# Patient Record
Sex: MALE | Employment: OTHER | URBAN - METROPOLITAN AREA
[De-identification: names, ages, dates, MRNs, and addresses within clinical notes are randomized per-mention and may not be internally consistent; named-entity substitution may affect disease eponyms.]

---

## 2019-01-02 PROBLEM — R06.09 DYSPNEA ON EXERTION: Status: ACTIVE | Noted: 2019-01-02

## 2019-01-02 PROBLEM — R55 SYNCOPE: Status: ACTIVE | Noted: 2019-01-02

## 2019-01-02 PROBLEM — I25.10 CORONARY ARTERY DISEASE: Status: ACTIVE | Noted: 2019-01-02

## 2020-05-29 ENCOUNTER — VIRTUAL VISIT (OUTPATIENT)
Dept: FAMILY MEDICINE CLINIC | Age: 70
End: 2020-05-29

## 2020-05-29 VITALS — HEIGHT: 72 IN | WEIGHT: 260 LBS | BODY MASS INDEX: 35.21 KG/M2

## 2020-05-29 DIAGNOSIS — R55 SYNCOPE, UNSPECIFIED SYNCOPE TYPE: Primary | ICD-10-CM

## 2020-05-29 DIAGNOSIS — E55.9 VITAMIN D DEFICIENCY: ICD-10-CM

## 2020-05-29 DIAGNOSIS — Z79.4 TYPE 2 DIABETES MELLITUS TREATED WITH INSULIN (HCC): ICD-10-CM

## 2020-05-29 DIAGNOSIS — I25.10 CORONARY ARTERY DISEASE WITHOUT ANGINA PECTORIS, UNSPECIFIED VESSEL OR LESION TYPE, UNSPECIFIED WHETHER NATIVE OR TRANSPLANTED HEART: ICD-10-CM

## 2020-05-29 DIAGNOSIS — K21.9 GASTROESOPHAGEAL REFLUX DISEASE WITHOUT ESOPHAGITIS: ICD-10-CM

## 2020-05-29 DIAGNOSIS — E11.9 TYPE 2 DIABETES MELLITUS TREATED WITH INSULIN (HCC): ICD-10-CM

## 2020-05-29 DIAGNOSIS — I10 ESSENTIAL HYPERTENSION: ICD-10-CM

## 2020-05-29 DIAGNOSIS — E10.65 HYPERGLYCEMIA DUE TO TYPE 1 DIABETES MELLITUS (HCC): ICD-10-CM

## 2020-05-29 RX ORDER — FUROSEMIDE 20 MG/1
20 TABLET ORAL
Qty: 90 TAB | Refills: 1 | Status: CANCELLED | OUTPATIENT
Start: 2020-05-29

## 2020-05-29 RX ORDER — ERGOCALCIFEROL 1.25 MG/1
CAPSULE ORAL
COMMUNITY
Start: 2020-05-13 | End: 2022-02-15

## 2020-05-30 RX ORDER — ALBUTEROL SULFATE 90 UG/1
2 AEROSOL, METERED RESPIRATORY (INHALATION)
Qty: 1 INHALER | Refills: 5 | Status: SHIPPED | OUTPATIENT
Start: 2020-05-30 | End: 2020-11-16 | Stop reason: SDUPTHER

## 2020-05-30 RX ORDER — FUROSEMIDE 40 MG/1
40 TABLET ORAL DAILY
Qty: 90 TAB | Refills: 1 | Status: SHIPPED | OUTPATIENT
Start: 2020-05-30 | End: 2020-11-13

## 2020-05-30 RX ORDER — OMEPRAZOLE 40 MG/1
40 CAPSULE, DELAYED RELEASE ORAL DAILY
Qty: 90 CAP | Refills: 1 | Status: SHIPPED | OUTPATIENT
Start: 2020-05-30 | End: 2020-11-13

## 2020-05-30 RX ORDER — CARVEDILOL 25 MG/1
25 TABLET ORAL 2 TIMES DAILY WITH MEALS
Qty: 180 TAB | Refills: 1 | Status: SHIPPED | OUTPATIENT
Start: 2020-05-30 | End: 2020-11-13

## 2020-05-30 RX ORDER — HYDROCHLOROTHIAZIDE 25 MG/1
25 TABLET ORAL DAILY
Qty: 90 TAB | Refills: 1 | Status: SHIPPED | OUTPATIENT
Start: 2020-05-30 | End: 2020-11-13

## 2020-05-30 RX ORDER — HYDRALAZINE HYDROCHLORIDE AND ISOSORBIDE DINITRATE 37.5; 2 MG/1; MG/1
1 TABLET, FILM COATED ORAL 3 TIMES DAILY
Qty: 270 TAB | Refills: 1 | Status: SHIPPED | OUTPATIENT
Start: 2020-05-30 | End: 2020-08-25 | Stop reason: SDUPTHER

## 2020-05-30 RX ORDER — GUAIFENESIN 100 MG/5ML
81 LIQUID (ML) ORAL DAILY
Qty: 90 TAB | Refills: 1 | Status: SHIPPED | OUTPATIENT
Start: 2020-05-30 | End: 2020-11-13

## 2020-05-30 RX ORDER — INSULIN GLARGINE 100 [IU]/ML
26 INJECTION, SOLUTION SUBCUTANEOUS
Qty: 3 PEN | Refills: 1 | Status: SHIPPED | OUTPATIENT
Start: 2020-05-30 | End: 2020-06-02 | Stop reason: SDUPTHER

## 2020-05-30 RX ORDER — ATORVASTATIN CALCIUM 40 MG/1
40 TABLET, FILM COATED ORAL DAILY
Qty: 90 TAB | Refills: 1 | Status: SHIPPED | OUTPATIENT
Start: 2020-05-30 | End: 2020-11-13

## 2020-05-30 RX ORDER — INSULIN GLARGINE 100 [IU]/ML
30 INJECTION, SOLUTION SUBCUTANEOUS 2 TIMES DAILY
Qty: 6 PEN | Refills: 1 | Status: SHIPPED | OUTPATIENT
Start: 2020-05-30 | End: 2020-06-02 | Stop reason: SDUPTHER

## 2020-05-30 RX ORDER — AMITRIPTYLINE HYDROCHLORIDE 25 MG/1
25 TABLET, FILM COATED ORAL
Qty: 90 TAB | Refills: 1 | Status: SHIPPED | OUTPATIENT
Start: 2020-05-30 | End: 2020-11-13

## 2020-05-30 RX ORDER — TAMSULOSIN HYDROCHLORIDE 0.4 MG/1
0.4 CAPSULE ORAL DAILY
Qty: 90 CAP | Refills: 1 | Status: SHIPPED | OUTPATIENT
Start: 2020-05-30 | End: 2020-11-13

## 2020-05-30 RX ORDER — METFORMIN HYDROCHLORIDE 1000 MG/1
1000 TABLET ORAL 2 TIMES DAILY WITH MEALS
Qty: 180 TAB | Refills: 1 | Status: SHIPPED | OUTPATIENT
Start: 2020-05-30 | End: 2020-11-13

## 2020-05-31 NOTE — PROGRESS NOTES
Clark Rondon is a 77 y.o. male who was seen by synchronous (real-time) audio-video technology on 5/29/2020. Consent: Clark Rondon, who was seen by synchronous (real-time) audio-video technology, and/or his healthcare decision maker, is aware that this patient-initiated, Telehealth encounter on 5/29/2020 is a billable service, with coverage as determined by his insurance carrier. He is aware that he may receive a bill and has provided verbal consent to proceed: Yes. [facility]   he was released march   he had a syncopal episode last week   he was taken to [facility] and they said his sugar was normal   he was told they did not find anything  He denies any chest pain or chest  He describes an \"uncomfortable feeling in my head\" right before he passes out   this has happened many other times even as a child    He is also had CAD and post CABG X4  He has no cardiologist at this time    DM   taking lantus insulin twice a day   he seems to understand that when his blood sugar is low a few times he needs to lower the insulin. CHF  He has h/o chf. Has no doc to follow up with   No chest pain and no sob    Asthma  No recent wheezing  But  needs albuterol for emergency use  GERD   needs refill on meds                                  Assessment & Plan:   Diagnoses and all orders for this visit:    1. Syncope, unspecified syncope type  -     REFERRAL TO NEUROLOGY    2. Coronary artery disease without angina pectoris, unspecified vessel or lesion type, unspecified whether native or transplanted heart  -     atorvastatin (LIPITOR) 40 mg tablet; Take 1 Tab by mouth daily. -     aspirin 81 mg chewable tablet; Take 1 Tab by mouth daily. -     carvediloL (COREG) 25 mg tablet; Take 1 Tab by mouth two (2) times daily (with meals). 3. Hyperglycemia due to type 1 diabetes mellitus ([address]    4.  Type 2 diabetes mellitus treated with insulin (HCC)  -     insulin glargine (Lantus Solostar U-100 Insulin) 100 unit/mL (3 mL) inpn; 30 Units by SubCUTAneous route two (2) times a day. -     insulin glargine (Lantus Solostar U-100 Insulin) 100 unit/mL (3 mL) inpn; 26 Units by SubCUTAneous route every morning. 5. Vitamin D deficiency  -     VITAMIN D, 25 HYDROXY; Future    Other orders  -     amitriptyline (ELAVIL) 25 mg tablet; Take 1 Tab by mouth nightly. -     isosorbide-hydrALAZINE (BiDiL) 20-37.5 mg per tablet; Take 1 Tab by mouth three (3) times daily. -     furosemide (LASIX) 40 mg tablet; Take 1 Tab by mouth daily. -     hydroCHLOROthiazide (HYDRODIURIL) 25 mg tablet; Take 1 Tab by mouth daily. -     tamsulosin (FLOMAX) 0.4 mg capsule; Take 1 Cap by mouth daily. -     furosemide (LASIX) 20 mg tablet; Take 1 Tab by mouth nightly. -     metFORMIN (GLUCOPHAGE) 1,000 mg tablet; Take 1 Tab by mouth two (2) times daily (with meals). -     omeprazole (PRILOSEC) 40 mg capsule; Take 1 Cap by mouth daily. -     albuterol (PROVENTIL HFA, VENTOLIN HFA, PROAIR HFA) 90 mcg/actuation inhaler; Take 2 Puffs by inhalation every four (4) hours as needed for Wheezing for up to 360 days.  -     METABOLIC PANEL, COMPREHENSIVE; Future  -     LIPID PANEL; Future  -     REFERRAL TO CARDIOLOGY        . Subjective:   Horacio Santos is a 77 y.o. male who was seen for New Patient (Patient states last week seen at [facility] ER for passing out. ); Establish Care (Patient states incarcerated 25 years. ); and Joint Pain      Prior to Admission medications    Medication Sig Start Date End Date Taking? Authorizing Provider   ergocalciferol (ERGOCALCIFEROL) 1,250 mcg (50,000 unit) capsule TK 1 C PO 1 TIME A WK 5/13/20  Yes Provider, Historical   amitriptyline (ELAVIL) 25 mg tablet Take 25 mg by mouth nightly. Yes Other, MD Abdoulaye   aspirin 81 mg chewable tablet Take 81 mg by mouth daily. Yes Other, MD Abdoulaye   atorvastatin (LIPITOR) 40 mg tablet Take 40 mg by mouth daily.    Yes Other, MD Abdoulaye   isosorbide-hydrALAZINE (BIDIL) 20-37.5 mg per tablet Take 1 Tab by mouth three (3) times daily. Yes Abdoulaye Baldwin MD   carvedilol (COREG) 25 mg tablet Take 25 mg by mouth two (2) times daily (with meals). Yes Abdoulaye Baldwin MD   furosemide (LASIX) 40 mg tablet Take 40 mg by mouth daily. Yes Abdoulaye Baldwin MD   hydroCHLOROthiazide (HYDRODIURIL) 25 mg tablet Take 25 mg by mouth daily. Yes Abdoulaye Baldwin MD   insulin NPH/insulin regular (HUMULIN 70/30 U-100 INSULIN) 100 unit/mL (70-30) injection 4 Units by SubCUTAneous route Before breakfast, lunch, and dinner. Yes Abdoulaye Baldwin MD   insulin glargine (LANTUS SOLOSTAR U-100 INSULIN) 100 unit/mL (3 mL) inpn 20 Units by SubCUTAneous route nightly. Yes Abdoulaye Baldwin MD   insulin glargine (LANTUS SOLOSTAR U-100 INSULIN) 100 unit/mL (3 mL) inpn 26 Units by SubCUTAneous route every morning. Yes Abdoulyae Baldwin MD   tamsulosin (FLOMAX) 0.4 mg capsule Take 0.4 mg by mouth daily. Yes Abdoulaye Baldwin MD   levalbuterol tartrate (XOPENEX HFA) 45 mcg/actuation inhaler Take 2 Puffs by inhalation every six (6) hours as needed for Wheezing. Yes Abdoulaye Baldwin MD   furosemide (LASIX) 20 mg tablet Take 20 mg by mouth nightly. Abdoulaye Baldwin MD   metFORMIN (GLUCOPHAGE) 1,000 mg tablet Take 1,000 mg by mouth two (2) times daily (with meals). Abdoulaye Baldwin MD   omeprazole (PRILOSEC) 40 mg capsule Take 40 mg by mouth daily. Abdoulaye Baldwin MD     No Known Allergies    Patient Active Problem List    Diagnosis Date Noted    Coronary artery disease 01/02/2019    Syncope 01/02/2019    Dyspnea on exertion 01/02/2019     Current Outpatient Medications   Medication Sig Dispense Refill    ergocalciferol (ERGOCALCIFEROL) 1,250 mcg (50,000 unit) capsule TK 1 C PO 1 TIME A WK      amitriptyline (ELAVIL) 25 mg tablet Take 25 mg by mouth nightly.  aspirin 81 mg chewable tablet Take 81 mg by mouth daily.  atorvastatin (LIPITOR) 40 mg tablet Take 40 mg by mouth daily.       isosorbide-hydrALAZINE (BIDIL) 20-37.5 mg per tablet Take 1 Tab by mouth three (3) times daily.  carvedilol (COREG) 25 mg tablet Take 25 mg by mouth two (2) times daily (with meals).  furosemide (LASIX) 40 mg tablet Take 40 mg by mouth daily.  hydroCHLOROthiazide (HYDRODIURIL) 25 mg tablet Take 25 mg by mouth daily.  insulin NPH/insulin regular (HUMULIN 70/30 U-100 INSULIN) 100 unit/mL (70-30) injection 4 Units by SubCUTAneous route Before breakfast, lunch, and dinner.  insulin glargine (LANTUS SOLOSTAR U-100 INSULIN) 100 unit/mL (3 mL) inpn 20 Units by SubCUTAneous route nightly.  insulin glargine (LANTUS SOLOSTAR U-100 INSULIN) 100 unit/mL (3 mL) inpn 26 Units by SubCUTAneous route every morning.  tamsulosin (FLOMAX) 0.4 mg capsule Take 0.4 mg by mouth daily.  levalbuterol tartrate (XOPENEX HFA) 45 mcg/actuation inhaler Take 2 Puffs by inhalation every six (6) hours as needed for Wheezing.  furosemide (LASIX) 20 mg tablet Take 20 mg by mouth nightly.  metFORMIN (GLUCOPHAGE) 1,000 mg tablet Take 1,000 mg by mouth two (2) times daily (with meals).  omeprazole (PRILOSEC) 40 mg capsule Take 40 mg by mouth daily.          ROS    Objective:   Vital Signs: (As obtained by patient/caregiver at home)  Visit Vitals  Ht 6' (1.829 m)   Wt 260 lb (117.9 kg)   BMI 35.26 kg/m²        [INSTRUCTIONS:  \"[x]\" Indicates a positive item  \"[]\" Indicates a negative item  -- DELETE ALL ITEMS NOT EXAMINED]    Constitutional: [x] Appears well-developed and well-nourished [x] No apparent distress      [] Abnormal -     Mental status: [x] Alert and awake  [x] Oriented to person/place/time [x] Able to follow commands    [] Abnormal -     Eyes:   EOM    [x]  Normal    [] Abnormal -   Sclera  [x]  Normal    [] Abnormal -          Discharge [x]  None visible   [] Abnormal -     HENT: [x] Normocephalic, atraumatic  [] Abnormal -   [x] Mouth/Throat: Mucous membranes are moist    External Ears [x] Normal  [] Abnormal -    Neck: [x] No visualized mass [] Abnormal -     Pulmonary/Chest: [x] Respiratory effort normal   [x] No visualized signs of difficulty breathing or respiratory distress        [] Abnormal -      Musculoskeletal:   [x] Normal gait with no signs of ataxia         [x] Normal range of motion of neck        [] Abnormal -     Neurological:        [x] No Facial Asymmetry (Cranial nerve 7 motor function) (limited exam due to video visit)          [x] No gaze palsy        [] Abnormal -          Skin:        [x] No significant exanthematous lesions or discoloration noted on facial skin         [] Abnormal -            Psychiatric:       [x] Normal Affect [] Abnormal -        [x] No Hallucinations    Other pertinent observable physical exam findings:-        We discussed the expected course, resolution and complications of the diagnosis(es) in detail. Medication risks, benefits, costs, interactions, and alternatives were discussed as indicated. I advised him to contact the office if his condition worsens, changes or fails to improve as anticipated. He expressed understanding with the diagnosis(es) and plan. Lilliana Lund is a 77 y.o. male who was evaluated by a video visit encounter for concerns as above. Patient identification was verified prior to start of the visit. A caregiver was present when appropriate. Due to this being a TeleHealth encounter (During CCOJP-00 public health emergency), evaluation of the following organ systems was limited: Vitals/Constitutional/EENT/Resp/CV/GI//MS/Neuro/Skin/Heme-Lymph-Imm. Pursuant to the emergency declaration under the [facility] waiver authority and the [employer] Act, this Virtual  Visit was conducted, with patient's (and/or legal guardian's) consent, to reduce the patient's risk of exposure to COVID-19 and provide necessary medical care.      Services were provided through a video synchronous discussion virtually to substitute for in-person clinic visit. Patient and provider were located at their individual homes.       Wan Ac MD

## 2020-06-02 DIAGNOSIS — E11.9 TYPE 2 DIABETES MELLITUS TREATED WITH INSULIN (HCC): ICD-10-CM

## 2020-06-02 DIAGNOSIS — Z79.4 TYPE 2 DIABETES MELLITUS TREATED WITH INSULIN (HCC): ICD-10-CM

## 2020-06-02 RX ORDER — INSULIN GLARGINE 100 [IU]/ML
30 INJECTION, SOLUTION SUBCUTANEOUS 2 TIMES DAILY
Qty: 6 PEN | Refills: 1 | Status: SHIPPED | OUTPATIENT
Start: 2020-06-02 | End: 2020-11-13

## 2020-06-03 ENCOUNTER — DOCUMENTATION ONLY (OUTPATIENT)
Dept: FAMILY MEDICINE CLINIC | Age: 70
End: 2020-06-03

## 2020-06-08 ENCOUNTER — TELEPHONE (OUTPATIENT)
Dept: FAMILY MEDICINE CLINIC | Age: 70
End: 2020-06-08

## 2020-09-04 ENCOUNTER — HOSPITAL ENCOUNTER (EMERGENCY)
Age: 70
Discharge: HOME | End: 2020-09-04
Attending: EMERGENCY MEDICINE | Admitting: EMERGENCY MEDICINE
Payer: COMMERCIAL

## 2020-09-04 VITALS
TEMPERATURE: 98.4 F | BODY MASS INDEX: 34.92 KG/M2 | HEIGHT: 73 IN | HEART RATE: 77 BPM | WEIGHT: 263.5 LBS | SYSTOLIC BLOOD PRESSURE: 161 MMHG | RESPIRATION RATE: 20 BRPM | DIASTOLIC BLOOD PRESSURE: 94 MMHG | OXYGEN SATURATION: 95 %

## 2020-09-04 DIAGNOSIS — F43.10 PTSD (POST-TRAUMATIC STRESS DISORDER): ICD-10-CM

## 2020-09-04 DIAGNOSIS — F51.05 INSOMNIA DUE TO ANXIETY AND FEAR: Primary | ICD-10-CM

## 2020-09-04 DIAGNOSIS — F22 PARANOIA (HCC): ICD-10-CM

## 2020-09-04 DIAGNOSIS — F40.9 INSOMNIA DUE TO ANXIETY AND FEAR: Primary | ICD-10-CM

## 2020-09-04 LAB
AMPHET UR QL SCN: NEGATIVE
BARBITURATES UR QL SCN: NEGATIVE
BENZODIAZ UR QL: NEGATIVE
CANNABINOIDS UR QL SCN: NEGATIVE
COCAINE UR QL SCN: NEGATIVE
DRUG SCRN COMMENT,DRGCM: NORMAL
GLUCOSE BLD STRIP.AUTO-MCNC: 221 MG/DL (ref 65–100)
METHADONE UR QL: NEGATIVE
OPIATES UR QL: NEGATIVE
PCP UR QL: NEGATIVE
SERVICE CMNT-IMP: ABNORMAL

## 2020-09-04 PROCEDURE — 80307 DRUG TEST PRSMV CHEM ANLYZR: CPT

## 2020-09-04 PROCEDURE — 82962 GLUCOSE BLOOD TEST: CPT

## 2020-09-04 PROCEDURE — 99283 EMERGENCY DEPT VISIT LOW MDM: CPT
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
## 2020-09-04 NOTE — DISCHARGE INSTRUCTIONS
Follow up at [facility] for further evaluation and treatment of PTSD, paranoia, and insomnia.
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
## 2020-09-04 NOTE — ED NOTES
Discharge instructions were given to the patient by Ángel Painter RN. The patient left the Emergency Department ambulatory, alert and oriented and in no acute distress with no prescriptions. The patient was encouraged to call or return to the ED for worsening issues or problems and was encouraged to schedule a follow up appointment for continuing care. The patient verbalized understanding of discharge instructions and prescriptions, all questions were answered. The patient has no further concerns at this time.

## 2020-09-04 NOTE — ED NOTES
Pt reports having these thoughts for years but never did anything about it because he didn't trust the people in long-term.

## 2020-09-14 ENCOUNTER — DOCUMENTATION ONLY (OUTPATIENT)
Dept: FAMILY MEDICINE CLINIC | Age: 70
End: 2020-09-14

## 2020-09-15 ENCOUNTER — DOCUMENTATION ONLY (OUTPATIENT)
Dept: FAMILY MEDICINE CLINIC | Age: 70
End: 2020-09-15

## 2020-09-15 NOTE — PROGRESS NOTES
Stephanie Duke Key: [id number] - PA Case ID: [id number]  Awaiting  Bidil 20-37.5 mg Tablet  Qty 270

## 2020-09-16 ENCOUNTER — HOSPITAL ENCOUNTER (EMERGENCY)
Age: 70
Discharge: HOME | End: 2020-09-16
Attending: STUDENT IN AN ORGANIZED HEALTH CARE EDUCATION/TRAINING PROGRAM | Admitting: STUDENT IN AN ORGANIZED HEALTH CARE EDUCATION/TRAINING PROGRAM
Payer: COMMERCIAL

## 2020-09-16 ENCOUNTER — APPOINTMENT (OUTPATIENT)
Dept: GENERAL RADIOLOGY | Age: 70
End: 2020-09-16
Attending: STUDENT IN AN ORGANIZED HEALTH CARE EDUCATION/TRAINING PROGRAM
Payer: COMMERCIAL

## 2020-09-16 VITALS
HEART RATE: 80 BPM | RESPIRATION RATE: 16 BRPM | SYSTOLIC BLOOD PRESSURE: 156 MMHG | OXYGEN SATURATION: 97 % | DIASTOLIC BLOOD PRESSURE: 88 MMHG | TEMPERATURE: 98.3 F

## 2020-09-16 DIAGNOSIS — S93.402A SPRAIN OF LEFT ANKLE, UNSPECIFIED LIGAMENT, INITIAL ENCOUNTER: Primary | ICD-10-CM

## 2020-09-16 PROCEDURE — 73610 X-RAY EXAM OF ANKLE: CPT

## 2020-09-16 PROCEDURE — 74011250637 HC RX REV CODE- 250/637: Performed by: NURSE PRACTITIONER

## 2020-09-16 PROCEDURE — 99284 EMERGENCY DEPT VISIT MOD MDM: CPT

## 2020-09-16 RX ORDER — HYDROCODONE BITARTRATE AND ACETAMINOPHEN 5; 325 MG/1; MG/1
1 TABLET ORAL
Qty: 8 TAB | Refills: 0 | Status: SHIPPED | OUTPATIENT
Start: 2020-09-16 | End: 2020-09-19

## 2020-09-16 RX ORDER — OXYCODONE AND ACETAMINOPHEN 5; 325 MG/1; MG/1
1 TABLET ORAL
Status: COMPLETED | OUTPATIENT
Start: 2020-09-16 | End: 2020-09-16

## 2020-09-16 RX ORDER — OXYCODONE AND ACETAMINOPHEN 5; 325 MG/1; MG/1
TABLET ORAL
Status: DISCONTINUED
Start: 2020-09-16 | End: 2020-09-16 | Stop reason: HOSPADM

## 2020-09-16 RX ADMIN — OXYCODONE HYDROCHLORIDE AND ACETAMINOPHEN 1 TABLET: 5; 325 TABLET ORAL at 03:17

## 2020-09-16 NOTE — ED PROVIDER NOTES
76 y/o male with PMHx CAD, DM, HTN, and CHF presents via EMS from home with c/o pain to the Left ankle. Patient states that he was walking down the stairs and missed the first step and stumbled down approximately 5 steps. Patient with no other complaints, states that he tucked and rolled, denies LOC, denies visual change,denies syncope, CP, SOB, feeling dizzy or light headed. Denies neck pain, numbness or tingling. Patient state that he was able to get himself up and put his feet into hot water, without relief. Patient endorses HX neuropathy to BLE. Past Medical History:   Diagnosis Date    CAD (coronary artery disease)     Diabetes ([facility]     GERD (gastroesophageal reflux disease)     Heart failure (HCC)     Hypertension        Past Surgical History:   Procedure Laterality Date    HX CORONARY ARTERY BYPASS GRAFT      4 way         History reviewed. No pertinent family history.     Social History     Socioeconomic History    Marital status:      Spouse name: Not on file    Number of children: Not on file    Years of education: Not on file    Highest education level: Not on file   Occupational History    Not on file   Social Needs    Financial resource strain: Not on file    Food insecurity     Worry: Not on file     Inability: Not on file    Transportation needs     Medical: Not on file     Non-medical: Not on file   Tobacco Use    Smoking status: Current Every Day Smoker     Packs/day: 0.50     Types: Cigarettes    Smokeless tobacco: Never Used   Substance and Sexual Activity    Alcohol use: No     Frequency: Never    Drug use: Not Currently    Sexual activity: Not Currently   Lifestyle    Physical activity     Days per week: Not on file     Minutes per session: Not on file    Stress: Not on file   Relationships    Social connections     Talks on phone: Not on file     Gets together: Not on file     Attends Congregational service: Not on file     Active member of club or organization: Not on file     Attends meetings of clubs or organizations: Not on file     Relationship status: Not on file    Intimate partner violence     Fear of current or ex partner: Not on file     Emotionally abused: Not on file     Physically abused: Not on file     Forced sexual activity: Not on file   Other Topics Concern    Not on file   Social History Narrative    Not on file         ALLERGIES: Patient has no known allergies. Review of Systems   Eyes: Negative for visual disturbance. Respiratory: Negative for shortness of breath. Cardiovascular: Negative for chest pain. Musculoskeletal: Positive for arthralgias, gait problem, joint swelling and myalgias. Negative for neck pain. Pain to the left ankle. Skin: Negative for color change and wound. Neurological: Negative for dizziness, syncope, light-headedness and headaches. Vitals:    20 0104   BP: (!) 153/103   Pulse: 74   Resp: 16   Temp: 98.8 °F (37.1 °C)   SpO2: 98%            Physical Exam  Vitals signs and nursing note reviewed. Constitutional:       General: He is not in acute distress. Appearance: Normal appearance. He is obese. He is not ill-appearing. HENT:      Head: Normocephalic. Nose: Nose normal.   Neck:      Musculoskeletal: Normal range of motion. Cardiovascular:      Rate and Rhythm: Normal rate. Pulmonary:      Effort: Pulmonary effort is normal. No respiratory distress. Abdominal:      General: There is no distension. Tenderness: There is no abdominal tenderness. Musculoskeletal:         General: Tenderness and signs of injury present. No swelling or deformity. Left ankle: He exhibits decreased range of motion and swelling. He exhibits no ecchymosis, no deformity, no laceration and normal pulse. Tenderness. Lateral malleolus, medial malleolus and AITFL tenderness found. Right lower le+ Edema present. Left lower le+ Edema present.         Feet:    Skin: General: Skin is dry. Neurological:      Mental Status: He is alert and oriented to person, place, and time. Psychiatric:         Mood and Affect: Mood normal.          MDM  Number of Diagnoses or Management Options  Diagnosis management comments: Possible: left ankle effusion vs. Acute dislocation vs. Osteoarthritis vs. Fracture vs septic joint  Plan: xray left ankle, analgesia         Amount and/or Complexity of Data Reviewed  Tests in the radiology section of CPT®: ordered and reviewed  Discuss the patient with other providers: yes      VITAL SIGNS:  Patient Vitals for the past 4 hrs:   Temp Pulse Resp BP SpO2   09/16/20 0104 98.8 °F (37.1 °C) 74 16 (!) 153/103 98 %         LABS:  No results found for this or any previous visit (from the past 6 hour(s)). IMAGING:  XR ANKLE LT MIN 3 V   Final Result   IMPRESSION: Soft tissue swelling. No evidence of fracture. Medications During Visit:  Medications   oxyCODONE-acetaminophen (PERCOCET) 5-325 mg per tablet 1 Tab (has no administration in time range)         DECISION MAKING:  Jen Vásquez is a 77 y.o. male who comes in as above. Known diabetic with neuropathy, presents via EMS after missing a step at home and tumbling down 5 stairs. Patient's only complaint is pain to the left ankle. Specifically denies feeling dizzy or light headed prior to fall, states that he missed the step and stumbled after his ankle gave out on him. Denies syncope or hitting his head. 0215 Re-evaluation: X-ray remarkable for ankle DJD, osteopenia, and soft tissue swelling. Discussed results and plan with patient to discharge home,a one time RX for hydrocodone, walking boot to support left ankle and walker. Patient verbalizes understanding and agrees with plan. Patient hemodynamically stable. IMPRESSION:  1.  Sprain of left ankle, unspecified ligament, initial encounter        DISPOSITION:  Discharged home      Current Discharge Medication List           Follow-up Information     Follow up With Specialties Details [address]   [address] [facility] [address]    [facility] [address] [facility]   [id number]  [phone]            The patient is asked to follow-up with their primary care provider in the next several days. They are to call tomorrow for an appointment. The patient is asked to return promptly for any increased concerns or worsening of symptoms. They can return to this emergency department or any other emergency department. Procedures    Discussed patient care with Jewel Parker MD. Attending was given the opportunity to see and evaluate the patient. Agrees with care plan as discussed.   Destini Fernandes NP  2:33 AM

## 2020-09-16 NOTE — DISCHARGE INSTRUCTIONS
Rest, ice and elevate for 20 min of every hour. You may bear weight on your left ankle as tolerated, follow up with Orthopedics if no improvement after 2 weeks. You may take Ibuprofen 600 mg every  4-5 hours for pain control.

## 2020-09-16 NOTE — ED TRIAGE NOTES
Patient arrives via EMS from home. Pt reports he tripped and fell down about 5 stairs and hit his left ankle. Patient denies hitting head, back and neck. Pt states he was unable to bear weight on the ankle after the fall.

## 2020-09-16 NOTE — ED NOTES
Patient ambulatory with a steady gait independently with walker. Patient called for a ride home. I have reviewed discharge instructions with the patient. The patient verbalized understanding. VSS and in no acute distress.

## 2020-11-06 ENCOUNTER — DOCUMENTATION ONLY (OUTPATIENT)
Dept: FAMILY MEDICINE CLINIC | Age: 70
End: 2020-11-06

## 2020-11-13 ENCOUNTER — VIRTUAL VISIT (OUTPATIENT)
Dept: FAMILY MEDICINE CLINIC | Age: 70
End: 2020-11-13
Payer: COMMERCIAL

## 2020-11-13 DIAGNOSIS — E11.9 TYPE 2 DIABETES MELLITUS TREATED WITH INSULIN (HCC): Primary | ICD-10-CM

## 2020-11-13 DIAGNOSIS — I10 ESSENTIAL HYPERTENSION: ICD-10-CM

## 2020-11-13 DIAGNOSIS — I25.119 CORONARY ARTERY DISEASE WITH ANGINA PECTORIS, UNSPECIFIED VESSEL OR LESION TYPE, UNSPECIFIED WHETHER NATIVE OR TRANSPLANTED HEART (HCC): ICD-10-CM

## 2020-11-13 DIAGNOSIS — Z79.4 TYPE 2 DIABETES MELLITUS TREATED WITH INSULIN (HCC): Primary | ICD-10-CM

## 2020-11-13 DIAGNOSIS — K21.9 GASTROESOPHAGEAL REFLUX DISEASE WITHOUT ESOPHAGITIS: ICD-10-CM

## 2020-11-13 DIAGNOSIS — I25.10 CORONARY ARTERY DISEASE WITHOUT ANGINA PECTORIS, UNSPECIFIED VESSEL OR LESION TYPE, UNSPECIFIED WHETHER NATIVE OR TRANSPLANTED HEART: ICD-10-CM

## 2020-11-13 DIAGNOSIS — Z12.5 SCREENING FOR PROSTATE CANCER: ICD-10-CM

## 2020-11-13 PROCEDURE — 3052F HG A1C>EQUAL 8.0%<EQUAL 9.0%: CPT | Performed by: FAMILY MEDICINE

## 2020-11-13 PROCEDURE — 99213 OFFICE O/P EST LOW 20 MIN: CPT | Performed by: FAMILY MEDICINE

## 2020-11-13 RX ORDER — OMEPRAZOLE 40 MG/1
40 CAPSULE, DELAYED RELEASE ORAL DAILY
Qty: 30 CAP | Refills: 0 | Status: SHIPPED | OUTPATIENT
Start: 2020-11-13 | End: 2021-08-26 | Stop reason: SDUPTHER

## 2020-11-13 RX ORDER — METFORMIN HYDROCHLORIDE 1000 MG/1
1000 TABLET ORAL 2 TIMES DAILY WITH MEALS
Qty: 60 TAB | Refills: 0 | Status: SHIPPED | OUTPATIENT
Start: 2020-11-13

## 2020-11-13 RX ORDER — HYDRALAZINE HYDROCHLORIDE AND ISOSORBIDE DINITRATE 37.5; 2 MG/1; MG/1
1 TABLET, FILM COATED ORAL 3 TIMES DAILY
Qty: 90 TAB | Refills: 0 | Status: SHIPPED | OUTPATIENT
Start: 2020-11-13 | End: 2022-02-15

## 2020-11-13 RX ORDER — SYRINGE-NEEDLE,INSULIN,0.5 ML 30 GX5/16"
SYRINGE, EMPTY DISPOSABLE MISCELLANEOUS
Qty: 200 SYRINGE | Refills: 3 | Status: SHIPPED | OUTPATIENT
Start: 2020-11-13 | End: 2022-02-15

## 2020-11-13 RX ORDER — HYDROCHLOROTHIAZIDE 25 MG/1
25 TABLET ORAL DAILY
Qty: 30 TAB | Refills: 0 | Status: SHIPPED | OUTPATIENT
Start: 2020-11-13 | End: 2022-02-15

## 2020-11-13 RX ORDER — INSULIN GLARGINE 100 [IU]/ML
30 INJECTION, SOLUTION SUBCUTANEOUS 2 TIMES DAILY
Qty: 6 PEN | Refills: 1 | Status: SHIPPED | OUTPATIENT
Start: 2020-11-13 | End: 2021-09-08 | Stop reason: SDUPTHER

## 2020-11-13 RX ORDER — CARVEDILOL 25 MG/1
25 TABLET ORAL 2 TIMES DAILY WITH MEALS
Qty: 60 TAB | Refills: 0 | Status: SHIPPED | OUTPATIENT
Start: 2020-11-13 | End: 2021-11-09

## 2020-11-13 RX ORDER — AMITRIPTYLINE HYDROCHLORIDE 25 MG/1
25 TABLET, FILM COATED ORAL
Qty: 30 TAB | Refills: 0 | Status: SHIPPED | OUTPATIENT
Start: 2020-11-13 | End: 2021-06-28 | Stop reason: ALTCHOICE

## 2020-11-13 RX ORDER — ATORVASTATIN CALCIUM 40 MG/1
40 TABLET, FILM COATED ORAL DAILY
Qty: 30 TAB | Refills: 0 | Status: SHIPPED | OUTPATIENT
Start: 2020-11-13 | End: 2022-02-15

## 2020-11-13 RX ORDER — GUAIFENESIN 100 MG/5ML
81 LIQUID (ML) ORAL DAILY
Qty: 90 TAB | Refills: 1 | Status: ON HOLD | OUTPATIENT
Start: 2020-11-13 | End: 2021-11-03 | Stop reason: SDUPTHER

## 2020-11-13 RX ORDER — TAMSULOSIN HYDROCHLORIDE 0.4 MG/1
0.4 CAPSULE ORAL DAILY
Qty: 30 CAP | Refills: 0 | Status: SHIPPED | OUTPATIENT
Start: 2020-11-13 | End: 2022-02-15

## 2020-11-13 RX ORDER — FUROSEMIDE 40 MG/1
40 TABLET ORAL DAILY
Qty: 30 TAB | Refills: 0 | Status: SHIPPED | OUTPATIENT
Start: 2020-11-13

## 2020-11-13 NOTE — PROGRESS NOTES
Coleman Fox is a 72 y.o. male      Chief Complaint   Patient presents with    Hypertension    Depression         1. Have you been to the ER, urgent care clinic since your last visit? Hospitalized since your last visit? no      2. Have you seen or consulted any other health care providers outside of the [address] [place] [address] since your last visit? Include any pap smears or colon screening.  no

## 2020-11-14 DIAGNOSIS — Z79.4 TYPE 2 DIABETES MELLITUS TREATED WITH INSULIN (HCC): Primary | ICD-10-CM

## 2020-11-14 DIAGNOSIS — E11.9 TYPE 2 DIABETES MELLITUS TREATED WITH INSULIN (HCC): Primary | ICD-10-CM

## 2020-11-14 RX ORDER — PEN NEEDLE, DIABETIC 31 GX3/16"
NEEDLE, DISPOSABLE MISCELLANEOUS
Qty: 100 PEN NEEDLE | Refills: 2 | Status: SHIPPED | OUTPATIENT
Start: 2020-11-14 | End: 2022-02-15

## 2020-11-16 ENCOUNTER — OFFICE VISIT (OUTPATIENT)
Dept: INTERNAL MEDICINE CLINIC | Age: 70
End: 2020-11-16
Payer: COMMERCIAL

## 2020-11-16 VITALS
TEMPERATURE: 98.3 F | BODY MASS INDEX: 33.6 KG/M2 | DIASTOLIC BLOOD PRESSURE: 91 MMHG | HEIGHT: 73 IN | OXYGEN SATURATION: 100 % | RESPIRATION RATE: 16 BRPM | SYSTOLIC BLOOD PRESSURE: 156 MMHG | WEIGHT: 253.5 LBS | HEART RATE: 81 BPM

## 2020-11-16 DIAGNOSIS — E11.9 COMPREHENSIVE DIABETIC FOOT EXAMINATION, TYPE 2 DM, ENCOUNTER FOR (HCC): ICD-10-CM

## 2020-11-16 DIAGNOSIS — Z23 NEEDS FLU SHOT: ICD-10-CM

## 2020-11-16 DIAGNOSIS — Z00.00 HEALTHCARE MAINTENANCE: ICD-10-CM

## 2020-11-16 DIAGNOSIS — I25.119 CORONARY ARTERY DISEASE WITH ANGINA PECTORIS, UNSPECIFIED VESSEL OR LESION TYPE, UNSPECIFIED WHETHER NATIVE OR TRANSPLANTED HEART (HCC): ICD-10-CM

## 2020-11-16 DIAGNOSIS — E11.9 ENCOUNTER FOR DIABETES TYPE 2 EYE EXAM (HCC): ICD-10-CM

## 2020-11-16 DIAGNOSIS — Z11.59 ENCOUNTER FOR HEPATITIS C SCREENING TEST FOR LOW RISK PATIENT: ICD-10-CM

## 2020-11-16 DIAGNOSIS — E11.51 DIABETIC PERIPHERAL VASCULAR DISORDER (HCC): ICD-10-CM

## 2020-11-16 DIAGNOSIS — Z76.89 ESTABLISHING CARE WITH NEW DOCTOR, ENCOUNTER FOR: Primary | ICD-10-CM

## 2020-11-16 DIAGNOSIS — Z01.00 ENCOUNTER FOR DIABETES TYPE 2 EYE EXAM (HCC): ICD-10-CM

## 2020-11-16 DIAGNOSIS — Z12.11 SCREEN FOR COLON CANCER: ICD-10-CM

## 2020-11-16 DIAGNOSIS — E11.8 TYPE II DIABETES MELLITUS WITH COMPLICATION (HCC): ICD-10-CM

## 2020-11-16 DIAGNOSIS — Z87.09 HISTORY OF ASTHMA: ICD-10-CM

## 2020-11-16 DIAGNOSIS — I10 ESSENTIAL HYPERTENSION: ICD-10-CM

## 2020-11-16 LAB
ALBUMIN SERPL-MCNC: 3.4 G/DL (ref 3.5–5)
ALBUMIN/GLOB SERPL: 0.9 {RATIO} (ref 1.1–2.2)
ALP SERPL-CCNC: 115 U/L (ref 45–117)
ALT SERPL-CCNC: 53 U/L (ref 12–78)
ANION GAP SERPL CALC-SCNC: 6 MMOL/L (ref 5–15)
AST SERPL-CCNC: 42 U/L (ref 15–37)
BILIRUB SERPL-MCNC: 0.5 MG/DL (ref 0.2–1)
BILIRUB UR QL STRIP: NEGATIVE
BUN SERPL-MCNC: 13 MG/DL (ref 6–20)
BUN/CREAT SERPL: 13 (ref 12–20)
CALCIUM SERPL-MCNC: 9.6 MG/DL (ref 8.5–10.1)
CHLORIDE SERPL-SCNC: 105 MMOL/L (ref 97–108)
CHOLEST SERPL-MCNC: 183 MG/DL
CO2 SERPL-SCNC: 27 MMOL/L (ref 21–32)
COMMENT, HOLDF: NORMAL
CREAT SERPL-MCNC: 0.97 MG/DL (ref 0.7–1.3)
ERYTHROCYTE [DISTWIDTH] IN BLOOD BY AUTOMATED COUNT: 13.2 % (ref 11.5–14.5)
GLOBULIN SER CALC-MCNC: 4 G/DL (ref 2–4)
GLUCOSE SERPL-MCNC: 295 MG/DL (ref 65–100)
GLUCOSE UR-MCNC: NORMAL MG/DL
HBA1C MFR BLD HPLC: 8.7 %
HCT VFR BLD AUTO: 50.8 % (ref 36.6–50.3)
HCV AB SER IA-ACNC: >11 INDEX
HCV AB SERPL QL IA: REACTIVE
HCV COMMENT,HCGAC: ABNORMAL
HDLC SERPL-MCNC: 68 MG/DL
HDLC SERPL: 2.7 {RATIO} (ref 0–5)
HGB BLD-MCNC: 16.8 G/DL (ref 12.1–17)
KETONES P FAST UR STRIP-MCNC: NORMAL MG/DL
LDLC SERPL CALC-MCNC: 86.2 MG/DL (ref 0–100)
LIPID PROFILE,FLP: NORMAL
MCH RBC QN AUTO: 31.1 PG (ref 26–34)
MCHC RBC AUTO-ENTMCNC: 33.1 G/DL (ref 30–36.5)
MCV RBC AUTO: 94.1 FL (ref 80–99)
NRBC # BLD: 0 K/UL (ref 0–0.01)
NRBC BLD-RTO: 0 PER 100 WBC
PH UR STRIP: 6 [PH] (ref 4.6–8)
PLATELET # BLD AUTO: 172 K/UL (ref 150–400)
PMV BLD AUTO: 12.3 FL (ref 8.9–12.9)
POTASSIUM SERPL-SCNC: 4.4 MMOL/L (ref 3.5–5.1)
PROT SERPL-MCNC: 7.4 G/DL (ref 6.4–8.2)
PROT UR QL STRIP: NEGATIVE
RBC # BLD AUTO: 5.4 M/UL (ref 4.1–5.7)
SAMPLES BEING HELD,HOLD: NORMAL
SODIUM SERPL-SCNC: 138 MMOL/L (ref 136–145)
SP GR UR STRIP: 1.02 (ref 1–1.03)
TRIGL SERPL-MCNC: 144 MG/DL (ref ?–150)
UA UROBILINOGEN AMB POC: NORMAL (ref 0.2–1)
URINALYSIS CLARITY POC: CLEAR
URINALYSIS COLOR POC: YELLOW
URINE BLOOD POC: NEGATIVE
URINE LEUKOCYTES POC: NEGATIVE
URINE NITRITES POC: NEGATIVE
VLDLC SERPL CALC-MCNC: 28.8 MG/DL
WBC # BLD AUTO: 8 K/UL (ref 4.1–11.1)

## 2020-11-16 PROCEDURE — 90694 VACC AIIV4 NO PRSRV 0.5ML IM: CPT | Performed by: NURSE PRACTITIONER

## 2020-11-16 PROCEDURE — 90471 IMMUNIZATION ADMIN: CPT | Performed by: NURSE PRACTITIONER

## 2020-11-16 PROCEDURE — 81003 URINALYSIS AUTO W/O SCOPE: CPT | Performed by: NURSE PRACTITIONER

## 2020-11-16 PROCEDURE — 99204 OFFICE O/P NEW MOD 45 MIN: CPT | Performed by: NURSE PRACTITIONER

## 2020-11-16 PROCEDURE — 83036 HEMOGLOBIN GLYCOSYLATED A1C: CPT | Performed by: NURSE PRACTITIONER

## 2020-11-16 RX ORDER — ALBUTEROL SULFATE 90 UG/1
2 AEROSOL, METERED RESPIRATORY (INHALATION)
Qty: 1 INHALER | Refills: 5 | Status: SHIPPED | OUTPATIENT
Start: 2020-11-16 | End: 2020-11-16

## 2020-11-16 RX ORDER — ALBUTEROL SULFATE 90 UG/1
2 AEROSOL, METERED RESPIRATORY (INHALATION)
Qty: 1 INHALER | Refills: 2 | Status: SHIPPED | OUTPATIENT
Start: 2020-11-16 | End: 2021-11-11

## 2020-11-16 NOTE — PATIENT INSTRUCTIONS
Learning About Meal Planning for Diabetes  Why plan your meals? Meal planning can be a key part of managing diabetes. Planning meals and snacks with the right balance of carbohydrate, protein, and fat can help you keep your blood sugar at the target level you set with your doctor. You don't have to eat special foods. You can eat what your family eats, including sweets once in a while. But you do have to pay attention to how often you eat and how much you eat of certain foods. You may want to work with a dietitian or a certified diabetes educator. He or she can give you tips and meal ideas and can answer your questions about meal planning. This health professional can also help you reach a healthy weight if that is one of your goals. What plan is right for you? Your dietitian or diabetes educator may suggest that you start with the plate format or carbohydrate counting. The plate format  The plate format is a simple way to help you manage how you eat. You plan meals by learning how much space each food should take on a plate. Using the plate format helps you spread carbohydrate throughout the day. It can make it easier to keep your blood sugar level within your target range. It also helps you see if you're eating healthy portion sizes. To use the plate format, you put non-starchy vegetables on half your plate. Add meat or meat substitutes on one-quarter of the plate. Put a grain or starchy vegetable (such as brown rice or a potato) on the final quarter of the plate. You can add a small piece of fruit and some low-fat or fat-free milk or yogurt, depending on your carbohydrate goal for each meal.  Here are some tips for using the plate format:  · Make sure that you are not using an oversized plate. A 9-inch plate is best. Many restaurants use larger plates. · Get used to using the plate format at home. Then you can use it when you eat out. · Write down your questions about using the plate format.  Talk to your doctor, a dietitian, or a diabetes educator about your concerns. Carbohydrate counting  With carbohydrate counting, you plan meals based on the amount of carbohydrate in each food. Carbohydrate raises blood sugar higher and more quickly than any other nutrient. It is found in desserts, breads and cereals, and fruit. It's also found in starchy vegetables such as potatoes and corn, grains such as rice and pasta, and milk and yogurt. Spreading carbohydrate throughout the day helps keep your blood sugar levels within your target range. Your daily amount depends on several things, including your weight, how active you are, which diabetes medicines you take, and what your goals are for your blood sugar levels. A registered dietitian or diabetes educator can help you plan how much carbohydrate to include in each meal and snack. A guideline for your daily amount of carbohydrate is:  · 45 to 60 grams at each meal. That's about the same as 3 to 4 carbohydrate servings. · 15 to 20 grams at each snack. That's about the same as 1 carbohydrate serving. The Nutrition Facts label on packaged foods tells you how much carbohydrate is in a serving of the food. First, look at the serving size on the food label. Is that the amount you eat in a serving? All of the nutrition information on a food label is based on that serving size. So if you eat more or less than that, you'll need to adjust the other numbers. Total carbohydrate is the next thing you need to look for on the label. If you count carbohydrate servings, one serving of carbohydrate is 15 grams. For foods that don't come with labels, such as fresh fruits and vegetables, you'll need a guide that lists carbohydrate in these foods. Ask your doctor, dietitian, or diabetes educator about books or other nutrition guides you can use.   If you take insulin, you need to know how many grams of carbohydrate are in a meal. This lets you know how much rapid-acting insulin to take before you eat. If you use an insulin pump, you get a constant rate of insulin during the day. So the pump must be programmed at meals to give you extra insulin to cover the rise in blood sugar after meals. When you know how much carbohydrate you will eat, you can take the right amount of insulin. Or, if you always use the same amount of insulin, you need to make sure that you eat the same amount of carbohydrate at meals. If you need more help to understand carbohydrate counting and food labels, ask your doctor, dietitian, or diabetes educator. How do you get started with meal planning? Here are some tips to get started:  · Plan your meals a week at a time. Don't forget to include snacks too. · Use cookbooks or online recipes to plan several main meals. Plan some quick meals for busy nights. You also can double some recipes that freeze well. Then you can save half for other busy nights when you don't have time to cook. · Make sure you have the ingredients you need for your recipes. If you're running low on basic items, put these items on your shopping list too. · List foods that you use to make breakfasts, lunches, and snacks. List plenty of fruits and vegetables. · Post this list on the refrigerator. Add to it as you think of more things you need. · Take the list to the store to do your weekly shopping. Follow-up care is a key part of your treatment and safety. Be sure to make and go to all appointments, and call your doctor if you are having problems. It's also a good idea to know your test results and keep a list of the medicines you take. Where can you learn more? Go to http://www.gray.com/  Enter A744 in the search box to learn more about \"Learning About Meal Planning for Diabetes. \"  Current as of: December 20, 2019               Content Version: 12.6  © 7662-7038 [employer].    Care instructions adapted under license by [employer] (which disclaims liability or warranty for this information). If you have questions about a medical condition or this instruction, always ask your healthcare professional. [address] any warranty or liability for your use of this information. Diabetes Foot Health: Care Instructions  Your Care Instructions     When you have diabetes, your feet need extra care and attention. Diabetes can damage the nerve endings and blood vessels in your feet, making you less likely to notice when your feet are injured. Diabetes also limits your body's ability to fight infection and get blood to areas that need it. If you get a minor foot injury, it could become an ulcer or a serious infection. With good foot care, you can prevent most of these problems. Caring for your feet can be quick and easy. Most of the care can be done when you are bathing or getting ready for bed. Follow-up care is a key part of your treatment and safety. Be sure to make and go to all appointments, and call your doctor if you are having problems. It's also a good idea to know your test results and keep a list of the medicines you take. How can you care for yourself at home? · Keep your blood sugar close to normal by watching what and how much you eat, monitoring blood sugar, taking medicines if prescribed, and getting regular exercise. · Do not smoke. Smoking affects blood flow and can make foot problems worse. If you need help quitting, talk to your doctor about stop-smoking programs and medicines. These can increase your chances of quitting for good. · Eat a diet that is low in fats. High fat intake can cause fat to build up in your blood vessels and decrease blood flow. · Inspect your feet daily for blisters, cuts, cracks, or sores. If you cannot see well, use a mirror or have someone help you. · Take care of your feet:  ? Wash your feet every day. Use warm (not hot) water.  Check the water temperature with your wrists or other part of your body, not your feet. ? Dry your feet well. Pat them dry. Do not rub the skin on your feet too hard. Dry well between your toes. If the skin on your feet stays moist, bacteria or a fungus can grow, which can lead to infection. ? Keep your skin soft. Use moisturizing skin cream to keep the skin on your feet soft and prevent calluses and cracks. But do not put the cream between your toes, and stop using any cream that causes a rash. ? Clean underneath your toenails carefully. Do not use a sharp object to clean underneath your toenails. Use the blunt end of a nail file or other rounded tool. ? Trim and file your toenails straight across to prevent ingrown toenails. Use a nail clipper, not scissors. Use an emery board to smooth the edges. · Change socks daily. Socks without seams are best, because seams often rub the feet. You can find socks for people with diabetes from specialty catalogs. · Look inside your shoes every day for things like gravel or torn linings, which could cause blisters or sores. · Buy shoes that fit well:  ? Look for shoes that have plenty of space around the toes. This helps prevent bunions and blisters. ? Try on shoes while wearing the kind of socks you will usually wear with the shoes. ? Avoid plastic shoes. They may rub your feet and cause blisters. Good shoes should be made of materials that are flexible and breathable, such as leather or cloth. ? Break in new shoes slowly by wearing them for no more than an hour a day for several days. Take extra time to check your feet for red areas, blisters, or other problems after you wear new shoes. · Do not go barefoot. Do not wear sandals, and do not wear shoes with very thin soles. Thin soles are easy to puncture. They also do not protect your feet from hot pavement or cold weather. · Have your doctor check your feet during each visit. If you have a foot problem, see your doctor.  Do not try to treat an early foot problem at home. Home remedies or treatments that you can buy without a prescription (such as corn removers) can be harmful. · Always get early treatment for foot problems. A minor irritation can lead to a major problem if not properly cared for early. When should you call for help? Call your doctor now or seek immediate medical care if:    · You have a foot sore, an ulcer or break in the skin that is not healing after 4 days, bleeding corns or calluses, or an ingrown toenail.     · You have blue or black areas, which can mean bruising or blood flow problems.     · You have peeling skin or tiny blisters between your toes or cracking or oozing of the skin.     · You have a fever for more than 24 hours and a foot sore.     · You have new numbness or tingling in your feet that does not go away after you move your feet or change positions.     · You have unexplained or unusual swelling of the foot or ankle. Watch closely for changes in your health, and be sure to contact your doctor if:    · You cannot do proper foot care. Where can you learn more? Go to http://www.gray.com/  Enter A739 in the search box to learn more about \"Diabetes Foot Health: Care Instructions. \"  Current as of: December 20, 2019               Content Version: 12.6  © 0420-5895 [employer], Incorporated. Care instructions adapted under license by [employer] (which disclaims liability or warranty for this information). If you have questions about a medical condition or this instruction, always ask your healthcare professional. [place] [address] any warranty or liability for your use of this information. Well Visit, Men 48 to 72: Care Instructions  Your Care Instructions     Physical exams can help you stay healthy. Your doctor has checked your overall health and may have suggested ways to take good care of yourself. He or she also may have recommended tests.  At home, you can help prevent illness with healthy eating, regular exercise, and other steps. Follow-up care is a key part of your treatment and safety. Be sure to make and go to all appointments, and call your doctor if you are having problems. It's also a good idea to know your test results and keep a list of the medicines you take. How can you care for yourself at home? · Reach and stay at a healthy weight. This will lower your risk for many problems, such as obesity, diabetes, heart disease, and high blood pressure. · Get at least 30 minutes of exercise on most days of the week. Walking is a good choice. You also may want to do other activities, such as running, swimming, cycling, or playing tennis or team sports. · Do not smoke. Smoking can make health problems worse. If you need help quitting, talk to your doctor about stop-smoking programs and medicines. These can increase your chances of quitting for good. · Protect your skin from too much sun. When you're outdoors from 10 a.m. to 4 p.m., stay in the shade or cover up with clothing and a hat with a wide brim. Wear sunglasses that block UV rays. Even when it's cloudy, put broad-spectrum sunscreen (SPF 30 or higher) on any exposed skin. · See a dentist one or two times a year for checkups and to have your teeth cleaned. · Wear a seat belt in the car. Follow your doctor's advice about when to have certain tests. These tests can spot problems early. · Cholesterol. Your doctor will tell you how often to have this done based on your overall health and other things that can increase your risk for heart attack and stroke. · Blood pressure. Have your blood pressure checked during a routine doctor visit. Your doctor will tell you how often to check your blood pressure based on your age, your blood pressure results, and other factors. · Prostate exam. Talk to your doctor about whether you should have a blood test (called a PSA test) for prostate cancer.  Experts recommend that you discuss the benefits and risks of the test with your doctor before you decide whether to have this test.  · Diabetes. Ask your doctor whether you should have tests for diabetes. · Vision. Some experts recommend that you have yearly exams for glaucoma and other age-related eye problems starting at age 48. · Hearing. Tell your doctor if you notice any change in your hearing. You can have tests to find out how well you hear. · Colorectal cancer. Your risk for colorectal cancer gets higher as you get older. Some experts say that adults should start regular screening at age 48 and stop at age 76. Others say to start before age 48 or continue after age 76. Talk with your doctor about your risk and when to start and stop screening. · Heart attack and stroke risk. At least every 4 to 6 years, you should have your risk for heart attack and stroke assessed. Your doctor uses factors such as your age, blood pressure, cholesterol, and whether you smoke or have diabetes to show what your risk for a heart attack or stroke is over the next 10 years. · Abdominal aortic aneurysm. Ask your doctor whether you should have a test to check for an aneurysm. You may need a test if you ever smoked or if your parent, brother, sister, or child has had an aneurysm. When should you call for help? Watch closely for changes in your health, and be sure to contact your doctor if you have any problems or symptoms that concern you. Where can you learn more? Go to http://www.gray.com/  Enter L463 in the search box to learn more about \"Well Visit, Men 48 to 72: Care Instructions. \"  Current as of: May 27, 2020               Content Version: 12.6  © 9099-9234 [email or web address]. Care instructions adapted under license by [employer] (which disclaims liability or warranty for this information).  If you have questions about a medical condition or this instruction, always ask your healthcare professional. [employer] disclaims any warranty or liability for your use of this information. High Blood Pressure: Care Instructions  Overview     It's normal for blood pressure to go up and down throughout the day. But if it stays up, you have high blood pressure. Another name for high blood pressure is hypertension. Despite what a lot of people think, high blood pressure usually doesn't cause headaches or make you feel dizzy or lightheaded. It usually has no symptoms. But it does increase your risk of stroke, heart attack, and other problems. You and your doctor will talk about your risks of these problems based on your blood pressure. Your doctor will give you a goal for your blood pressure. Your goal will be based on your health and your age. Lifestyle changes, such as eating healthy and being active, are always important to help lower blood pressure. You might also take medicine to reach your blood pressure goal.  Follow-up care is a key part of your treatment and safety. Be sure to make and go to all appointments, and call your doctor if you are having problems. It's also a good idea to know your test results and keep a list of the medicines you take. How can you care for yourself at home? Medical treatment  · If you stop taking your medicine, your blood pressure will go back up. You may take one or more types of medicine to lower your blood pressure. Be safe with medicines. Take your medicine exactly as prescribed. Call your doctor if you think you are having a problem with your medicine. · Talk to your doctor before you start taking aspirin every day. Aspirin can help certain people lower their risk of a heart attack or stroke. But taking aspirin isn't right for everyone, because it can cause serious bleeding. · See your doctor regularly. You may need to see the doctor more often at first or until your blood pressure comes down.   · If you are taking blood pressure medicine, talk to your doctor before you take decongestants or anti-inflammatory medicine, such as ibuprofen. Some of these medicines can raise blood pressure. · Learn how to check your blood pressure at home. Lifestyle changes  · Stay at a healthy weight. This is especially important if you put on weight around the waist. Losing even 10 pounds can help you lower your blood pressure. · If your doctor recommends it, get more exercise. Walking is a good choice. Bit by bit, increase the amount you walk every day. Try for at least 30 minutes on most days of the week. You also may want to swim, bike, or do other activities. · Avoid or limit alcohol. Talk to your doctor about whether you can drink any alcohol. · Try to limit how much sodium you eat to less than 2,300 milligrams (mg) a day. Your doctor may ask you to try to eat less than 1,500 mg a day. · Eat plenty of fruits (such as bananas and oranges), vegetables, legumes, whole grains, and low-fat dairy products. · Lower the amount of saturated fat in your diet. Saturated fat is found in animal products such as milk, cheese, and meat. Limiting these foods may help you lose weight and also lower your risk for heart disease. · Do not smoke. Smoking increases your risk for heart attack and stroke. If you need help quitting, talk to your doctor about stop-smoking programs and medicines. These can increase your chances of quitting for good. When should you call for help? Call  911 anytime you think you may need emergency care. This may mean having symptoms that suggest that your blood pressure is causing a serious heart or blood vessel problem. Your blood pressure may be over 180/120. For example, call 911 if:    · You have symptoms of a heart attack. These may include:  ? Chest pain or pressure, or a strange feeling in the chest.  ? Sweating. ? Shortness of breath. ? Nausea or vomiting.   ? Pain, pressure, or a strange feeling in the back, neck, jaw, or upper belly or in one or both shoulders or arms. ? Lightheadedness or sudden weakness. ? A fast or irregular heartbeat.     · You have symptoms of a stroke. These may include:  ? Sudden numbness, tingling, weakness, or loss of movement in your face, arm, or leg, especially on only one side of your body. ? Sudden vision changes. ? Sudden trouble speaking. ? Sudden confusion or trouble understanding simple statements. ? Sudden problems with walking or balance. ? A sudden, severe headache that is different from past headaches.     · You have severe back or belly pain. Do not wait until your blood pressure comes down on its own. Get help right away. Call your doctor now or seek immediate care if:    · Your blood pressure is much higher than normal (such as 180/120 or higher), but you don't have symptoms.     · You think high blood pressure is causing symptoms, such as:  ? Severe headache.  ? Blurry vision. Watch closely for changes in your health, and be sure to contact your doctor if:    · Your blood pressure measures higher than your doctor recommends at least 2 times. That means the top number is higher or the bottom number is higher, or both.     · You think you may be having side effects from your blood pressure medicine. Where can you learn more? Go to http://www.gray.com/  Enter [id number] in the search box to learn more about \"High Blood Pressure: Care Instructions. \"  Current as of: December 16, 2019               Content Version: 12.6  © 6922-6997 Healthwise, Incorporated. Care instructions adapted under license by [employer] (which disclaims liability or warranty for this information). If you have questions about a medical condition or this instruction, always ask your healthcare professional. [address] any warranty or liability for your use of this information.

## 2020-11-16 NOTE — PROGRESS NOTES
Chief Complaint   Patient presents with   [address] [place] [address]     1. Have you been to the ER, urgent care clinic since your last visit? Hospitalized since your last visit? Yes Reason for visit: Fall    2. Have you seen or consulted any other health care providers outside of the [address] [place] [address] since your last visit? Include any pap smears or colon screening.  Yes When: 12-

## 2020-11-17 NOTE — PROGRESS NOTES
Subjective:  Meeta Buckley is a 72 y.o. male and presents with Hypertension, DM II, Hyperlipidemia, & Asthma to  Establish Care  Per the patient he has been incarcerated for over 25 years and wants to establish care to improve his overall health. Hypertension Review:  The patient has essential hypertension which is not well managed, 156/91 at today's visit. Diet and Lifestyle: generally follows a  low sodium diet, exercises sporadically  Home BP Monitoring: is not measured at home. Pertinent ROS: taking medications as instructed, no medication side effects noted, no TIA's, no chest pain on exertion, no dyspnea on exertion, no swelling of ankles. Diabetes Mellitus Review:  Patient is presenting for evaluation and follow up for Diabetes Mellitus Type II. Diabetic ROS - medication compliance: compliant most of the time, diabetic diet compliance: compliant most of the time,   Known diabetic complications: none  Cardiovascular risk factors: family history, dyslipidemia, diabetes mellitus, obesity, hypertension  Current diabetic medications include: Metformin and  insulin   Eye exam current (within one year): no  Weight trend: stable  Prior visit with dietician: no  Current diet: \"healthy\" diet  in general  Current exercise: walking  Any episodes of hypoglycemia? No    Hyperlipidemia Review:  Patient presents for follow up and evaluation of lipids. A repeat fasting lipid profile was done at today's office visit. Patient claims that he is taking medications as prescribed and is adhering to dietary recommendations. Patient states that he does not exercise regularly, was advised of exercise recommendations of 150 minutes a week. The patient does not use medications that may worsen dyslipidemias (corticosteroids, progestins, anabolic steroids, amiodarone, cyclosporine, olanzapine).      Asthma Review:  The patient is being seen for follow up of asthma,  Not currently in any distress, (nighttime wheezing has been reported)   Asthma symptoms have occured infrequently. Wheezing when present is described as mild and easily relieved with rescue bronchodilator. The patient does report use of a steroid inhaler but admits that he has not had his inhaler recently. Frequency of use of quick-relief meds: infrequent   Regimen compliance: The patient reports adherence to this regimen. Review of Systems  Constitutional: negative for fevers, chills, anorexia and weight loss  Eyes:   negative for visual disturbance and irritation  ENT:   negative for tinnitus,sore throat,nasal congestion,ear pains. hoarseness  Respiratory:  negative for cough, hemoptysis, dyspnea,wheezing  CV:   negative for chest pain, palpitations, lower extremity edema  GI:   negative for nausea, vomiting, diarrhea, abdominal pain,melena  Endo:               negative for polyuria,polydipsia,polyphagia,heat intolerance  Genitourinary: negative for frequency, dysuria and hematuria  Integument:  negative for rash and pruritus  Hematologic:  negative for easy bruising and gum/nose bleeding  Musculoskel: negative for myalgias, arthralgias, back pain, muscle weakness, joint pain  Neurological:  negative for headaches, dizziness, vertigo, memory problems and gait   Behavl/Psych: negative for feelings of anxiety, depression, mood changes    Past Medical History:   Diagnosis Date    CAD (coronary artery disease)     Diabetes ([facility]     GERD (gastroesophageal reflux disease)     [facility]     Hypertension      Past Surgical History:   Procedure Laterality Date    HX CORONARY ARTERY BYPASS GRAFT      [address]     Social History     Socioeconomic History    Marital status:      Spouse name: Not on file    Number of children: Not on file    Years of education: Not on file    Highest education level: Not on file   Tobacco Use    Smoking status: Current Every Day Smoker     Packs/day: 0.50     Types: Cigarettes    Smokeless tobacco: Never Used   Substance and Sexual Activity    Alcohol use: No     Frequency: Never    Drug use: Not Currently    Sexual activity: Not Currently     No family history on file. Current Outpatient Medications   Medication Sig Dispense Refill    albuterol (PROVENTIL HFA, VENTOLIN HFA, PROAIR HFA) 90 mcg/actuation inhaler Take 2 Puffs by inhalation every four (4) hours as needed for Wheezing for up to 360 days. 1 Inhaler 2    Insulin Needles, Disposable, (Comfort EZ Pen Needles) 32 gauge x 5/32\" ndle E11.6 use for insulin SQ injection as directed 100 Pen Needle 2    Insulin Syringe-Needle U-100 0.5 mL 30 gauge x 5/16\" syrg Use as directed  Syringe 3    insulin regular (NOVOLIN R, HUMULIN R) 100 unit/mL injection 10 Units by SubCUTAneous route two (2) times a day. 3 Vial 2    insulin glargine (Lantus Solostar U-100 Insulin) 100 unit/mL (3 mL) inpn 30 Units by SubCUTAneous route two (2) times a day. 6 Pen 1    isosorbide-hydrALAZINE (BiDiL) 20-37.5 mg per tablet Take 1 Tab by mouth three (3) times daily. 90 Tab 0    atorvastatin (LIPITOR) 40 mg tablet Take 1 Tab by mouth daily. 30 Tab 0    carvediloL (COREG) 25 mg tablet Take 1 Tab by mouth two (2) times daily (with meals). 60 Tab 0    hydroCHLOROthiazide (HYDRODIURIL) 25 mg tablet Take 1 Tab by mouth daily. 30 Tab 0    metFORMIN (GLUCOPHAGE) 1,000 mg tablet Take 1 Tab by mouth two (2) times daily (with meals). 60 Tab 0    omeprazole (PRILOSEC) 40 mg capsule Take 1 Cap by mouth daily. 30 Cap 0    tamsulosin (FLOMAX) 0.4 mg capsule Take 1 Cap by mouth daily. 30 Cap 0    furosemide (LASIX) 40 mg tablet Take 1 Tab by mouth daily. 30 Tab 0    aspirin 81 mg chewable tablet Take 1 Tab by mouth daily. 90 Tab 1    amitriptyline (ELAVIL) 25 mg tablet Take 1 Tab by mouth nightly.  30 Tab 0    ergocalciferol (ERGOCALCIFEROL) 1,250 mcg (50,000 unit) capsule TK 1 C PO 1 TIME A WK       No Known Allergies    Objective:  Visit Vitals  BP (!) 156/91 (BP 1 Location: Left arm, BP Patient Position: Sitting)   Pulse 81   Temp 98.3 °F (36.8 °C) (Oral)   Resp 16   Ht 6' 1\" (1.854 m)   Wt 253 lb 8 oz (115 kg)   SpO2 100%   BMI 33.45 kg/m²     Physical Exam:   General appearance - alert, well appearing, and in no distress  Mental status - alert, oriented to person, place, and time  EYE-JUAN, EOMI, corneas normal, no foreign bodies  ENT-ENT exam normal, no neck nodes or sinus tenderness  Nose - normal and patent, no erythema, discharge or polyps  Mouth - mucous membranes moist, pharynx normal without lesions  Neck - supple, no significant adenopathy   Chest - clear to auscultation, no wheezes, rales or rhonchi, symmetric air entry   Heart - normal rate, regular rhythm, normal S1, S2, no murmurs, rubs, clicks or gallops   Abdomen - soft, nontender, nondistended, no masses or organomegaly  Lymph- no adenopathy palpable  Ext-peripheral pulses normal, no pedal edema, no clubbing or cyanosis  Skin-Warm and dry. no hyperpigmentation, vitiligo, or suspicious lesions  Neuro -alert, oriented, normal speech, no focal findings or movement disorder noted  Neck-normal C-spine, no tenderness, full ROM without pain  Feet-no nail deformities or callus formation with good pulses noted      Results for orders placed or performed in visit on 11/16/20   CBC W/O DIFF   Result Value Ref Range    WBC 8.0 4.1 - 11.1 K/uL    RBC 5.40 4. 10 - 5.70 M/uL    HGB 16.8 12.1 - 17.0 g/dL    HCT 50.8 (H) 36.6 - 50.3 %    MCV 94.1 80.0 - 99.0 FL    MCH 31.1 26.0 - 34.0 PG    MCHC 33.1 30.0 - 36.5 g/dL    RDW 13.2 11.5 - 14.5 %    PLATELET 266 864 - 085 K/uL    MPV 12.3 8.9 - 12.9 FL    NRBC 0.0 0  WBC    ABSOLUTE NRBC 0.00 0.00 - 0.01 K/uL   SAMPLES BEING HELD   Result Value Ref Range    SAMPLES BEING HELD 1SST     COMMENT        Add-on orders for these samples will be processed based on acceptable specimen integrity and analyte stability, which may vary by analyte.    AMB POC HEMOGLOBIN A1C   Result Value Ref Range    Hemoglobin A1c (POC) 8.7 %       Assessment/Plan:    ICD-10-CM ICD-9-CM    1. Establishing care with new doctor, encounter for  Z76.89 V65.8 AMB POC HEMOGLOBIN A1C      AMB POC URINALYSIS DIP STICK AUTO W/O MICRO      CBC W/O DIFF      METABOLIC PANEL, COMPREHENSIVE      METABOLIC PANEL, COMPREHENSIVE      CBC W/O DIFF      CANCELED: AMB POC GLUCOSE BLOOD, BY GLUCOSE MONITORING DEVICE      CANCELED: AMB POC LIPID PROFILE   2. Essential hypertension  I10 401.9    3. Coronary artery disease with angina pectoris, unspecified vessel or lesion type, unspecified whether native or transplanted heart ([facility] [place] [facility]  I25.119 414.00      413.9    4. Type II diabetes mellitus with complication (HCC)  K39.4 250.90  DIABETES FOOT EXAM      MICROALBUMIN, UR, RAND W/ MICROALB/CREAT RATIO       DIABETES EYE EXAM      REFERRAL TO OPTOMETRY   5. Diabetic peripheral vascular disorder (HCC)  E11.51 250.70  DIABETES FOOT EXAM     443.81 REFERRAL TO PODIATRY   6. History of asthma  Z87.09 V12.69 albuterol (PROVENTIL HFA, VENTOLIN HFA, PROAIR HFA) 90 mcg/actuation inhaler      DISCONTINUED: albuterol (PROVENTIL HFA, VENTOLIN HFA, PROAIR HFA) 90 mcg/actuation inhaler   7. Healthcare maintenance  Z00.00 V70.0 CBC W/O DIFF      METABOLIC PANEL, COMPREHENSIVE       DIABETES FOOT EXAM      MICROALBUMIN, UR, RAND W/ MICROALB/CREAT RATIO       DIABETES EYE EXAM      HEPATITIS C AB      REFERRAL FOR COLONOSCOPY      REFERRAL TO OPTOMETRY      LIPID PANEL      LIPID PANEL      HEPATITIS C AB      METABOLIC PANEL, COMPREHENSIVE      CBC W/O DIFF   8. Comprehensive diabetic foot examination, type 2 DM, encounter for ([facility] [place] [facility]  E11.9 250.00  DIABETES FOOT EXAM      REFERRAL TO PODIATRY   9. Encounter for diabetes type 2 eye exam ([facility] [place] [facility]  Z01.00 V72.0  DIABETES EYE EXAM    E11.9 250.00 REFERRAL TO OPTOMETRY   10. Screen for colon cancer  Z12.11 V76.51 REFERRAL FOR COLONOSCOPY   11.  Encounter for hepatitis C screening test for low risk patient  Z11.59 V73.89 HEPATITIS C AB      HEPATITIS C AB   12.  Needs flu shot  Z23 V04.81 FLU (FLUAD QUAD INFLUENZA VACCINE,QUAD,ADJUVANTED)     Orders Placed This Encounter    Influenza Vaccine, QUAD, 65 Yrs +  IM  (Fluad [id number] )    CBC W/O DIFF     Standing Status:   Future     Number of Occurrences:   1     Standing Expiration Date:   28/82/3174    METABOLIC PANEL, COMPREHENSIVE     Standing Status:   Future     Number of Occurrences:   1     Standing Expiration Date:   11/16/2021    MICROALBUMIN, UR, RAND W/ MICROALB/CREAT RATIO ([id number])     Standing Status:   Future     Standing Expiration Date:   11/16/2021    HEPATITIS C AB     Standing Status:   Future     Number of Occurrences:   1     Standing Expiration Date:   5/16/2021    LIPID PANEL     Standing Status:   Future     Number of Occurrences:   1     Standing Expiration Date:   11/16/2021    SAMPLES BEING HELD     ATLASSITEID:[id number]    Referral for Colonoscopy (options for GI, Colon &  Rectal Surgery, & General Surgery)     Referral Priority:   Routine     Referral Type:   Consultation     Referral Reason:   Specialty Services Required     Referred to Provider:   Foster Hairston MD     Requested Specialty:   Gastroenterology     Number of Visits Requested:   1    REFERRAL TO OPTOMETRY     Referral Priority:   Routine     Referral Type:   Consultation     Referral Reason:   Specialty Services Required     Referred to Provider:   Joann Mayes MD     Requested Specialty:   Ophthalmology     Number of Visits Requested:   1    REFERRAL TO PODIATRY     Referral Priority:   Routine     Referral Type:   Consultation     Referral Reason:   Specialty Services Required     Referred to Provider:   Kwadwo Ying DPM     Requested Specialty:   Podiatry     Number of Visits Requested:   1    AMB POC HEMOGLOBIN A1C    AMB POC URINALYSIS DIP STICK AUTO W/O MICRO    HM DIABETES FOOT EXAM    HM DIABETES EYE EXAM    DISCONTD: albuterol (PROVENTIL HFA, VENTOLIN HFA, PROAIR HFA) 90 mcg/actuation inhaler     Sig: Take 2 Puffs by inhalation every four (4) hours as needed for Wheezing for up to 360 days. Dispense:  1 Inhaler     Refill:  5    albuterol (PROVENTIL HFA, VENTOLIN HFA, PROAIR HFA) 90 mcg/actuation inhaler     Sig: Take 2 Puffs by inhalation every four (4) hours as needed for Wheezing for up to 360 days. Dispense:  1 Inhaler     Refill:  2     Patient Instructions          Learning About Meal Planning for Diabetes  Why plan your meals? Meal planning can be a key part of managing diabetes. Planning meals and snacks with the right balance of carbohydrate, protein, and fat can help you keep your blood sugar at the target level you set with your doctor. You don't have to eat special foods. You can eat what your family eats, including sweets once in a while. But you do have to pay attention to how often you eat and how much you eat of certain foods. You may want to work with a dietitian or a certified diabetes educator. He or she can give you tips and meal ideas and can answer your questions about meal planning. This health professional can also help you reach a healthy weight if that is one of your goals. What plan is right for you? Your dietitian or diabetes educator may suggest that you start with the plate format or carbohydrate counting. The plate format  The plate format is a simple way to help you manage how you eat. You plan meals by learning how much space each food should take on a plate. Using the plate format helps you spread carbohydrate throughout the day. It can make it easier to keep your blood sugar level within your target range. It also helps you see if you're eating healthy portion sizes. To use the plate format, you put non-starchy vegetables on half your plate. Add meat or meat substitutes on one-quarter of the plate. Put a grain or starchy vegetable (such as brown rice or a potato) on the final quarter of the plate.  You can add a small piece of fruit and some low-fat or fat-free milk or yogurt, depending on your carbohydrate goal for each meal.  Here are some tips for using the plate format:  · Make sure that you are not using an oversized plate. A 9-inch plate is best. Many restaurants use larger plates. · Get used to using the plate format at home. Then you can use it when you eat out. · Write down your questions about using the plate format. Talk to your doctor, a dietitian, or a diabetes educator about your concerns. Carbohydrate counting  With carbohydrate counting, you plan meals based on the amount of carbohydrate in each food. Carbohydrate raises blood sugar higher and more quickly than any other nutrient. It is found in desserts, breads and cereals, and fruit. It's also found in starchy vegetables such as potatoes and corn, grains such as rice and pasta, and milk and yogurt. Spreading carbohydrate throughout the day helps keep your blood sugar levels within your target range. Your daily amount depends on several things, including your weight, how active you are, which diabetes medicines you take, and what your goals are for your blood sugar levels. A registered dietitian or diabetes educator can help you plan how much carbohydrate to include in each meal and snack. A guideline for your daily amount of carbohydrate is:  · 45 to 60 grams at each meal. That's about the same as 3 to 4 carbohydrate servings. · 15 to 20 grams at each snack. That's about the same as 1 carbohydrate serving. The Nutrition Facts label on packaged foods tells you how much carbohydrate is in a serving of the food. First, look at the serving size on the food label. Is that the amount you eat in a serving? All of the nutrition information on a food label is based on that serving size. So if you eat more or less than that, you'll need to adjust the other numbers. Total carbohydrate is the next thing you need to look for on the label.  If you count carbohydrate servings, one serving of carbohydrate is 15 grams. For foods that don't come with labels, such as fresh fruits and vegetables, you'll need a guide that lists carbohydrate in these foods. Ask your doctor, dietitian, or diabetes educator about books or other nutrition guides you can use. If you take insulin, you need to know how many grams of carbohydrate are in a meal. This lets you know how much rapid-acting insulin to take before you eat. If you use an insulin pump, you get a constant rate of insulin during the day. So the pump must be programmed at meals to give you extra insulin to cover the rise in blood sugar after meals. When you know how much carbohydrate you will eat, you can take the right amount of insulin. Or, if you always use the same amount of insulin, you need to make sure that you eat the same amount of carbohydrate at meals. If you need more help to understand carbohydrate counting and food labels, ask your doctor, dietitian, or diabetes educator. How do you get started with meal planning? Here are some tips to get started:  · Plan your meals a week at a time. Don't forget to include snacks too. · Use cookbooks or online recipes to plan several main meals. Plan some quick meals for busy nights. You also can double some recipes that freeze well. Then you can save half for other busy nights when you don't have time to cook. · Make sure you have the ingredients you need for your recipes. If you're running low on basic items, put these items on your shopping list too. · List foods that you use to make breakfasts, lunches, and snacks. List plenty of fruits and vegetables. · Post this list on the refrigerator. Add to it as you think of more things you need. · Take the list to the store to do your weekly shopping. Follow-up care is a key part of your treatment and safety. Be sure to make and go to all appointments, and call your doctor if you are having problems.  It's also a good idea to know your test results and keep a list of the medicines you take. Where can you learn more? Go to http://www.gray.com/  Enter T579 in the search box to learn more about \"Learning About Meal Planning for Diabetes. \"  Current as of: December 20, 2019               Content Version: 12.6  © 9817-9560 [employer]. Care instructions adapted under license by [employer] (which disclaims liability or warranty for this information). If you have questions about a medical condition or this instruction, always ask your healthcare professional. [address] any warranty or liability for your use of this information. Diabetes Foot Health: Care Instructions  Your Care Instructions     When you have diabetes, your feet need extra care and attention. Diabetes can damage the nerve endings and blood vessels in your feet, making you less likely to notice when your feet are injured. Diabetes also limits your body's ability to fight infection and get blood to areas that need it. If you get a minor foot injury, it could become an ulcer or a serious infection. With good foot care, you can prevent most of these problems. Caring for your feet can be quick and easy. Most of the care can be done when you are bathing or getting ready for bed. Follow-up care is a key part of your treatment and safety. Be sure to make and go to all appointments, and call your doctor if you are having problems. It's also a good idea to know your test results and keep a list of the medicines you take. How can you care for yourself at home? · Keep your blood sugar close to normal by watching what and how much you eat, monitoring blood sugar, taking medicines if prescribed, and getting regular exercise. · Do not smoke. Smoking affects blood flow and can make foot problems worse. If you need help quitting, talk to your doctor about stop-smoking programs and medicines.  These can increase your chances of quitting for good. · Eat a diet that is low in fats. High fat intake can cause fat to build up in your blood vessels and decrease blood flow. · Inspect your feet daily for blisters, cuts, cracks, or sores. If you cannot see well, use a mirror or have someone help you. · Take care of your feet:  ? Wash your feet every day. Use warm (not hot) water. Check the water temperature with your wrists or other part of your body, not your feet. ? Dry your feet well. Pat them dry. Do not rub the skin on your feet too hard. Dry well between your toes. If the skin on your feet stays moist, bacteria or a fungus can grow, which can lead to infection. ? Keep your skin soft. Use moisturizing skin cream to keep the skin on your feet soft and prevent calluses and cracks. But do not put the cream between your toes, and stop using any cream that causes a rash. ? Clean underneath your toenails carefully. Do not use a sharp object to clean underneath your toenails. Use the blunt end of a nail file or other rounded tool. ? Trim and file your toenails straight across to prevent ingrown toenails. Use a nail clipper, not scissors. Use an emery board to smooth the edges. · Change socks daily. Socks without seams are best, because seams often rub the feet. You can find socks for people with diabetes from specialty catalogs. · Look inside your shoes every day for things like gravel or torn linings, which could cause blisters or sores. · Buy shoes that fit well:  ? Look for shoes that have plenty of space around the toes. This helps prevent bunions and blisters. ? Try on shoes while wearing the kind of socks you will usually wear with the shoes. ? Avoid plastic shoes. They may rub your feet and cause blisters. Good shoes should be made of materials that are flexible and breathable, such as leather or cloth. ? Break in new shoes slowly by wearing them for no more than an hour a day for several days.  Take extra time to check your feet for red areas, blisters, or other problems after you wear new shoes. · Do not go barefoot. Do not wear sandals, and do not wear shoes with very thin soles. Thin soles are easy to puncture. They also do not protect your feet from hot pavement or cold weather. · Have your doctor check your feet during each visit. If you have a foot problem, see your doctor. Do not try to treat an early foot problem at home. Home remedies or treatments that you can buy without a prescription (such as corn removers) can be harmful. · Always get early treatment for foot problems. A minor irritation can lead to a major problem if not properly cared for early. When should you call for help? Call your doctor now or seek immediate medical care if:    · You have a foot sore, an ulcer or break in the skin that is not healing after 4 days, bleeding corns or calluses, or an ingrown toenail.     · You have blue or black areas, which can mean bruising or blood flow problems.     · You have peeling skin or tiny blisters between your toes or cracking or oozing of the skin.     · You have a fever for more than 24 hours and a foot sore.     · You have new numbness or tingling in your feet that does not go away after you move your feet or change positions.     · You have unexplained or unusual swelling of the foot or ankle. Watch closely for changes in your health, and be sure to contact your doctor if:    · You cannot do proper foot care. Where can you learn more? Go to http://www.gray.com/  Enter A739 in the search box to learn more about \"Diabetes Foot Health: Care Instructions. \"  Current as of: December 20, 2019               Content Version: 12.6  © 4322-6998 [employer]. Care instructions adapted under license by [employer] (which disclaims liability or warranty for this information).  If you have questions about a medical condition or this instruction, always ask your healthcare professional. [place] [address] any warranty or liability for your use of this information. [facility], Men 48 to 72: Care Instructions  Your Care Instructions     Physical exams can help you stay healthy. Your doctor has checked your overall health and may have suggested ways to take good care of yourself. He or she also may have recommended tests. At home, you can help prevent illness with healthy eating, regular exercise, and other steps. Follow-up care is a key part of your treatment and safety. Be sure to make and go to all appointments, and call your doctor if you are having problems. It's also a good idea to know your test results and keep a list of the medicines you take. How can you care for yourself at home? · Reach and stay at a healthy weight. This will lower your risk for many problems, such as obesity, diabetes, heart disease, and high blood pressure. · Get at least 30 minutes of exercise on most days of the week. Walking is a good choice. You also may want to do other activities, such as running, swimming, cycling, or playing tennis or team sports. · Do not smoke. Smoking can make health problems worse. If you need help quitting, talk to your doctor about stop-smoking programs and medicines. These can increase your chances of quitting for good. · Protect your skin from too much sun. When you're outdoors from 10 a.m. to 4 p.m., stay in the shade or cover up with clothing and a hat with a wide brim. Wear sunglasses that block UV rays. Even when it's cloudy, put broad-spectrum sunscreen (SPF 30 or higher) on any exposed skin. · See a dentist one or two times a year for checkups and to have your teeth cleaned. · Wear a seat belt in the car. Follow your doctor's advice about when to have certain tests. These tests can spot problems early. · Cholesterol.  Your doctor will tell you how often to have this done based on your overall health and other things that can increase your risk for heart attack and stroke. · Blood pressure. Have your blood pressure checked during a routine doctor visit. Your doctor will tell you how often to check your blood pressure based on your age, your blood pressure results, and other factors. · Prostate exam. Talk to your doctor about whether you should have a blood test (called a PSA test) for prostate cancer. Experts recommend that you discuss the benefits and risks of the test with your doctor before you decide whether to have this test.  · Diabetes. Ask your doctor whether you should have tests for diabetes. · Vision. Some experts recommend that you have yearly exams for glaucoma and other age-related eye problems starting at age 48. · Hearing. Tell your doctor if you notice any change in your hearing. You can have tests to find out how well you hear. · Colorectal cancer. Your risk for colorectal cancer gets higher as you get older. Some experts say that adults should start regular screening at age 48 and stop at age 76. Others say to start before age 48 or continue after age 76. Talk with your doctor about your risk and when to start and stop screening. · Heart attack and stroke risk. At least every 4 to 6 years, you should have your risk for heart attack and stroke assessed. Your doctor uses factors such as your age, blood pressure, cholesterol, and whether you smoke or have diabetes to show what your risk for a heart attack or stroke is over the next 10 years. · Abdominal aortic aneurysm. Ask your doctor whether you should have a test to check for an aneurysm. You may need a test if you ever smoked or if your parent, brother, sister, or child has had an aneurysm. When should you call for help? Watch closely for changes in your health, and be sure to contact your doctor if you have any problems or symptoms that concern you. Where can you learn more?   Go to http://www.gray.com/  Enter [id number] in the search box to learn more about \"Well Visit, Men 48 to 72: Care Instructions. \"  Current as of: May 27, 2020               Content Version: 12.6  © 2006-2020 [employer]. Care instructions adapted under license by [employer] (which disclaims liability or warranty for this information). If you have questions about a medical condition or this instruction, always ask your healthcare professional. [address] any warranty or liability for your use of this information. High Blood Pressure: Care Instructions  Overview     It's normal for blood pressure to go up and down throughout the day. But if it stays up, you have high blood pressure. Another name for high blood pressure is hypertension. Despite what a lot of people think, high blood pressure usually doesn't cause headaches or make you feel dizzy or lightheaded. It usually has no symptoms. But it does increase your risk of stroke, heart attack, and other problems. You and your doctor will talk about your risks of these problems based on your blood pressure. Your doctor will give you a goal for your blood pressure. Your goal will be based on your health and your age. Lifestyle changes, such as eating healthy and being active, are always important to help lower blood pressure. You might also take medicine to reach your blood pressure goal.  Follow-up care is a key part of your treatment and safety. Be sure to make and go to all appointments, and call your doctor if you are having problems. It's also a good idea to know your test results and keep a list of the medicines you take. How can you care for yourself at home? Medical treatment  · If you stop taking your medicine, your blood pressure will go back up. You may take one or more types of medicine to lower your blood pressure. Be safe with medicines. Take your medicine exactly as prescribed.  Call your doctor if you think you are having a problem with your medicine. · Talk to your doctor before you start taking aspirin every day. Aspirin can help certain people lower their risk of a heart attack or stroke. But taking aspirin isn't right for everyone, because it can cause serious bleeding. · See your doctor regularly. You may need to see the doctor more often at first or until your blood pressure comes down. · If you are taking blood pressure medicine, talk to your doctor before you take decongestants or anti-inflammatory medicine, such as ibuprofen. Some of these medicines can raise blood pressure. · Learn how to check your blood pressure at home. Lifestyle changes  · Stay at a healthy weight. This is especially important if you put on weight around the waist. Losing even 10 pounds can help you lower your blood pressure. · If your doctor recommends it, get more exercise. Walking is a good choice. Bit by bit, increase the amount you walk every day. Try for at least 30 minutes on most days of the week. You also may want to swim, bike, or do other activities. · Avoid or limit alcohol. Talk to your doctor about whether you can drink any alcohol. · Try to limit how much sodium you eat to less than 2,300 milligrams (mg) a day. Your doctor may ask you to try to eat less than 1,500 mg a day. · Eat plenty of fruits (such as bananas and oranges), vegetables, legumes, whole grains, and low-fat dairy products. · Lower the amount of saturated fat in your diet. Saturated fat is found in animal products such as milk, cheese, and meat. Limiting these foods may help you lose weight and also lower your risk for heart disease. · Do not smoke. Smoking increases your risk for heart attack and stroke. If you need help quitting, talk to your doctor about stop-smoking programs and medicines. These can increase your chances of quitting for good. When should you call for help? Call  911 anytime you think you may need emergency care.  This may mean having symptoms that suggest that your blood pressure is causing a serious heart or blood vessel problem. Your blood pressure may be over 180/120. For example, call 911 if:    · You have symptoms of a heart attack. These may include:  ? Chest pain or pressure, or a strange feeling in the chest.  ? Sweating. ? Shortness of breath. ? Nausea or vomiting. ? Pain, pressure, or a strange feeling in the back, neck, jaw, or upper belly or in one or both shoulders or arms. ? Lightheadedness or sudden weakness. ? A fast or irregular heartbeat.     · You have symptoms of a stroke. These may include:  ? Sudden numbness, tingling, weakness, or loss of movement in your face, arm, or leg, especially on only one side of your body. ? Sudden vision changes. ? Sudden trouble speaking. ? Sudden confusion or trouble understanding simple statements. ? Sudden problems with walking or balance. ? A sudden, severe headache that is different from past headaches.     · You have severe back or belly pain. Do not wait until your blood pressure comes down on its own. Get help right away. Call your doctor now or seek immediate care if:    · Your blood pressure is much higher than normal (such as 180/120 or higher), but you don't have symptoms.     · You think high blood pressure is causing symptoms, such as:  ? Severe headache.  ? Blurry vision. Watch closely for changes in your health, and be sure to contact your doctor if:    · Your blood pressure measures higher than your doctor recommends at least 2 times. That means the top number is higher or the bottom number is higher, or both.     · You think you may be having side effects from your blood pressure medicine. Where can you learn more? Go to http://www.gray.com/  Enter [id number] in the search box to learn more about \"High Blood Pressure: Care Instructions. \"  Current as of: December 16, 2019               Content Version: 12.6  © 0570-9134 [employer].    Care instructions adapted under license by [employer] (which disclaims liability or warranty for this information). If you have questions about a medical condition or this instruction, always ask your healthcare professional. [address] any warranty or liability for your use of this information. Follow-up and Dispositions    · Return in about 2 weeks (around 11/30/2020), or if symptoms worsen or fail to improve, for Lab results and Pneumonia Vaccine. I have reviewed with the patient details of the assessment and plan and all questions were answered. Relevent patient education was performed. The most recent lab findings were reviewed with the patient. An After Visit Summary was printed and given to the patient.

## 2020-11-18 NOTE — PROGRESS NOTES
Results were received and reviewed. Patient has a follow-up appointment on 12-7-2020 to discuss test results.

## 2020-11-20 ENCOUNTER — DOCUMENTATION ONLY (OUTPATIENT)
Dept: FAMILY MEDICINE CLINIC | Age: 70
End: 2020-11-20

## 2020-11-20 NOTE — PROGRESS NOTES
BiDil 20-37.5MG tablets has been rejected by insurance  Prior Auth completed again.    Pending Results

## 2020-12-07 ENCOUNTER — OFFICE VISIT (OUTPATIENT)
Dept: INTERNAL MEDICINE CLINIC | Age: 70
End: 2020-12-07
Payer: COMMERCIAL

## 2020-12-07 VITALS
HEIGHT: 73 IN | OXYGEN SATURATION: 96 % | DIASTOLIC BLOOD PRESSURE: 72 MMHG | SYSTOLIC BLOOD PRESSURE: 128 MMHG | WEIGHT: 256.3 LBS | HEART RATE: 72 BPM | TEMPERATURE: 98.2 F | RESPIRATION RATE: 16 BRPM | BODY MASS INDEX: 33.97 KG/M2

## 2020-12-07 DIAGNOSIS — Z23 ENCOUNTER FOR IMMUNIZATION: ICD-10-CM

## 2020-12-07 DIAGNOSIS — R53.83 FATIGUE, UNSPECIFIED TYPE: ICD-10-CM

## 2020-12-07 DIAGNOSIS — R76.8 POSITIVE HEPATITIS C ANTIBODY TEST: ICD-10-CM

## 2020-12-07 DIAGNOSIS — H91.93 BILATERAL HEARING LOSS, UNSPECIFIED HEARING LOSS TYPE: ICD-10-CM

## 2020-12-07 DIAGNOSIS — Z71.2 ENCOUNTER TO DISCUSS TEST RESULTS: Primary | ICD-10-CM

## 2020-12-07 DIAGNOSIS — Z23 NEED FOR PROPHYLACTIC VACCINATION AGAINST STREPTOCOCCUS PNEUMONIAE (PNEUMOCOCCUS) AND INFLUENZA: ICD-10-CM

## 2020-12-07 DIAGNOSIS — N52.9 ERECTILE DYSFUNCTION, UNSPECIFIED ERECTILE DYSFUNCTION TYPE: ICD-10-CM

## 2020-12-07 LAB
COMMENT, HOLDF: NORMAL
SAMPLES BEING HELD,HOLD: NORMAL

## 2020-12-07 PROCEDURE — 90471 IMMUNIZATION ADMIN: CPT

## 2020-12-07 PROCEDURE — 99214 OFFICE O/P EST MOD 30 MIN: CPT | Performed by: NURSE PRACTITIONER

## 2020-12-07 PROCEDURE — 90732 PPSV23 VACC 2 YRS+ SUBQ/IM: CPT

## 2020-12-07 NOTE — PROGRESS NOTES
Chief Complaint   Patient presents with    Results    Immunization/Injection     1. Have you been to the ER, urgent care clinic since your last visit? Hospitalized since your last visit? No    2. Have you seen or consulted any other health care providers outside of the [address] [place] [address] since your last visit? Include any pap smears or colon screening. Lorraine  Jacqui Henderson is a 72 y.o. male  who presents for routine immunizations. He denies any symptoms , reactions or allergies that would exclude them from being immunized today. Risks and adverse reactions were discussed and the VIS was given to them. All questions were addressed. He was observed for 5 min post injection. There were no reactions observed.     Anthony Gonzalez

## 2020-12-07 NOTE — PATIENT INSTRUCTIONS
Erection Problems: Care Instructions  Overview     A man has erection problems when he routinely can't get or keep an erection that allows satisfactory sex. He may not be able to have an erection at any time. Or he may not be able to have one that is firm enough or lasts long enough to complete intercourse. The problem is also called erectile dysfunction (ED). It's not the same as having trouble getting an erection now and then. That's common. It happens to most men at some time. Erection problems can be caused by problems with the blood vessels, nerves, or hormones. They can be caused by diabetes, heart disease, and injuries. Nerve problems also can cause them. Medicines, alcohol, and tobacco also can cause problems. So can depression, stress, grief, or relationship problems. Follow-up care is a key part of your treatment and safety. Be sure to make and go to all appointments, and call your doctor if you are having problems. It's also a good idea to know your test results and keep a list of the medicines you take. How can you care for yourself at home? Lifestyle    · Limit alcohol. Have no more than 2 drinks a day.     · Do not smoke. Smoking makes it harder for the blood vessels in the penis to relax and let blood flow in. If you need help quitting, talk to your doctor about stop-smoking programs and medicines. These can increase your chances of quitting for good.     · Do not use cocaine, heroin, or other illegal drugs.     · Try to reduce stress.     · Give yourself time to adjust to change. Changes in your job, family, relationships, home life, and other areas can cause stress. And stress can cause erection problems. Work with your partner    · Don't assume that you know what your partner likes when it comes to sex. You may be wrong. Talk about what each of you does and does not enjoy.     · Make time outside of the bedroom to talk about your sex life.  If you avoid sex because you are afraid of having erection problems, your partner may worry that you are no longer interested.     · If you and your partner have trouble talking about sex, see a therapist. They may help you talk about it. Reading books with your partner about sexual health may also help.     · Relax. Take time for more foreplay. Worrying about your erections may only make things worse. Medicines    · Tell your doctor about all the medicines that you take. ? Some medicines can cause erection problems. ? Some medicines can have dangerous interactions with medicines that are prescribed for erection problems. These include over-the-counter medicines and herbal products.     · Be safe with medicines. Take your medicines exactly as prescribed. Call your doctor if you think you are having a problem with your medicine.     · Talk to your doctor about trying a medicine to help you keep an erection. This could be a medicine like sildenafil (such as Viagra), tadalafil (such as Cialis), or vardenafil (such as Levitra). If you have a heart problem, ask your doctor if these are safe for you. Do not take these medicines if you take nitroglycerin or other nitrate medicine. When should you call for help? Call your doctor now or seek immediate medical care if:    · You took a medicine for erection problems and you have an erection that lasts longer than 3 hours. Watch closely for changes in your health, and be sure to contact your doctor if you have any problems. Where can you learn more? Go to http://www.gray.com/  Enter J531 in the search box to learn more about \"Erection Problems: Care Instructions. \"  Current as of: February 11, 2020               Content Version: 12.6  © 6860-7823 [employer]. Care instructions adapted under license by [employer] (which disclaims liability or warranty for this information).  If you have questions about a medical condition or this instruction, always ask your healthcare professional. [address] any warranty or liability for your use of this information. Fatigue: Care Instructions  Your Care Instructions     Fatigue is a feeling of tiredness, exhaustion, or lack of energy. You may feel fatigue because of too much or not enough activity. It can also come from stress, lack of sleep, boredom, and poor diet. Many medical problems, such as viral infections, can cause fatigue. Emotional problems, especially depression, are often the cause of fatigue. Fatigue is most often a symptom of another problem. Treatment for fatigue depends on the cause. For example, if you have fatigue because you have a certain health problem, treating this problem also treats your fatigue. If depression or anxiety is the cause, treatment may help. Follow-up care is a key part of your treatment and safety. Be sure to make and go to all appointments, and call your doctor if you are having problems. It's also a good idea to know your test results and keep a list of the medicines you take. How can you care for yourself at home? · Get regular exercise. But don't overdo it. Go back and forth between rest and exercise. · Get plenty of rest.  · Eat a healthy diet. Do not skip meals, especially breakfast.  · Reduce your use of caffeine, tobacco, and alcohol. Caffeine is most often found in coffee, tea, cola drinks, and chocolate. · Limit medicines that can cause fatigue. This includes tranquilizers and cold and allergy medicines. When should you call for help? Watch closely for changes in your health, and be sure to contact your doctor if:    · You have new symptoms such as fever or a rash.     · Your fatigue gets worse.     · You have been feeling down, depressed, or hopeless. Or you may have lost interest in things that you usually enjoy.     · You are not getting better as expected. Where can you learn more?   Go to http://www.Alt[employer].com.com/  Enter [id number] in the search box to learn more about \"Fatigue: Care Instructions. \"  Current as of: June 26, 2019               Content Version: 12.6  © 9514-3653 [employer]. Care instructions adapted under license by [employer] (which disclaims liability or warranty for this information). If you have questions about a medical condition or this instruction, always ask your healthcare professional. [address] any warranty or liability for your use of this information. Learning About Hepatitis C  What is hepatitis C? Hepatitis C is a liver infection. It is caused by the hepatitis C virus. The virus is spread through infected blood and body fluids. Hepatitis C is often spread when a person shares infected needles used to inject illegal drugs. It also can be spread if a person uses a needle that has infected blood on it. This could happen when you get a tattoo or piercing. Or it can happen when you get a shot in some developing countries where they use needles more than once to give shots. In rare cases, a mother with hepatitis C can spread the virus to her baby at birth. Or a health care worker may accidentally be exposed to blood that is infected with hepatitis C. There is a very small risk of getting the virus through sexual contact. You can't get hepatitis C from casual contact. This is contact such as hugging, kissing, sneezing, coughing, and sharing food or drinks. What happens when you have hepatitis C? Some people who get hepatitis C have it for a short time and then get better. This is called acute hepatitis C. But most people get long-term, or chronic, hepatitis C. This can lead to liver damage as well as cirrhosis, liver cancer, and liver failure. Experts recommend that certain groups of people get tested for the virus.  These include people who have signs of liver disease or have ever shared needles while using illegal drugs. Ask your doctor if testing is right for you. You can also buy a home test called a Home Access Hepatitis C Check kit at [facility]. If the test shows that you have been exposed to the virus in the past, be sure to talk to your doctor to find out if you have the virus now. What are the symptoms? Most people who get hepatitis C do not have symptoms at first. Symptoms may include:  · Tiredness. · Headache. · Sore muscles. · Nausea. · Pain in the upper right belly. · Yellowing of your skin and eyes (jaundice). · Dark urine. How can you prevent hepatitis C? There is no vaccine to prevent the disease. Anyone who has hepatitis C can spread the virus to someone else. You can take steps to make infection less likely. · Do not share needles to inject drugs. · Follow safety guidelines if you work in a health care setting. Wear protective gloves and clothing. Dispose of needles and other sharp objects properly. · Make sure all instruments and supplies are sterilized if you get a tattoo, have your body pierced, or have acupuncture. To avoid spreading hepatitis C if you have it:  · Do not share needles or other equipment, such as cotton, spoons, and water, if you use needles to inject drugs. · Keep cuts, scrapes, and blisters covered. This will prevent others from coming in contact with your blood and other body fluids. Throw out any blood-soaked items such as used bandages. · Do not donate blood or sperm. · Wash your hands and anything that has come in contact with your blood. Use soap and water. · Do not share your toothbrush, razor, nail clippers, or anything else that might have your blood on it. · Use latex condoms during sex if you have HIV, multiple sex partners, or a sexually transmitted infection. How is hepatitis C treated? · If you have acute hepatitis C, your doctor will probably prescribe medicine.   · If you have chronic hepatitis C, your treatment depends on whether you have liver damage, other health problems you may have, and how much virus is in your body and what type it is. · You will need to see your doctor regularly to have blood tests to check your liver. Follow-up care is a key part of your treatment and safety. Be sure to make and go to all appointments, and call your doctor if you are having problems. It's also a good idea to know your test results and keep a list of the medicines you take. Where can you learn more? Go to http://www.gray.com/  Enter C666 in the search box to learn more about \"Learning About Hepatitis C.\"  Current as of: February 11, 2020               Content Version: 12.6  © 2006-2020 [employer]. Care instructions adapted under license by [employer] (which disclaims liability or warranty for this information). If you have questions about a medical condition or this instruction, always ask your healthcare professional. [address] any warranty or liability for your use of this information.

## 2020-12-08 LAB — TESTOST FREE SERPL-MCNC: 7.9 PG/ML (ref 6.6–18.1)

## 2020-12-08 NOTE — PROGRESS NOTES
Subjective:  Dhaval Andrew is a 72 y.o. male and presents to Discuss Test Results and Immunization/Injection with new complaints of having Erectile Dysfunction and Bilateral Hearing loss. Erectile dysfunction Review:  Patient complains of having difficulty in getting and maintaining an adequate erection. He states that he has not had sex in over 25 years because he has been incarcerated. Per the patient he recently attempted to have sex with 2 separate partners on 2 separate occasions but both attempts were unsuccessful. Hearing Loss  Patient is presenting with complaints of having bilateral hearing loss. Per the patient he is having difficulty hearing and finds himself constantly asking people to repeat things. He further reports that people are always asking him wh he is talking so loud. He denies having tinnitus or pain in his ears. No visible obstructions or abnormalities were visualized upon assessment. Review of Systems  Constitutional: negative for fevers, chills, anorexia and weight loss  Eyes:   negative for visual disturbance and irritation  ENT:   negative for tinnitus,sore throat,nasal congestion,ear pains. hoarseness  Respiratory:  negative for cough, hemoptysis, dyspnea,wheezing  CV:   negative for chest pain, palpitations, lower extremity edema  GI:   negative for nausea, vomiting, diarrhea, abdominal pain,melena  Endo:               negative for polyuria,polydipsia,polyphagia,heat intolerance  Genitourinary: negative for frequency, dysuria and hematuria  Integument:  negative for rash and pruritus  Hematologic:  negative for easy bruising and gum/nose bleeding  Musculoskel: negative for myalgias, arthralgias, back pain, muscle weakness, joint pain  Neurological:  negative for headaches, dizziness, vertigo, memory problems and gait   Behavl/Psych: negative for feelings of anxiety, depression, mood changes    Past Medical History:   Diagnosis Date    CAD (coronary artery disease)     Diabetes ([facility] [address]     GERD (gastroesophageal reflux disease)     Heart failure (HCC)     Hypertension      Past Surgical History:   Procedure Laterality Date    HX CORONARY ARTERY BYPASS GRAFT      [address] way     Social History     Socioeconomic History    Marital status:      Spouse name: Not on file    Number of children: Not on file    Years of education: Not on file    Highest education level: Not on file   Tobacco Use    Smoking status: Current Every Day Smoker     Packs/day: 0.50     Types: Cigarettes    Smokeless tobacco: Never Used   Substance and Sexual Activity    Alcohol use: No     Frequency: Never    Drug use: Not Currently    Sexual activity: Not Currently     No family history on file. Current Outpatient Medications   Medication Sig Dispense Refill    multivit-min/FA/lycopen/lutein (CENTRUM SILVER MEN PO) Take  by mouth.  albuterol (PROVENTIL HFA, VENTOLIN HFA, PROAIR HFA) 90 mcg/actuation inhaler Take 2 Puffs by inhalation every four (4) hours as needed for Wheezing for up to 360 days. 1 Inhaler 2    Insulin Needles, Disposable, (Comfort EZ Pen Needles) 32 gauge x 5/32\" ndle E11.6 use for insulin SQ injection as directed 100 Pen Needle 2    Insulin Syringe-Needle U-100 0.5 mL 30 gauge x 5/16\" syrg Use as directed  Syringe 3    insulin regular (NOVOLIN R, HUMULIN R) 100 unit/mL injection 10 Units by SubCUTAneous route two (2) times a day. 3 Vial 2    insulin glargine (Lantus Solostar U-100 Insulin) 100 unit/mL (3 mL) inpn 30 Units by SubCUTAneous route two (2) times a day. 6 Pen 1    isosorbide-hydrALAZINE (BiDiL) 20-37.5 mg per tablet Take 1 Tab by mouth three (3) times daily. 90 Tab 0    atorvastatin (LIPITOR) 40 mg tablet Take 1 Tab by mouth daily. 30 Tab 0    carvediloL (COREG) 25 mg tablet Take 1 Tab by mouth two (2) times daily (with meals). 60 Tab 0    hydroCHLOROthiazide (HYDRODIURIL) 25 mg tablet Take 1 Tab by mouth daily.  30 Tab 0    omeprazole (PRILOSEC) 40 mg capsule Take 1 Cap by mouth daily. 30 Cap 0    tamsulosin (FLOMAX) 0.4 mg capsule Take 1 Cap by mouth daily. 30 Cap 0    furosemide (LASIX) 40 mg tablet Take 1 Tab by mouth daily. 30 Tab 0    aspirin 81 mg chewable tablet Take 1 Tab by mouth daily. 90 Tab 1    amitriptyline (ELAVIL) 25 mg tablet Take 1 Tab by mouth nightly. 30 Tab 0    ergocalciferol (ERGOCALCIFEROL) 1,250 mcg (50,000 unit) capsule TK 1 C PO 1 TIME A WK      metFORMIN (GLUCOPHAGE) 1,000 mg tablet Take 1 Tab by mouth two (2) times daily (with meals). 60 Tab 0     No Known Allergies    Objective:  Visit Vitals  /72   Pulse 72   Temp 98.2 °F (36.8 °C) (Oral)   Resp 16   Ht 6' 1\" (1.854 m)   Wt 256 lb 4.8 oz (116.3 kg)   SpO2 96%   BMI 33.81 kg/m²     Physical Exam:   General appearance - alert, well appearing, and in no distress  Mental status - alert, oriented to person, place, and time  EYE-JUAN, EOMI, corneas normal, no foreign bodies  ENT-ENT exam normal, no neck nodes or sinus tenderness  Nose - normal and patent, no erythema, discharge or polyps  Mouth - mucous membranes moist, pharynx normal without lesions  Neck - supple, no significant adenopathy   Chest - clear to auscultation, no wheezes, rales or rhonchi, symmetric air entry   Heart - normal rate, regular rhythm, normal S1, S2, no murmurs, rubs, clicks or gallops   Abdomen - soft, nontender, nondistended, no masses or organomegaly  Lymph- no adenopathy palpable  Ext-peripheral pulses normal, no pedal edema, no clubbing or cyanosis  Skin-Warm and dry.  no hyperpigmentation, vitiligo, or suspicious lesions  Neuro -alert, oriented, normal speech, no focal findings or movement disorder noted  Neck-normal C-spine, no tenderness, full ROM without pain  Feet-no nail deformities or callus formation with good pulses noted      Results for orders placed or performed in visit on 12/07/20   SAMPLES BEING HELD   Result Value Ref Range    SAMPLES BEING HELD 1sst     COMMENT Add-on orders for these samples will be processed based on acceptable specimen integrity and analyte stability, which may vary by analyte. Assessment/Plan:    ICD-10-CM ICD-9-CM    1. Encounter to discuss test results  Z71.2 V65.49    2. Positive hepatitis C antibody test  R76.8 795.79 HCV RNA JONATHAN QUALITATIVE      HCV RNA JONATHAN QUALITATIVE   3. Erectile dysfunction, unspecified erectile dysfunction type  N52.9 607.84 TESTOSTERONE, FREE      TESTOSTERONE, FREE   4. Bilateral hearing loss, unspecified hearing loss type  H91.93 389.9 REFERRAL TO ENT-OTOLARYNGOLOGY   5. Fatigue, unspecified type  R53.83 780.79 VITAMIN D, 25 HYDROXY      TESTOSTERONE, FREE      TESTOSTERONE, FREE      VITAMIN D, 25 HYDROXY   6. Encounter for immunization  Z23 V03.89 PNEUMOCOCCAL POLYSACCHARIDE VACCINE, 23-VALENT, ADULT OR IMMUNOSUPPRESSED PT DOSE,      ADMIN PNEUMOCOCCAL VACCINE   7.  Need for prophylactic vaccination against Streptococcus pneumoniae (pneumococcus) and influenza  Z23 V06.6 PNEUMOCOCCAL POLYSACCHARIDE VACCINE, 23-VALENT, ADULT OR IMMUNOSUPPRESSED PT DOSE,      ADMIN PNEUMOCOCCAL VACCINE     Orders Placed This Encounter    Pneumococcal Polysaccharide Vaccine (PPSV-23)    VITAMIN D, 25 HYDROXY     Standing Status:   Future     Number of Occurrences:   1     Standing Expiration Date:   12/7/2021    TESTOSTERONE, FREE     Standing Status:   Future     Number of Occurrences:   1     Standing Expiration Date:   12/7/2021    HCV RNA JONATHAN QUALITATIVE     Standing Status:   Future     Number of Occurrences:   1     Standing Expiration Date:   12/7/2021    SAMPLES BEING HELD     ATLASSITEID:[id number]   Jamia Garcia ENT-Otolaryngology [facility]     Referral Priority:   Routine     Referral Type:   Consultation     Referral Reason:   Specialty Services Required     Referred to Provider:   Kristy Adhikari MD     Number of Visits Requested:   1    Pneumococcal Admin ([place]) ([address] [facility])    multivit-min/FA/lycopen/lutein (CENTRUM SILVER MEN PO)     Sig: Take  by mouth. Patient Instructions          Erection Problems: Care Instructions  Overview     A man has erection problems when he routinely can't get or keep an erection that allows satisfactory sex. He may not be able to have an erection at any time. Or he may not be able to have one that is firm enough or lasts long enough to complete intercourse. The problem is also called erectile dysfunction (ED). It's not the same as having trouble getting an erection now and then. That's common. It happens to most men at some time. Erection problems can be caused by problems with the blood vessels, nerves, or hormones. They can be caused by diabetes, heart disease, and injuries. Nerve problems also can cause them. Medicines, alcohol, and tobacco also can cause problems. So can depression, stress, grief, or relationship problems. Follow-up care is a key part of your treatment and safety. Be sure to make and go to all appointments, and call your doctor if you are having problems. It's also a good idea to know your test results and keep a list of the medicines you take. How can you care for yourself at home? Lifestyle    · Limit alcohol. Have no more than 2 drinks a day.     · Do not smoke. Smoking makes it harder for the blood vessels in the penis to relax and let blood flow in. If you need help quitting, talk to your doctor about stop-smoking programs and medicines. These can increase your chances of quitting for good.     · Do not use cocaine, heroin, or other illegal drugs.     · Try to reduce stress.     · Give yourself time to adjust to change. Changes in your job, family, relationships, home life, and other areas can cause stress. And stress can cause erection problems. Work with your partner    · Don't assume that you know what your partner likes when it comes to sex. You may be wrong.  Talk about what each of you does and does not enjoy.     · Make time outside of the bedroom to talk about your sex life. If you avoid sex because you are afraid of having erection problems, your partner may worry that you are no longer interested.     · If you and your partner have trouble talking about sex, see a therapist. They may help you talk about it. Reading books with your partner about sexual health may also help.     · Relax. Take time for more foreplay. Worrying about your erections may only make things worse. Medicines    · Tell your doctor about all the medicines that you take. ? Some medicines can cause erection problems. ? Some medicines can have dangerous interactions with medicines that are prescribed for erection problems. These include over-the-counter medicines and herbal products.     · Be safe with medicines. Take your medicines exactly as prescribed. Call your doctor if you think you are having a problem with your medicine.     · Talk to your doctor about trying a medicine to help you keep an erection. This could be a medicine like sildenafil (such as Viagra), tadalafil (such as Cialis), or vardenafil (such as Levitra). If you have a heart problem, ask your doctor if these are safe for you. Do not take these medicines if you take nitroglycerin or other nitrate medicine. When should you call for help? Call your doctor now or seek immediate medical care if:    · You took a medicine for erection problems and you have an erection that lasts longer than 3 hours. Watch closely for changes in your health, and be sure to contact your doctor if you have any problems. Where can you learn more? Go to http://www.gray.com/  Enter J531 in the search box to learn more about \"Erection Problems: Care Instructions. \"  Current as of: February 11, 2020               Content Version: 12.6  © 2054-3519 [employer].    Care instructions adapted under license by [employer] (which disclaims liability or warranty for this information). If you have questions about a medical condition or this instruction, always ask your healthcare professional. [address] any warranty or liability for your use of this information. Fatigue: Care Instructions  Your Care Instructions     Fatigue is a feeling of tiredness, exhaustion, or lack of energy. You may feel fatigue because of too much or not enough activity. It can also come from stress, lack of sleep, boredom, and poor diet. Many medical problems, such as viral infections, can cause fatigue. Emotional problems, especially depression, are often the cause of fatigue. Fatigue is most often a symptom of another problem. Treatment for fatigue depends on the cause. For example, if you have fatigue because you have a certain health problem, treating this problem also treats your fatigue. If depression or anxiety is the cause, treatment may help. Follow-up care is a key part of your treatment and safety. Be sure to make and go to all appointments, and call your doctor if you are having problems. It's also a good idea to know your test results and keep a list of the medicines you take. How can you care for yourself at home? · Get regular exercise. But don't overdo it. Go back and forth between rest and exercise. · Get plenty of rest.  · Eat a healthy diet. Do not skip meals, especially breakfast.  · Reduce your use of caffeine, tobacco, and alcohol. Caffeine is most often found in coffee, tea, cola drinks, and chocolate. · Limit medicines that can cause fatigue. This includes tranquilizers and cold and allergy medicines. When should you call for help? Watch closely for changes in your health, and be sure to contact your doctor if:    · You have new symptoms such as fever or a rash.     · Your fatigue gets worse.     · You have been feeling down, depressed, or hopeless.  Or you may have lost interest in things that you usually enjoy.     · You are not getting better as expected. Where can you learn more? Go to http://www.gray.com/  Enter [id number] in the search box to learn more about \"Fatigue: Care Instructions. \"  Current as of: June 26, 2019               Content Version: 12.6  © 6159-2209 [employer]. Care instructions adapted under license by [employer] (which disclaims liability or warranty for this information). If you have questions about a medical condition or this instruction, always ask your healthcare professional. [place] [address] any warranty or liability for your use of this information. Learning About Hepatitis C  What is hepatitis C? Hepatitis C is a liver infection. It is caused by the hepatitis C virus. The virus is spread through infected blood and body fluids. Hepatitis C is often spread when a person shares infected needles used to inject illegal drugs. It also can be spread if a person uses a needle that has infected blood on it. This could happen when you get a tattoo or piercing. Or it can happen when you get a shot in some developing countries where they use needles more than once to give shots. In rare cases, a mother with hepatitis C can spread the virus to her baby at birth. Or a health care worker may accidentally be exposed to blood that is infected with hepatitis C. There is a very small risk of getting the virus through sexual contact. You can't get hepatitis C from casual contact. This is contact such as hugging, kissing, sneezing, coughing, and sharing food or drinks. What happens when you have hepatitis C? Some people who get hepatitis C have it for a short time and then get better. This is called acute hepatitis C. But most people get long-term, or chronic, hepatitis C. This can lead to liver damage as well as cirrhosis, liver cancer, and liver failure. Experts recommend that certain groups of people get tested for the virus.  These include people who have signs of liver disease or have ever shared needles while using illegal drugs. Ask your doctor if testing is right for you. You can also buy a home test called a Home Access Hepatitis C Check kit [facility]. If the test shows that you have been exposed to the virus in the past, be sure to talk to your doctor to find out if you have the virus now. What are the symptoms? Most people who get hepatitis C do not have symptoms at first. Symptoms may include:  · Tiredness. · Headache. · Sore muscles. · Nausea. · Pain in the upper right belly. · Yellowing of your skin and eyes (jaundice). · Dark urine. How can you prevent hepatitis C? There is no vaccine to prevent the disease. Anyone who has hepatitis C can spread the virus to someone else. You can take steps to make infection less likely. · Do not share needles to inject drugs. · Follow safety guidelines if you work in a health care setting. Wear protective gloves and clothing. Dispose of needles and other sharp objects properly. · Make sure all instruments and supplies are sterilized if you get a tattoo, have your body pierced, or have acupuncture. To avoid spreading hepatitis C if you have it:  · Do not share needles or other equipment, such as cotton, spoons, and water, if you use needles to inject drugs. · Keep cuts, scrapes, and blisters covered. This will prevent others from coming in contact with your blood and other body fluids. Throw out any blood-soaked items such as used bandages. · Do not donate blood or sperm. · Wash your hands and anything that has come in contact with your blood. Use soap and water. · Do not share your toothbrush, razor, nail clippers, or anything else that might have your blood on it. · Use latex condoms during sex if you have HIV, multiple sex partners, or a sexually transmitted infection. How is hepatitis C treated?   · If you have acute hepatitis C, your doctor will probably prescribe medicine. · If you have chronic hepatitis C, your treatment depends on whether you have liver damage, other health problems you may have, and how much virus is in your body and what type it is. · You will need to see your doctor regularly to have blood tests to check your liver. Follow-up care is a key part of your treatment and safety. Be sure to make and go to all appointments, and call your doctor if you are having problems. It's also a good idea to know your test results and keep a list of the medicines you take. Where can you learn more? Go to http://www.gray.com/  Enter C666 in the search box to learn more about \"Learning About Hepatitis C.\"  Current as of: February 11, 2020               Content Version: 12.6  © 2006-2020 [employer]. Care instructions adapted under license by [employer] (which disclaims liability or warranty for this information). If you have questions about a medical condition or this instruction, always ask your healthcare professional. [place] [address] any warranty or liability for your use of this information. Follow-up and Dispositions    · Return in about 1 month (around 1/7/2021), or if symptoms worsen or fail to improve, for Hypertension, Erectile Dysfunction and Hep C+. I have reviewed with the patient details of the assessment and plan and all questions were answered. Relevent patient education was performed. The most recent lab findings were reviewed with the patient. An After Visit Summary was printed and given to the patient.

## 2020-12-09 LAB
25(OH)D3 SERPL-MCNC: 33.9 NG/ML (ref 30–100)
HCV RNA SERPL QL NAA+PROBE: POSITIVE

## 2020-12-11 ENCOUNTER — TELEPHONE (OUTPATIENT)
Dept: INTERNAL MEDICINE CLINIC | Age: 70
End: 2020-12-11

## 2020-12-11 NOTE — TELEPHONE ENCOUNTER
PCP called patient to discuss test results Hep C+), patient wanting to schedule an appointment to discuss results face to face. Warm transfer was made to  to assist patient with scheduling an appointment.

## 2020-12-31 ENCOUNTER — TELEPHONE (OUTPATIENT)
Dept: HEMATOLOGY | Age: 70
End: 2020-12-31

## 2020-12-31 NOTE — TELEPHONE ENCOUNTER
Two pt identifiers confirmed. Pt offered and accepted appt for down below:      Future Appointments   Date Time Provider Travis Sabrina   1/22/2021  3:00 PM Shey Mendes, NP [facility]       Pt verbalized understanding of information discussed w/ no further questions at this time.

## 2021-01-28 ENCOUNTER — OFFICE VISIT (OUTPATIENT)
Dept: INTERNAL MEDICINE CLINIC | Age: 71
End: 2021-01-28
Payer: COMMERCIAL

## 2021-01-28 VITALS
RESPIRATION RATE: 16 BRPM | HEIGHT: 73 IN | TEMPERATURE: 98.7 F | WEIGHT: 248 LBS | SYSTOLIC BLOOD PRESSURE: 110 MMHG | BODY MASS INDEX: 32.87 KG/M2 | HEART RATE: 70 BPM | DIASTOLIC BLOOD PRESSURE: 70 MMHG | OXYGEN SATURATION: 94 %

## 2021-01-28 DIAGNOSIS — Z74.1 DEPENDENT FOR MEDICATION ADMINISTRATION: ICD-10-CM

## 2021-01-28 DIAGNOSIS — Z74.2 NEED FOR HOME HEALTH CARE: Primary | ICD-10-CM

## 2021-01-28 DIAGNOSIS — R35.1 NOCTURIA: ICD-10-CM

## 2021-01-28 DIAGNOSIS — Z87.09 HISTORY OF ASTHMA: ICD-10-CM

## 2021-01-28 DIAGNOSIS — N52.9 ERECTILE DYSFUNCTION, UNSPECIFIED ERECTILE DYSFUNCTION TYPE: ICD-10-CM

## 2021-01-28 DIAGNOSIS — E11.8 TYPE II DIABETES MELLITUS WITH COMPLICATION (HCC): ICD-10-CM

## 2021-01-28 DIAGNOSIS — I25.119 CORONARY ARTERY DISEASE WITH ANGINA PECTORIS, UNSPECIFIED VESSEL OR LESION TYPE, UNSPECIFIED WHETHER NATIVE OR TRANSPLANTED HEART (HCC): ICD-10-CM

## 2021-01-28 DIAGNOSIS — E11.51 DIABETIC PERIPHERAL VASCULAR DISORDER (HCC): ICD-10-CM

## 2021-01-28 DIAGNOSIS — I10 ESSENTIAL HYPERTENSION: ICD-10-CM

## 2021-01-28 PROCEDURE — 99214 OFFICE O/P EST MOD 30 MIN: CPT | Performed by: NURSE PRACTITIONER

## 2021-01-28 NOTE — PATIENT INSTRUCTIONS
Coronary Artery Disease: Care Instructions  Your Care Instructions     The heart is a muscle, and like any muscle, it needs blood to work well. Coronary artery disease occurs when the arteries that bring oxygen-rich blood to your heart have a buildup of plaque--deposits of fats and other substances. Plaque can reduce blood flow to the heart muscle. This can cause angina symptoms such as chest pain or pressure. A heart attack can happen if blood flow is completely blocked. You can do a lot to improve your health and prevent a heart attack. Eating healthy food, not smoking, getting regular exercise, and taking your medicine are the main things you can do every day to stay healthy. Follow-up care is a key part of your treatment and safety. Be sure to make and go to all appointments, and call your doctor if you are having problems. It's also a good idea to know your test results and keep a list of the medicines you take. How can you care for yourself at home? Medicines    · Be safe with medicines. Take your medicines exactly as prescribed. Call your doctor if you think you are having a problem with your medicine. You will get more details on the specific medicines your doctor prescribes.     · You will take medicines that lower your risk of a heart attack and lower your risk of dying early from heart disease. These medicines include:  ? Angiotensin-converting enzyme (ACE) inhibitors or angiotensin II receptor blockers (ARBs). They lower blood pressure. ? Aspirin and other blood thinners. They prevent blood clots that could cause a heart attack. ? Beta-blockers. They lower the heart's workload. ? Statins and other cholesterol medicines. They lower cholesterol.     · If your doctor has given you nitroglycerin for angina symptoms (such as chest pain or pressure) keep it with you at all times. If you have symptoms, sit down and rest, and take the first dose of nitroglycerin as directed.  If your symptoms get worse or are not getting better within 5 minutes, call 911 right away. Stay on the phone. The emergency  will give you further instructions.     · Do not take any over-the-counter medicines, vitamins, or herbal products without talking to your doctor first.   Lifestyle  Ask your doctor if a cardiac rehab program is right for you. Cardiac rehab can help you make lifestyle changes. In cardiac rehab, a team of health professionals provides education and support to help you make new, healthy habits.    · Do not smoke. Avoid secondhand smoke too. Smoking can increase your risk of a heart attack or stroke. If you need help quitting, talk to your doctor about stop-smoking programs and medicines. These can increase your chances of quitting for good.     · Eat heart-healthy foods. These include vegetables, fruits, nuts, beans, lean meat, fish, and whole grains. Limit saturated fat, sodium, and alcohol. Limit drinks and foods with added sugar.     · If your doctor recommends it, get more exercise. Ask your doctor what level of exercise is safe for you. Walking is a good choice. Bit by bit, increase the amount you walk every day. Try for at least 30 minutes on most days of the week. You also may want to swim, bike, or do other activities.     · Stay at a healthy weight. Lose weight if you need to.     · Manage other health problems. These include diabetes, high blood pressure, and high cholesterol. If you think you may have a problem with alcohol or drug use, talk to your doctor.     · If you have angina symptoms, pay attention to your symptoms. This can help you see what causes them and what is typical for you.     · Avoid colds and flu. Get a pneumococcal vaccine shot. If you have had one before, ask your doctor whether you need another dose. Get a flu vaccine every year. If you must be around people with colds or flu, wash your hands often.     · If you think you have symptoms of depression, talk to your doctor. Symptoms include feeling sad or hopeless most of the time, or losing interest in activities that used to make you happy. When should you call for help? Call 911 anytime you think you may need emergency care. For example, call if:    · You have symptoms of a heart attack. These may include:  ? Chest pain or pressure, or a strange feeling in the chest.  ? Sweating. ? Shortness of breath. ? Nausea or vomiting. ? Pain, pressure, or a strange feeling in the back, neck, jaw, or upper belly or in one or both shoulders or arms. ? Lightheadedness or sudden weakness. ? A fast or irregular heartbeat. After you call 911, the  may tell you to chew 1 adult-strength or 2 to 4 low-dose aspirin. Wait for an ambulance. Do not try to drive yourself.     · You have angina symptoms (such as chest pain or pressure) that do not go away with rest or are not getting better within 5 minutes after you take a dose of nitroglycerin.     · You passed out (lost consciousness). Call your doctor now or seek immediate medical care if:    · You are having angina symptoms, such as chest pain or pressure, more often than usual, or they are different or worse than usual.     · You have new or increased shortness of breath.     · You are dizzy or lightheaded, or you feel like you may faint. Watch closely for changes in your health, and be sure to contact your doctor if you have any problems. Where can you learn more? Go to http://www.gray.com/  Enter X188 in the search box to learn more about \"Coronary Artery Disease: Care Instructions. \"  Current as of: December 16, 2019               Content Version: 12.6  © 9105-9812 [employer]. Care instructions adapted under license by [employer] (which disclaims liability or warranty for this information).  If you have questions about a medical condition or this instruction, always ask your healthcare professional. Samul Nissen disclaims any warranty or liability for your use of this information. Counting Carbohydrates: Care Instructions  Your Care Instructions     You don't have to eat special foods when you have diabetes. You just have to be careful to eat healthy foods. Carbohydrates (carbs) raise blood sugar higher and quicker than any other nutrient. Carbs are found in desserts, breads and cereals, and fruit. They're also in starchy vegetables. These include potatoes, corn, and grains such as rice and pasta. Carbs are also in milk and yogurt. The more carbs you eat at one time, the higher your blood sugar will rise. Spreading carbs all through the day helps keep your blood sugar levels within your target range. Counting carbs is one of the best ways to keep your blood sugar under control. If you use insulin, counting carbs helps you match the right amount of insulin to the number of grams of carbs in a meal. Then you can change your diet and insulin dose as needed. Testing your blood sugar several times a day can help you learn how carbs affect your blood sugar. A registered dietitian or certified diabetes educator can help you plan meals and snacks. Follow-up care is a key part of your treatment and safety. Be sure to make and go to all appointments, and call your doctor if you are having problems. It's also a good idea to know your test results and keep a list of the medicines you take. How can you care for yourself at home? Know your daily amount of carbohydrates  Your daily amount depends on several things, such as your weight, how active you are, which diabetes medicines you take, and what your goals are for your blood sugar levels. A registered dietitian or certified diabetes educator can help you plan how many carbs to include in each meal and snack. For most adults, a guideline for the daily amount of carbs is:  · 45 to 60 grams at each meal. That's about the same as 3 to 4 carbohydrate servings.   · 15 to 20 grams at each snack. That's about the same as 1 carbohydrate serving. Count carbs  Counting carbs lets you know how much rapid-acting insulin to take before you eat. If you use an insulin pump, you get a constant rate of insulin during the day. So the pump must be programmed at meals. This gives you extra insulin to cover the rise in blood sugar after meals. If you take insulin:  · Learn your own insulin-to-carb ratio. You and your diabetes health professional will figure out the ratio. You can do this by testing your blood sugar after meals. For example, you may need a certain amount of insulin for every 15 grams of carbs. · Add up the carb grams in a meal. Then you can figure out how many units of insulin to take based on your insulin-to-carb ratio. · Exercise lowers blood sugar. You can use less insulin than you would if you were not doing exercise. Keep in mind that timing matters. If you exercise within 1 hour after a meal, your body may need less insulin for that meal than it would if you exercised 3 hours after the meal. Test your blood sugar to find out how exercise affects your need for insulin. If you do or don't take insulin:  · Look at labels on packaged foods. This can tell you how many carbs are in a serving. You can also use guides from the American Diabetes Association. · Be aware of portions, or serving sizes. If a package has two servings and you eat the whole package, you need to double the number of grams of carbohydrate listed for one serving. · Protein, fat, and fiber do not raise blood sugar as much as carbs do. If you eat a lot of these nutrients in a meal, your blood sugar will rise more slowly than it would otherwise. Eat from all food groups  · Eat at least three meals a day. · Plan meals to include food from all the food groups. The food groups include grains, fruits, dairy, proteins, and vegetables.   · Talk to your dietitian or diabetes educator about ways to add limited amounts of sweets into your meal plan. · If you drink alcohol, talk to your doctor. It may not be recommended when you are taking certain diabetes medicines. Where can you learn more? Go to http://www.gray.com/  Enter G703 in the search box to learn more about \"Counting Carbohydrates: Care Instructions. \"  Current as of: December 20, 2019               Content Version: 12.6  © 2006-2020 [employer]. Care instructions adapted under license by [employer] (which disclaims liability or warranty for this information). If you have questions about a medical condition or this instruction, always ask your healthcare professional. [address] any warranty or liability for your use of this information. High Blood Pressure: Care Instructions  Overview     It's normal for blood pressure to go up and down throughout the day. But if it stays up, you have high blood pressure. Another name for high blood pressure is hypertension. Despite what a lot of people think, high blood pressure usually doesn't cause headaches or make you feel dizzy or lightheaded. It usually has no symptoms. But it does increase your risk of stroke, heart attack, and other problems. You and your doctor will talk about your risks of these problems based on your blood pressure. Your doctor will give you a goal for your blood pressure. Your goal will be based on your health and your age. Lifestyle changes, such as eating healthy and being active, are always important to help lower blood pressure. You might also take medicine to reach your blood pressure goal.  Follow-up care is a key part of your treatment and safety. Be sure to make and go to all appointments, and call your doctor if you are having problems. It's also a good idea to know your test results and keep a list of the medicines you take. How can you care for yourself at home?   Medical treatment  · If you stop taking your medicine, your blood pressure will go back up. You may take one or more types of medicine to lower your blood pressure. Be safe with medicines. Take your medicine exactly as prescribed. Call your doctor if you think you are having a problem with your medicine. · Talk to your doctor before you start taking aspirin every day. Aspirin can help certain people lower their risk of a heart attack or stroke. But taking aspirin isn't right for everyone, because it can cause serious bleeding. · See your doctor regularly. You may need to see the doctor more often at first or until your blood pressure comes down. · If you are taking blood pressure medicine, talk to your doctor before you take decongestants or anti-inflammatory medicine, such as ibuprofen. Some of these medicines can raise blood pressure. · Learn how to check your blood pressure at home. Lifestyle changes  · Stay at a healthy weight. This is especially important if you put on weight around the waist. Losing even 10 pounds can help you lower your blood pressure. · If your doctor recommends it, get more exercise. Walking is a good choice. Bit by bit, increase the amount you walk every day. Try for at least 30 minutes on most days of the week. You also may want to swim, bike, or do other activities. · Avoid or limit alcohol. Talk to your doctor about whether you can drink any alcohol. · Try to limit how much sodium you eat to less than 2,300 milligrams (mg) a day. Your doctor may ask you to try to eat less than 1,500 mg a day. · Eat plenty of fruits (such as bananas and oranges), vegetables, legumes, whole grains, and low-fat dairy products. · Lower the amount of saturated fat in your diet. Saturated fat is found in animal products such as milk, cheese, and meat. Limiting these foods may help you lose weight and also lower your risk for heart disease. · Do not smoke. Smoking increases your risk for heart attack and stroke.  If you need help quitting, talk to your doctor about stop-smoking programs and medicines. These can increase your chances of quitting for good. When should you call for help? Call  911 anytime you think you may need emergency care. This may mean having symptoms that suggest that your blood pressure is causing a serious heart or blood vessel problem. Your blood pressure may be over 180/120. For example, call 911 if:    · You have symptoms of a heart attack. These may include:  ? Chest pain or pressure, or a strange feeling in the chest.  ? Sweating. ? Shortness of breath. ? Nausea or vomiting. ? Pain, pressure, or a strange feeling in the back, neck, jaw, or upper belly or in one or both shoulders or arms. ? Lightheadedness or sudden weakness. ? A fast or irregular heartbeat.     · You have symptoms of a stroke. These may include:  ? Sudden numbness, tingling, weakness, or loss of movement in your face, arm, or leg, especially on only one side of your body. ? Sudden vision changes. ? Sudden trouble speaking. ? Sudden confusion or trouble understanding simple statements. ? Sudden problems with walking or balance. ? A sudden, severe headache that is different from past headaches.     · You have severe back or belly pain. Do not wait until your blood pressure comes down on its own. Get help right away. Call your doctor now or seek immediate care if:    · Your blood pressure is much higher than normal (such as 180/120 or higher), but you don't have symptoms.     · You think high blood pressure is causing symptoms, such as:  ? Severe headache.  ? Blurry vision. Watch closely for changes in your health, and be sure to contact your doctor if:    · Your blood pressure measures higher than your doctor recommends at least 2 times. That means the top number is higher or the bottom number is higher, or both.     · You think you may be having side effects from your blood pressure medicine. Where can you learn more?   Go to http://www.gray.com/  Enter [id number] in the search box to learn more about \"High Blood Pressure: Care Instructions. \"  Current as of: December 16, 2019               Content Version: 12.6  © 2006-2020 [employer]. Care instructions adapted under license by [employer] (which disclaims liability or warranty for this information). If you have questions about a medical condition or this instruction, always ask your healthcare professional. [address] any warranty or liability for your use of this information. Erection Problems: Care Instructions  Overview     A man has erection problems when he routinely can't get or keep an erection that allows satisfactory sex. He may not be able to have an erection at any time. Or he may not be able to have one that is firm enough or lasts long enough to complete intercourse. The problem is also called erectile dysfunction (ED). It's not the same as having trouble getting an erection now and then. That's common. It happens to most men at some time. Erection problems can be caused by problems with the blood vessels, nerves, or hormones. They can be caused by diabetes, heart disease, and injuries. Nerve problems also can cause them. Medicines, alcohol, and tobacco also can cause problems. So can depression, stress, grief, or relationship problems. Follow-up care is a key part of your treatment and safety. Be sure to make and go to all appointments, and call your doctor if you are having problems. It's also a good idea to know your test results and keep a list of the medicines you take. How can you care for yourself at home? Lifestyle    · Limit alcohol. Have no more than 2 drinks a day.     · Do not smoke. Smoking makes it harder for the blood vessels in the penis to relax and let blood flow in. If you need help quitting, talk to your doctor about stop-smoking programs and medicines.  These can increase your chances of quitting for good.     · Do not use cocaine, heroin, or other illegal drugs.     · Try to reduce stress.     · Give yourself time to adjust to change. Changes in your job, family, relationships, home life, and other areas can cause stress. And stress can cause erection problems. Work with your partner    · Don't assume that you know what your partner likes when it comes to sex. You may be wrong. Talk about what each of you does and does not enjoy.     · Make time outside of the bedroom to talk about your sex life. If you avoid sex because you are afraid of having erection problems, your partner may worry that you are no longer interested.     · If you and your partner have trouble talking about sex, see a therapist. They may help you talk about it. Reading books with your partner about sexual health may also help.     · Relax. Take time for more foreplay. Worrying about your erections may only make things worse. Medicines    · Tell your doctor about all the medicines that you take. ? Some medicines can cause erection problems. ? Some medicines can have dangerous interactions with medicines that are prescribed for erection problems. These include over-the-counter medicines and herbal products.     · Be safe with medicines. Take your medicines exactly as prescribed. Call your doctor if you think you are having a problem with your medicine.     · Talk to your doctor about trying a medicine to help you keep an erection. This could be a medicine like sildenafil (such as Viagra), tadalafil (such as Cialis), or vardenafil (such as Levitra). If you have a heart problem, ask your doctor if these are safe for you. Do not take these medicines if you take nitroglycerin or other nitrate medicine. When should you call for help? Call your doctor now or seek immediate medical care if:    · You took a medicine for erection problems and you have an erection that lasts longer than 3 hours.    Watch closely for changes in your health, and be sure to contact your doctor if you have any problems. Where can you learn more? Go to http://www.gray.com/  Enter J531 in the search box to learn more about \"Erection Problems: Care Instructions. \"  Current as of: February 11, 2020               Content Version: 12.6  © 8342-8356 [employer]. Care instructions adapted under license by [employer] (which disclaims liability or warranty for this information). If you have questions about a medical condition or this instruction, always ask your healthcare professional. [address] any warranty or liability for your use of this information.

## 2021-01-28 NOTE — PROGRESS NOTES
1. Have you been to the ER, urgent care clinic since your last visit? Hospitalized since your last visit?no    2. Have you seen or consulted any other health care providers outside of the [address] [place] [address] since your last visit? Include any pap smears or colon screening.  No    Chief Complaint   Patient presents with    Referral Request     caretaker

## 2021-01-29 NOTE — PROGRESS NOTES
Subjective:  Noreen Verde is a 72 y.o. male and presents with Hypertension, Hyperlipidemia, DM II, CAD and Asthma for [facility] Referral Request. He stated that he is unable to read or write and is having difficulty with taking his medications correctly. He is requesting a Caretaker to assist with medication administration and with performing ADL's. He had further complaints of having Erectile Dysfunction and is requesting Viagra for management. Patient with CAD and angina was advised of risk which nay occur when taking Viagra with Angina medications. He was referred to MD Trudy Barrera (Urologist) to be evaluated for safe medication options for management of ED. .  Subjective:  Noreen Verde is a 72 y.o. male and presents with Hypertension, DM II, Hyperlipidemia, & Asthma to  Establish Care  Per the patient he has been incarcerated for over 25 years and wants to establish care to improve his overall health. Hypertension Review:  The patient has essential hypertension which is now well managed, 110/70 at today's visit. Diet and Lifestyle: generally follows a  low sodium diet, exercises sporadically  Home BP Monitoring: is not measured at home. Pertinent ROS: taking medications as instructed, no medication side effects noted, no TIA's, no chest pain on exertion, no dyspnea on exertion, no swelling of ankles.      Diabetes Mellitus Review:  Patient is presenting for evaluation and follow up for Diabetes Mellitus Type II.    Diabetic ROS - medication compliance: compliant most of the time, diabetic diet compliance: compliant most of the time,   Known diabetic complications: none  Cardiovascular risk factors: family history, dyslipidemia, diabetes mellitus, obesity, hypertension  Current diabetic medications include: Metformin and  insulin   Eye exam current (within one year): no  Weight trend: stable  Prior visit with dietician: no  Current diet: \"healthy\" diet  in general  Current exercise: walking  Any episodes of hypoglycemia? No     Hyperlipidemia Review:  Patient presents for follow up and evaluation of lipids. A repeat fasting lipid profile was done at today's office visit. Patient claims that he is taking medications as prescribed and is adhering to dietary recommendations. Patient states that he does exercise but not regularly. He was advised of exercise recommendations of 150 minutes a week. The patient does not use medications that may worsen dyslipidemias (corticosteroids, progestins, anabolic steroids, amiodarone, cyclosporine, olanzapine).      Asthma Review:  The patient is being seen for follow up of asthma,  Not currently in any distress, (nighttime wheezing has been reported)   Asthma symptoms have occured infrequently. Wheezing when present is described as mild and easily relieved with rescue bronchodilator. The patient does report use of a steroid inhaler but admits that he has not had his inhaler recently. Frequency of use of quick-relief meds: infrequent   Regimen compliance: The patient reports adherence to this regimen. Review of Systems  Constitutional: negative for fevers, chills, anorexia and weight loss  Eyes:   negative for visual disturbance and irritation  ENT:   negative for tinnitus,sore throat,nasal congestion,ear pains. hoarseness  Respiratory:  negative for cough, hemoptysis, dyspnea,wheezing  CV:   negative for chest pain, palpitations, lower extremity edema  GI:   negative for nausea, vomiting, diarrhea, abdominal pain,melena  Endo:               negative for polyuria,polydipsia,polyphagia,heat intolerance  Genitourinary: negative for frequency, dysuria and hematuria  Integument:  negative for rash and pruritus  Hematologic:  negative for easy bruising and gum/nose bleeding  Musculoskel: negative for myalgias, arthralgias, back pain, muscle weakness, joint pain  Neurological:  negative for headaches, dizziness, vertigo, memory problems and gait   Behavl/Psych: negative for feelings of anxiety, depression, mood changes    Past Medical History:   Diagnosis Date    CAD (coronary artery disease)     Diabetes ([address]     GERD (gastroesophageal reflux disease)     Heart failure (HCC)     Hypertension      Past Surgical History:   Procedure Laterality Date    HX CORONARY ARTERY BYPASS GRAFT      [address]     Social History     Socioeconomic History    Marital status:      Spouse name: Not on file    Number of children: Not on file    Years of education: Not on file    Highest education level: Not on file   Tobacco Use    Smoking status: Current Every Day Smoker     Packs/day: 0.50     Types: Cigarettes    Smokeless tobacco: Never Used   Substance and Sexual Activity    Alcohol use: No     Frequency: Never    Drug use: Not Currently    Sexual activity: Not Currently     History reviewed. No pertinent family history. Current Outpatient Medications   Medication Sig Dispense Refill    albuterol (PROVENTIL HFA, VENTOLIN HFA, PROAIR HFA) 90 mcg/actuation inhaler Take 2 Puffs by inhalation every four (4) hours as needed for Wheezing for up to 360 days. 1 Inhaler 2    Insulin Needles, Disposable, (Comfort EZ Pen Needles) 32 gauge x 5/32\" ndle E11.6 use for insulin SQ injection as directed 100 Pen Needle 2    Insulin Syringe-Needle U-100 0.5 mL 30 gauge x 5/16\" syrg Use as directed  Syringe 3    insulin regular (NOVOLIN R, HUMULIN R) 100 unit/mL injection 10 Units by SubCUTAneous route two (2) times a day. 3 Vial 2    insulin glargine (Lantus Solostar U-100 Insulin) 100 unit/mL (3 mL) inpn 30 Units by SubCUTAneous route two (2) times a day. 6 Pen 1    aspirin 81 mg chewable tablet Take 1 Tab by mouth daily. 90 Tab 1    multivit-min/FA/lycopen/lutein (CENTRUM SILVER MEN PO) Take  by mouth.  isosorbide-hydrALAZINE (BiDiL) 20-37.5 mg per tablet Take 1 Tab by mouth three (3) times daily. 90 Tab 0    atorvastatin (LIPITOR) 40 mg tablet Take 1 Tab by mouth daily.  30 Tab 0    carvediloL (COREG) 25 mg tablet Take 1 Tab by mouth two (2) times daily (with meals). 60 Tab 0    hydroCHLOROthiazide (HYDRODIURIL) 25 mg tablet Take 1 Tab by mouth daily. 30 Tab 0    metFORMIN (GLUCOPHAGE) 1,000 mg tablet Take 1 Tab by mouth two (2) times daily (with meals). 60 Tab 0    omeprazole (PRILOSEC) 40 mg capsule Take 1 Cap by mouth daily. 30 Cap 0    tamsulosin (FLOMAX) 0.4 mg capsule Take 1 Cap by mouth daily. 30 Cap 0    furosemide (LASIX) 40 mg tablet Take 1 Tab by mouth daily. 30 Tab 0    amitriptyline (ELAVIL) 25 mg tablet Take 1 Tab by mouth nightly. 30 Tab 0    ergocalciferol (ERGOCALCIFEROL) 1,250 mcg (50,000 unit) capsule TK 1 C PO 1 TIME A WK       No Known Allergies    Objective:  Visit Vitals  /70   Pulse 70   Temp 98.7 °F (37.1 °C) (Oral)   Resp 16   Ht 6' 1\" (1.854 m)   Wt 248 lb (112.5 kg)   SpO2 94%   BMI 32.72 kg/m²     Physical Exam:   General appearance - alert, well appearing, and in no distress  Mental status - alert, oriented to person, place, and time  EYE-JUAN, EOMI, corneas normal, no foreign bodies  ENT-ENT exam normal, no neck nodes or sinus tenderness  Nose - normal and patent, no erythema, discharge or polyps  Mouth - mucous membranes moist, pharynx normal without lesions  Neck - supple, no significant adenopathy   Chest - clear to auscultation, no wheezes, rales or rhonchi, symmetric air entry   Heart - normal rate, regular rhythm, normal S1, S2, no murmurs, rubs, clicks or gallops   Abdomen - soft, nontender, nondistended, no masses or organomegaly  Lymph- no adenopathy palpable  Ext-peripheral pulses normal, no pedal edema, no clubbing or cyanosis  Skin-Warm and dry.  no hyperpigmentation, vitiligo, or suspicious lesions  Neuro -alert, oriented, normal speech, no focal findings or movement disorder noted  Neck-normal C-spine, no tenderness, full ROM without pain  Feet-no nail deformities or callus formation with good pulses noted      Results for orders placed or performed in visit on 12/07/20   HCV RNA JONATHAN QUALITATIVE   Result Value Ref Range    Hepatitis C RNA, QL, JONATHAN Positive (A) Negative     TESTOSTERONE, FREE   Result Value Ref Range    Free testosterone (Direct) 7.9 6.6 - 18.1 pg/mL   VITAMIN D, 25 HYDROXY   Result Value Ref Range    Vitamin D 25-Hydroxy 33.9 30 - 100 ng/mL   SAMPLES BEING HELD   Result Value Ref Range    SAMPLES BEING HELD 1sst     COMMENT        Add-on orders for these samples will be processed based on acceptable specimen integrity and analyte stability, which may vary by analyte. Assessment/Plan:    ICD-10-CM ICD-9-CM    1. Need for home health care  Z74.2 V60.4    2. Essential hypertension  I10 401.9    3. Coronary artery disease with angina pectoris, unspecified vessel or lesion type, unspecified whether native or transplanted heart ([facility] [address]  I25.119 414.00      413.9    4. Type II diabetes mellitus with complication (HCC)  X21.2 250.90    5. Diabetic peripheral vascular disorder (HCC)  E11.51 250.70      443.81    6. Nocturia  R35.1 788.43 REFERRAL TO UROLOGY   7. History of asthma  Z87.09 V12.69    8. Erectile dysfunction, unspecified erectile dysfunction type  N52.9 607.84 REFERRAL TO UROLOGY   9. Dependent for medication administration  Z74.1 V60.89      Orders Placed This Encounter    REFERRAL TO UROLOGY     Referral Priority:   Routine     Referral Type:   Consultation     Referral Reason:   Specialty Services Required     Referral Location:   [facility]     Referred to Provider:   Filiberto Griffith MD     Number of Visits Requested:   1     Patient Instructions          Coronary Artery Disease: Care Instructions  Your Care Instructions     The heart is a muscle, and like any muscle, it needs blood to work well. Coronary artery disease occurs when the arteries that bring oxygen-rich blood to your heart have a buildup of plaque--deposits of fats and other substances. Plaque can reduce blood flow to the heart muscle.  This can cause angina symptoms such as chest pain or pressure. A heart attack can happen if blood flow is completely blocked. You can do a lot to improve your health and prevent a heart attack. Eating healthy food, not smoking, getting regular exercise, and taking your medicine are the main things you can do every day to stay healthy. Follow-up care is a key part of your treatment and safety. Be sure to make and go to all appointments, and call your doctor if you are having problems. It's also a good idea to know your test results and keep a list of the medicines you take. How can you care for yourself at home? Medicines    · Be safe with medicines. Take your medicines exactly as prescribed. Call your doctor if you think you are having a problem with your medicine. You will get more details on the specific medicines your doctor prescribes.     · You will take medicines that lower your risk of a heart attack and lower your risk of dying early from heart disease. These medicines include:  ? Angiotensin-converting enzyme (ACE) inhibitors or angiotensin II receptor blockers (ARBs). They lower blood pressure. ? Aspirin and other blood thinners. They prevent blood clots that could cause a heart attack. ? Beta-blockers. They lower the heart's workload. ? Statins and other cholesterol medicines. They lower cholesterol.     · If your doctor has given you nitroglycerin for angina symptoms (such as chest pain or pressure) keep it with you at all times. If you have symptoms, sit down and rest, and take the first dose of nitroglycerin as directed. If your symptoms get worse or are not getting better within 5 minutes, call 911 right away. Stay on the phone. The emergency  will give you further instructions.     · Do not take any over-the-counter medicines, vitamins, or herbal products without talking to your doctor first.   Lifestyle  Ask your doctor if a cardiac rehab program is right for you.  Cardiac rehab can help you make lifestyle changes. In cardiac rehab, a team of health professionals provides education and support to help you make new, healthy habits.    · Do not smoke. Avoid secondhand smoke too. Smoking can increase your risk of a heart attack or stroke. If you need help quitting, talk to your doctor about stop-smoking programs and medicines. These can increase your chances of quitting for good.     · Eat heart-healthy foods. These include vegetables, fruits, nuts, beans, lean meat, fish, and whole grains. Limit saturated fat, sodium, and alcohol. Limit drinks and foods with added sugar.     · If your doctor recommends it, get more exercise. Ask your doctor what level of exercise is safe for you. Walking is a good choice. Bit by bit, increase the amount you walk every day. Try for at least 30 minutes on most days of the week. You also may want to swim, bike, or do other activities.     · Stay at a healthy weight. Lose weight if you need to.     · Manage other health problems. These include diabetes, high blood pressure, and high cholesterol. If you think you may have a problem with alcohol or drug use, talk to your doctor.     · If you have angina symptoms, pay attention to your symptoms. This can help you see what causes them and what is typical for you.     · Avoid colds and flu. Get a pneumococcal vaccine shot. If you have had one before, ask your doctor whether you need another dose. Get a flu vaccine every year. If you must be around people with colds or flu, wash your hands often.     · If you think you have symptoms of depression, talk to your doctor. Symptoms include feeling sad or hopeless most of the time, or losing interest in activities that used to make you happy. When should you call for help? Call 911 anytime you think you may need emergency care. For example, call if:    · You have symptoms of a heart attack. These may include:  ?  Chest pain or pressure, or a strange feeling in the chest.  ? Sweating. ? Shortness of breath. ? Nausea or vomiting. ? Pain, pressure, or a strange feeling in the back, neck, jaw, or upper belly or in one or both shoulders or arms. ? Lightheadedness or sudden weakness. ? A fast or irregular heartbeat. After you call 911, the  may tell you to chew 1 adult-strength or 2 to 4 low-dose aspirin. Wait for an ambulance. Do not try to drive yourself.     · You have angina symptoms (such as chest pain or pressure) that do not go away with rest or are not getting better within 5 minutes after you take a dose of nitroglycerin.     · You passed out (lost consciousness). Call your doctor now or seek immediate medical care if:    · You are having angina symptoms, such as chest pain or pressure, more often than usual, or they are different or worse than usual.     · You have new or increased shortness of breath.     · You are dizzy or lightheaded, or you feel like you may faint. Watch closely for changes in your health, and be sure to contact your doctor if you have any problems. Where can you learn more? Go to http://www.gray.com/  Enter F688 in the search box to learn more about \"Coronary Artery Disease: Care Instructions. \"  Current as of: December 16, 2019               Content Version: 12.6  © 6669-7697 [employer]. Care instructions adapted under license by [employer] (which disclaims liability or warranty for this information). If you have questions about a medical condition or this instruction, always ask your healthcare professional. [place] [address] any warranty or liability for your use of this information. Counting Carbohydrates: Care Instructions  Your Care Instructions     You don't have to eat special foods when you have diabetes. You just have to be careful to eat healthy foods. Carbohydrates (carbs) raise blood sugar higher and quicker than any other nutrient.  Carbs are found in desserts, breads and cereals, and fruit. They're also in starchy vegetables. These include potatoes, corn, and grains such as rice and pasta. Carbs are also in milk and yogurt. The more carbs you eat at one time, the higher your blood sugar will rise. Spreading carbs all through the day helps keep your blood sugar levels within your target range. Counting carbs is one of the best ways to keep your blood sugar under control. If you use insulin, counting carbs helps you match the right amount of insulin to the number of grams of carbs in a meal. Then you can change your diet and insulin dose as needed. Testing your blood sugar several times a day can help you learn how carbs affect your blood sugar. A registered dietitian or certified diabetes educator can help you plan meals and snacks. Follow-up care is a key part of your treatment and safety. Be sure to make and go to all appointments, and call your doctor if you are having problems. It's also a good idea to know your test results and keep a list of the medicines you take. How can you care for yourself at home? Know your daily amount of carbohydrates  Your daily amount depends on several things, such as your weight, how active you are, which diabetes medicines you take, and what your goals are for your blood sugar levels. A registered dietitian or certified diabetes educator can help you plan how many carbs to include in each meal and snack. For most adults, a guideline for the daily amount of carbs is:  · 45 to 60 grams at each meal. That's about the same as 3 to 4 carbohydrate servings. · 15 to 20 grams at each snack. That's about the same as 1 carbohydrate serving. Count carbs  Counting carbs lets you know how much rapid-acting insulin to take before you eat. If you use an insulin pump, you get a constant rate of insulin during the day. So the pump must be programmed at meals.  This gives you extra insulin to cover the rise in blood sugar after meals.  If you take insulin:  · Learn your own insulin-to-carb ratio. You and your diabetes health professional will figure out the ratio. You can do this by testing your blood sugar after meals. For example, you may need a certain amount of insulin for every 15 grams of carbs. · Add up the carb grams in a meal. Then you can figure out how many units of insulin to take based on your insulin-to-carb ratio. · Exercise lowers blood sugar. You can use less insulin than you would if you were not doing exercise. Keep in mind that timing matters. If you exercise within 1 hour after a meal, your body may need less insulin for that meal than it would if you exercised 3 hours after the meal. Test your blood sugar to find out how exercise affects your need for insulin. If you do or don't take insulin:  · Look at labels on packaged foods. This can tell you how many carbs are in a serving. You can also use guides from the American Diabetes Association. · Be aware of portions, or serving sizes. If a package has two servings and you eat the whole package, you need to double the number of grams of carbohydrate listed for one serving. · Protein, fat, and fiber do not raise blood sugar as much as carbs do. If you eat a lot of these nutrients in a meal, your blood sugar will rise more slowly than it would otherwise. Eat from all food groups  · Eat at least three meals a day. · Plan meals to include food from all the food groups. The food groups include grains, fruits, dairy, proteins, and vegetables. · Talk to your dietitian or diabetes educator about ways to add limited amounts of sweets into your meal plan. · If you drink alcohol, talk to your doctor. It may not be recommended when you are taking certain diabetes medicines. Where can you learn more? Go to http://reina-kiara.info/  Enter G703 in the search box to learn more about \"Counting Carbohydrates: Care Instructions. \"  Current as of: December 20, 2019               Content Version: 12.6  © 8065-7005 [email or web address]. Care instructions adapted under license by [employer] (which disclaims liability or warranty for this information). If you have questions about a medical condition or this instruction, always ask your healthcare professional. [address] any warranty or liability for your use of this information. High Blood Pressure: Care Instructions  Overview     It's normal for blood pressure to go up and down throughout the day. But if it stays up, you have high blood pressure. Another name for high blood pressure is hypertension. Despite what a lot of people think, high blood pressure usually doesn't cause headaches or make you feel dizzy or lightheaded. It usually has no symptoms. But it does increase your risk of stroke, heart attack, and other problems. You and your doctor will talk about your risks of these problems based on your blood pressure. Your doctor will give you a goal for your blood pressure. Your goal will be based on your health and your age. Lifestyle changes, such as eating healthy and being active, are always important to help lower blood pressure. You might also take medicine to reach your blood pressure goal.  Follow-up care is a key part of your treatment and safety. Be sure to make and go to all appointments, and call your doctor if you are having problems. It's also a good idea to know your test results and keep a list of the medicines you take. How can you care for yourself at home? Medical treatment  · If you stop taking your medicine, your blood pressure will go back up. You may take one or more types of medicine to lower your blood pressure. Be safe with medicines. Take your medicine exactly as prescribed. Call your doctor if you think you are having a problem with your medicine. · Talk to your doctor before you start taking aspirin every day.  Aspirin can help certain people lower their risk of a heart attack or stroke. But taking aspirin isn't right for everyone, because it can cause serious bleeding. · See your doctor regularly. You may need to see the doctor more often at first or until your blood pressure comes down. · If you are taking blood pressure medicine, talk to your doctor before you take decongestants or anti-inflammatory medicine, such as ibuprofen. Some of these medicines can raise blood pressure. · Learn how to check your blood pressure at home. Lifestyle changes  · Stay at a healthy weight. This is especially important if you put on weight around the waist. Losing even 10 pounds can help you lower your blood pressure. · If your doctor recommends it, get more exercise. Walking is a good choice. Bit by bit, increase the amount you walk every day. Try for at least 30 minutes on most days of the week. You also may want to swim, bike, or do other activities. · Avoid or limit alcohol. Talk to your doctor about whether you can drink any alcohol. · Try to limit how much sodium you eat to less than 2,300 milligrams (mg) a day. Your doctor may ask you to try to eat less than 1,500 mg a day. · Eat plenty of fruits (such as bananas and oranges), vegetables, legumes, whole grains, and low-fat dairy products. · Lower the amount of saturated fat in your diet. Saturated fat is found in animal products such as milk, cheese, and meat. Limiting these foods may help you lose weight and also lower your risk for heart disease. · Do not smoke. Smoking increases your risk for heart attack and stroke. If you need help quitting, talk to your doctor about stop-smoking programs and medicines. These can increase your chances of quitting for good. When should you call for help? Call  911 anytime you think you may need emergency care. This may mean having symptoms that suggest that your blood pressure is causing a serious heart or blood vessel problem.  Your blood pressure may be over 180/120. For example, call 911 if:    · You have symptoms of a heart attack. These may include:  ? Chest pain or pressure, or a strange feeling in the chest.  ? Sweating. ? Shortness of breath. ? Nausea or vomiting. ? Pain, pressure, or a strange feeling in the back, neck, jaw, or upper belly or in one or both shoulders or arms. ? Lightheadedness or sudden weakness. ? A fast or irregular heartbeat.     · You have symptoms of a stroke. These may include:  ? Sudden numbness, tingling, weakness, or loss of movement in your face, arm, or leg, especially on only one side of your body. ? Sudden vision changes. ? Sudden trouble speaking. ? Sudden confusion or trouble understanding simple statements. ? Sudden problems with walking or balance. ? A sudden, severe headache that is different from past headaches.     · You have severe back or belly pain. Do not wait until your blood pressure comes down on its own. Get help right away. Call your doctor now or seek immediate care if:    · Your blood pressure is much higher than normal (such as 180/120 or higher), but you don't have symptoms.     · You think high blood pressure is causing symptoms, such as:  ? Severe headache.  ? Blurry vision. Watch closely for changes in your health, and be sure to contact your doctor if:    · Your blood pressure measures higher than your doctor recommends at least 2 times. That means the top number is higher or the bottom number is higher, or both.     · You think you may be having side effects from your blood pressure medicine. Where can you learn more? Go to http://www.gray.com/  Enter [id number] in the search box to learn more about \"High Blood Pressure: Care Instructions. \"  Current as of: December 16, 2019               Content Version: 12.6  © 2419-3556 [employer].    Care instructions adapted under license by [employer] (which disclaims liability or warranty for this information). If you have questions about a medical condition or this instruction, always ask your healthcare professional. [address] any warranty or liability for your use of this information. Erection Problems: Care Instructions  Overview     A man has erection problems when he routinely can't get or keep an erection that allows satisfactory sex. He may not be able to have an erection at any time. Or he may not be able to have one that is firm enough or lasts long enough to complete intercourse. The problem is also called erectile dysfunction (ED). It's not the same as having trouble getting an erection now and then. That's common. It happens to most men at some time. Erection problems can be caused by problems with the blood vessels, nerves, or hormones. They can be caused by diabetes, heart disease, and injuries. Nerve problems also can cause them. Medicines, alcohol, and tobacco also can cause problems. So can depression, stress, grief, or relationship problems. Follow-up care is a key part of your treatment and safety. Be sure to make and go to all appointments, and call your doctor if you are having problems. It's also a good idea to know your test results and keep a list of the medicines you take. How can you care for yourself at home? Lifestyle    · Limit alcohol. Have no more than 2 drinks a day.     · Do not smoke. Smoking makes it harder for the blood vessels in the penis to relax and let blood flow in. If you need help quitting, talk to your doctor about stop-smoking programs and medicines. These can increase your chances of quitting for good.     · Do not use cocaine, heroin, or other illegal drugs.     · Try to reduce stress.     · Give yourself time to adjust to change. Changes in your job, family, relationships, home life, and other areas can cause stress. And stress can cause erection problems.    Work with your partner    · Don't assume that you know what your partner likes when it comes to sex. You may be wrong. Talk about what each of you does and does not enjoy.     · Make time outside of the bedroom to talk about your sex life. If you avoid sex because you are afraid of having erection problems, your partner may worry that you are no longer interested.     · If you and your partner have trouble talking about sex, see a therapist. They may help you talk about it. Reading books with your partner about sexual health may also help.     · Relax. Take time for more foreplay. Worrying about your erections may only make things worse. Medicines    · Tell your doctor about all the medicines that you take. ? Some medicines can cause erection problems. ? Some medicines can have dangerous interactions with medicines that are prescribed for erection problems. These include over-the-counter medicines and herbal products.     · Be safe with medicines. Take your medicines exactly as prescribed. Call your doctor if you think you are having a problem with your medicine.     · Talk to your doctor about trying a medicine to help you keep an erection. This could be a medicine like sildenafil (such as Viagra), tadalafil (such as Cialis), or vardenafil (such as Levitra). If you have a heart problem, ask your doctor if these are safe for you. Do not take these medicines if you take nitroglycerin or other nitrate medicine. When should you call for help? Call your doctor now or seek immediate medical care if:    · You took a medicine for erection problems and you have an erection that lasts longer than 3 hours. Watch closely for changes in your health, and be sure to contact your doctor if you have any problems. Where can you learn more? Go to http://www.gray.com/  Enter J531 in the search box to learn more about \"Erection Problems: Care Instructions. \"  Current as of: February 11, 2020               Content Version: 12.6  © 3774-2664 [employer]. Care instructions adapted under license by [employer] (which disclaims liability or warranty for this information). If you have questions about a medical condition or this instruction, always ask your healthcare professional. [place] [address] any warranty or liability for your use of this information. Follow-up and Dispositions    · Return in about 3 weeks (around 2/18/2021), or if symptoms worsen or fail to improve, for Labs, Medication Recncilliation, F/U ED and UAI . PCP called [address] [place] [address] Worker Alexander Aguilar [phone]  to discuss options for patient to receive assistance in the home. Per Alexander Aguilar she would submit a referral to the DSS on the patients behalf so that he could be assessed for assistance in the home. I have reviewed with the patient details of the assessment and plan and all questions were answered. Relevent patient education was performed. The most recent lab findings were reviewed with the patient. An After Visit Summary was printed and given to the patient.

## 2021-02-11 ENCOUNTER — TELEPHONE (OUTPATIENT)
Dept: INTERNAL MEDICINE CLINIC | Age: 71
End: 2021-02-11

## 2021-02-11 NOTE — TELEPHONE ENCOUNTER
PCP received a phone call from Abhijeet Vargas [facility]) in reference to getting the UAI completed for the patient. Per Abhijeet Vargas the [place] DSS needs a copy of the patient's medical records. She is requesting that the office reach out to [facility] at the [address] [phone] to discuss this request.    PCP attempted to reach [facility] to address her request, no answer was received. A voice message was left with return contact information to the [facility] office. PCP spoke with Krystle Barton who confirmed that the patient needs to sign a letter of release to diana her permission to share his records with the DSS. PCP contacted the patient to let him know that he can come by the office to complete a letter of release to give access of his medical records to the DSS. Per the patient he will be at the office today 2- to sign documents with the .

## 2021-02-11 NOTE — TELEPHONE ENCOUNTER
PCP received a return pc from [facility] at [address] [place] [address] in reference to patient's need for UAI completion. She was advised that the patient was coming into the office to get his medical records as requested. She was then advised to call back with any questions, needs and/or concerns and verbalized full understanding.

## 2021-06-28 ENCOUNTER — OFFICE VISIT (OUTPATIENT)
Dept: INTERNAL MEDICINE CLINIC | Age: 71
End: 2021-06-28
Payer: MEDICAID

## 2021-06-28 ENCOUNTER — TELEPHONE (OUTPATIENT)
Dept: INTERNAL MEDICINE CLINIC | Age: 71
End: 2021-06-28

## 2021-06-28 VITALS
RESPIRATION RATE: 16 BRPM | TEMPERATURE: 98.4 F | HEIGHT: 73 IN | BODY MASS INDEX: 29.95 KG/M2 | SYSTOLIC BLOOD PRESSURE: 129 MMHG | OXYGEN SATURATION: 98 % | DIASTOLIC BLOOD PRESSURE: 76 MMHG | WEIGHT: 226 LBS | HEART RATE: 86 BPM

## 2021-06-28 DIAGNOSIS — R35.1 NOCTURIA: ICD-10-CM

## 2021-06-28 DIAGNOSIS — I25.119 CORONARY ARTERY DISEASE WITH ANGINA PECTORIS, UNSPECIFIED VESSEL OR LESION TYPE, UNSPECIFIED WHETHER NATIVE OR TRANSPLANTED HEART (HCC): ICD-10-CM

## 2021-06-28 DIAGNOSIS — I26.99 ACUTE PULMONARY EMBOLISM, UNSPECIFIED PULMONARY EMBOLISM TYPE, UNSPECIFIED WHETHER ACUTE COR PULMONALE PRESENT (HCC): ICD-10-CM

## 2021-06-28 DIAGNOSIS — K92.1 MELENA: ICD-10-CM

## 2021-06-28 DIAGNOSIS — I10 ESSENTIAL HYPERTENSION: ICD-10-CM

## 2021-06-28 DIAGNOSIS — N52.9 ERECTILE DYSFUNCTION, UNSPECIFIED ERECTILE DYSFUNCTION TYPE: ICD-10-CM

## 2021-06-28 DIAGNOSIS — E11.51 DIABETIC PERIPHERAL VASCULAR DISORDER (HCC): ICD-10-CM

## 2021-06-28 DIAGNOSIS — Z76.89 ENCOUNTER FOR SUPPORT AND COORDINATION OF TRANSITION OF CARE: Primary | ICD-10-CM

## 2021-06-28 DIAGNOSIS — E11.8 TYPE II DIABETES MELLITUS WITH COMPLICATION (HCC): ICD-10-CM

## 2021-06-28 DIAGNOSIS — Z86.39 HISTORY OF VITAMIN D DEFICIENCY: ICD-10-CM

## 2021-06-28 LAB
25(OH)D3 SERPL-MCNC: 30.2 NG/ML (ref 30–100)
ALBUMIN SERPL-MCNC: 3.8 G/DL (ref 3.5–5)
ALBUMIN/GLOB SERPL: 1 {RATIO} (ref 1.1–2.2)
ALP SERPL-CCNC: 110 U/L (ref 45–117)
ALT SERPL-CCNC: 31 U/L (ref 12–78)
ANION GAP SERPL CALC-SCNC: 8 MMOL/L (ref 5–15)
AST SERPL-CCNC: 23 U/L (ref 15–37)
BASOPHILS # BLD: 0.1 K/UL (ref 0–0.1)
BASOPHILS NFR BLD: 1 % (ref 0–1)
BILIRUB SERPL-MCNC: 0.5 MG/DL (ref 0.2–1)
BUN SERPL-MCNC: 11 MG/DL (ref 6–20)
BUN/CREAT SERPL: 19 (ref 12–20)
CALCIUM SERPL-MCNC: 9.8 MG/DL (ref 8.5–10.1)
CHLORIDE SERPL-SCNC: 106 MMOL/L (ref 97–108)
CO2 SERPL-SCNC: 22 MMOL/L (ref 21–32)
CREAT SERPL-MCNC: 0.57 MG/DL (ref 0.7–1.3)
DIFFERENTIAL METHOD BLD: ABNORMAL
EOSINOPHIL # BLD: 0.2 K/UL (ref 0–0.4)
EOSINOPHIL NFR BLD: 3 % (ref 0–7)
ERYTHROCYTE [DISTWIDTH] IN BLOOD BY AUTOMATED COUNT: 12.5 % (ref 11.5–14.5)
GLOBULIN SER CALC-MCNC: 3.8 G/DL (ref 2–4)
GLUCOSE SERPL-MCNC: 160 MG/DL (ref 65–100)
HCT VFR BLD AUTO: 46.3 % (ref 36.6–50.3)
HGB BLD-MCNC: 15.1 G/DL (ref 12.1–17)
IMM GRANULOCYTES # BLD AUTO: 0.1 K/UL (ref 0–0.04)
IMM GRANULOCYTES NFR BLD AUTO: 1 % (ref 0–0.5)
INR PPP: 5 (ref 0.9–1.1)
LYMPHOCYTES # BLD: 3.2 K/UL (ref 0.8–3.5)
LYMPHOCYTES NFR BLD: 43 % (ref 12–49)
MCH RBC QN AUTO: 30.5 PG (ref 26–34)
MCHC RBC AUTO-ENTMCNC: 32.6 G/DL (ref 30–36.5)
MCV RBC AUTO: 93.5 FL (ref 80–99)
MONOCYTES # BLD: 0.4 K/UL (ref 0–1)
MONOCYTES NFR BLD: 6 % (ref 5–13)
NEUTS SEG # BLD: 3.6 K/UL (ref 1.8–8)
NEUTS SEG NFR BLD: 46 % (ref 32–75)
NRBC # BLD: 0 K/UL (ref 0–0.01)
NRBC BLD-RTO: 0 PER 100 WBC
PLATELET # BLD AUTO: 155 K/UL (ref 150–400)
PMV BLD AUTO: 11.7 FL (ref 8.9–12.9)
POTASSIUM SERPL-SCNC: 4.1 MMOL/L (ref 3.5–5.1)
PROT SERPL-MCNC: 7.6 G/DL (ref 6.4–8.2)
PROTHROMBIN TIME: 48.8 SEC (ref 9–11.1)
RBC # BLD AUTO: 4.95 M/UL (ref 4.1–5.7)
SODIUM SERPL-SCNC: 136 MMOL/L (ref 136–145)
WBC # BLD AUTO: 7.6 K/UL (ref 4.1–11.1)

## 2021-06-28 PROCEDURE — 83036 HEMOGLOBIN GLYCOSYLATED A1C: CPT | Performed by: NURSE PRACTITIONER

## 2021-06-28 PROCEDURE — 99215 OFFICE O/P EST HI 40 MIN: CPT | Performed by: NURSE PRACTITIONER

## 2021-06-28 PROCEDURE — 82962 GLUCOSE BLOOD TEST: CPT | Performed by: NURSE PRACTITIONER

## 2021-06-28 PROCEDURE — 80061 LIPID PANEL: CPT | Performed by: NURSE PRACTITIONER

## 2021-06-28 RX ORDER — APIXABAN 5 MG/1
TABLET, FILM COATED ORAL
COMMUNITY
Start: 2021-06-21 | End: 2021-06-28 | Stop reason: SDUPTHER

## 2021-06-28 RX ORDER — APIXABAN 5 MG/1
TABLET, FILM COATED ORAL
Qty: 30 TABLET | Refills: 2 | Status: SHIPPED | OUTPATIENT
Start: 2021-06-28 | End: 2021-06-28 | Stop reason: DRUGHIGH

## 2021-06-28 RX ORDER — ATORVASTATIN CALCIUM 80 MG/1
TABLET, FILM COATED ORAL
Status: ON HOLD | COMMUNITY
Start: 2021-06-17 | End: 2021-11-03 | Stop reason: SDUPTHER

## 2021-06-28 RX ORDER — ACETAMINOPHEN 325 MG/1
TABLET ORAL
COMMUNITY
Start: 2021-06-17

## 2021-06-28 RX ORDER — IBUPROFEN 200 MG
CAPSULE ORAL
Qty: 100 STRIP | Refills: 2 | Status: SHIPPED | OUTPATIENT
Start: 2021-06-28

## 2021-06-28 RX ORDER — LISINOPRIL 10 MG/1
TABLET ORAL
COMMUNITY
Start: 2021-06-17 | End: 2021-08-26 | Stop reason: SDUPTHER

## 2021-06-28 RX ORDER — POLYETHYLENE GLYCOL 3350 17 G/17G
POWDER, FOR SOLUTION ORAL
COMMUNITY
Start: 2021-06-19

## 2021-06-28 RX ORDER — LANCETS
EACH MISCELLANEOUS
Qty: 1 EACH | Refills: 2 | Status: SHIPPED | OUTPATIENT
Start: 2021-06-28

## 2021-06-28 RX ORDER — INSULIN PUMP SYRINGE, 3 ML
EACH MISCELLANEOUS
Qty: 1 KIT | Refills: 0 | Status: SHIPPED | OUTPATIENT
Start: 2021-06-28

## 2021-06-28 RX ORDER — TAMSULOSIN HYDROCHLORIDE 0.4 MG/1
CAPSULE ORAL
COMMUNITY
End: 2022-02-15

## 2021-06-28 RX ORDER — OXYCODONE HYDROCHLORIDE 5 MG/1
TABLET ORAL
COMMUNITY
Start: 2021-06-19 | End: 2021-07-30 | Stop reason: ALTCHOICE

## 2021-06-28 RX ORDER — CARVEDILOL 3.12 MG/1
TABLET ORAL
Status: ON HOLD | COMMUNITY
Start: 2021-06-20 | End: 2021-11-03 | Stop reason: SDUPTHER

## 2021-06-28 RX ORDER — OMEPRAZOLE 40 MG/1
CAPSULE, DELAYED RELEASE ORAL
COMMUNITY
End: 2021-08-27 | Stop reason: SDUPTHER

## 2021-06-28 RX ORDER — METHOCARBAMOL 500 MG/1
TABLET, FILM COATED ORAL
COMMUNITY
Start: 2021-06-21

## 2021-06-28 NOTE — PROGRESS NOTES
Identified pt with two pt identifiers(name and ). Reviewed record in preparation for visit and have obtained necessary documentation. Chief Complaint   Patient presents with   [address] for heart attack 2021        Health Maintenance Due   Topic    MICROALBUMIN Q1     Eye Exam Retinal or Dilated     Shingrix Vaccine Age 50> (1 of 2)    Colorectal Cancer Screening Combo         Visit Vitals  /76 (BP 1 Location: Right arm, BP Patient Position: Sitting, BP Cuff Size: Adult)   Pulse 86   Temp 98.4 °F (36.9 °C) (Oral)   Resp 16   Ht 6' 1\" (1.854 m)   Wt 226 lb (102.5 kg)   SpO2 98%   BMI 29.82 kg/m²     Pain Scale: 7/10    Coordination of Care Questionnaire:  :   1. Have you been to the ER, urgent care clinic since your last visit? Hospitalized since your last visit? [facility] for heart attack 2021    2. Have you seen or consulted any other health care providers outside of the [address] [place] [address] since your last visit? Include any pap smears or colon screening.  Yes

## 2021-06-28 NOTE — PATIENT INSTRUCTIONS
Learning About Coronary Artery Disease (CAD)  What is coronary artery disease? Coronary artery disease is a condition that occurs when plaque builds up in the arteries that bring oxygen-rich blood to your heart. Plaque is a fatty substance made of cholesterol, calcium, and other substances in the blood. This process is called hardening of the arteries, or atherosclerosis. What happens when you have coronary artery disease? · Plaque may narrow the coronary arteries. Narrowed arteries cause poor blood flow. This can lead to angina symptoms such as chest pain or discomfort. If blood flow is completely blocked, you could have a heart attack. · You can slow and reduce the risk of future problems by making changes in your lifestyle. These include quitting smoking and eating heart-healthy foods. · Treatment, along with changes in your lifestyle, can help you live a longer and healthier life. How can you prevent coronary artery disease? · Do not smoke. It may be the best thing you can do to prevent coronary artery disease. If you need help quitting, talk to your doctor about stop-smoking programs and medicines. These can increase your chances of quitting for good. · Be active. Try to do moderate activity at least 2½ hours a week. Or try to do vigorous activity at least 1¼ hours a week. You may want to walk or try other activities, such as running, swimming, cycling, or playing tennis or team sports. · Eat heart-healthy foods. Eat more fruits and vegetables and less food that contains saturated and trans fats. Limit alcohol, sodium, and sweets. · Stay at a healthy weight. Lose weight if you need to. · Manage other health problems such as diabetes, high blood pressure, and high cholesterol. How is coronary artery disease treated? · Your doctor will suggest that you make lifestyle changes. For example, your doctor may ask you to eat healthy foods, quit smoking, lose extra weight, and be more active.   · You will take medicines that help prevent a heart attack. · Your doctor may suggest a procedure to open narrowed or blocked arteries. This is called angioplasty. Or your doctor may suggest using healthy blood vessels to create detours around narrowed or blocked arteries. This is called bypass surgery. Follow-up care is a key part of your treatment and safety. Be sure to make and go to all appointments, and call your doctor if you are having problems. It's also a good idea to know your test results and keep a list of the medicines you take. Where can you learn more? Go to http://www.gray.com/  Enter C643 in the search box to learn more about \"Learning About Coronary Artery Disease (CAD). \"  Current as of: August 31, 2020               Content Version: 12.8  © 2006-2021 [employer]. Care instructions adapted under license by [employer] (which disclaims liability or warranty for this information). If you have questions about a medical condition or this instruction, always ask your healthcare professional. [place] [id number] any warranty or liability for your use of this information. 8 Things You Can Do to Help Prevent Blood Clots  A blood clot in a deep vein, usually in the legs, is called a deep vein thrombosis (DVT). A DVT can break loose and travel through the bloodstream to the lungs. Then the clot can block blood flow in the lungs (pulmonary embolism). This can be life-threatening. That's why it's important to take steps to prevent a clot. Take a blood-thinning medicine (called an anticoagulant), if prescribed. If your doctor prescribes medicine, take it as directed. Exercise your lower leg muscles. This helps keep the blood moving through your legs. Point your toes up toward your head so the calves of your legs are stretched, then relax. Repeat. This is a good exercise to do when you are sitting for long periods of time.      Get up out of bed as soon as your doctor says it's okay. Being active is one of the best things you can do to prevent clots. Take plenty of breaks when you travel. On long car trips, stop the car and walk around every hour or so. On the bus, plane, or train, get out of your seat and walk up and down the aisle every hour, if you can. Be active. Try to get 30 minutes or more of activity on most days of the week. Don't smoke. Smoking can increase your risk of blood clots. If you need help quitting, talk to your doctor about stop-smoking programs and medicines. Check with your doctor about whether you should use hormone forms of birth control or hormone therapy. These may increase your risk of blood clots. Use compression stockings if your doctor prescribes them. These are specially fitted stockings that may prevent blood clots by keeping blood from pooling in your legs. Current as of: May 27, 2020               Content Version: 12.8  © 2006-2021 [employer]. Care instructions adapted under license by [employer] (which disclaims liability or warranty for this information). If you have questions about a medical condition or this instruction, always ask your healthcare professional. [place] [address] any warranty or liability for your use of this information. Learning About Carbohydrate (Carb) Counting and Eating Out When You Have Diabetes  Why plan your meals? Meal planning can be a key part of managing diabetes. Planning meals and snacks with the right balance of carbohydrate, protein, and fat can help you keep your blood sugar at the target level you set with your doctor. You don't have to eat special foods. You can eat what your family eats, including sweets once in a while. But you do have to pay attention to how often you eat and how much you eat of certain foods. You may want to work with a dietitian or a certified diabetes educator.  He or she can give you tips and meal ideas and can answer your questions about meal planning. This health professional can also help you reach a healthy weight if that is one of your goals. What should you know about eating carbs? Managing the amount of carbohydrate (carbs) you eat is an important part of healthy meals when you have diabetes. Carbohydrate is found in many foods. · Learn which foods have carbs. And learn the amounts of carbs in different foods. ? Bread, cereal, pasta, and rice have about 15 grams of carbs in a serving. A serving is 1 slice of bread (1 ounce), ½ cup of cooked cereal, or 1/3 cup of cooked pasta or rice. ? Fruits have 15 grams of carbs in a serving. A serving is 1 small fresh fruit, such as an apple or orange; ½ of a banana; ½ cup of cooked or canned fruit; ½ cup of fruit juice; 1 cup of melon or raspberries; or 2 tablespoons of dried fruit. ? Milk and no-sugar-added yogurt have 15 grams of carbs in a serving. A serving is 1 cup of milk or 2/3 cup of no-sugar-added yogurt. ? Starchy vegetables have 15 grams of carbs in a serving. A serving is ½ cup of mashed potatoes or sweet potato; 1 cup winter squash; ½ of a small baked potato; ½ cup of cooked beans; or ½ cup cooked corn or green peas. · Learn how much carbs to eat each day and at each meal. A dietitian or CDE can teach you how to keep track of the amount of carbs you eat. This is called carbohydrate counting. · If you are not sure how to count carbohydrate grams, use the Plate Method to plan meals. It is a good, quick way to make sure that you have a balanced meal. It also helps you spread carbs throughout the day. ? Divide your plate by types of foods. Put non-starchy vegetables on half the plate, meat or other protein food on one-quarter of the plate, and a grain or starchy vegetable in the final quarter of the plate.  To this you can add a small piece of fruit and 1 cup of milk or yogurt, depending on how many carbs you are supposed to eat at a meal.  · Try to eat about the same amount of carbs at each meal. Do not \"save up\" your daily allowance of carbs to eat at one meal.  · Proteins have very little or no carbs per serving. Examples of proteins are beef, chicken, turkey, fish, eggs, tofu, cheese, cottage cheese, and peanut butter. A serving size of meat is 3 ounces, which is about the size of a deck of cards. Examples of meat substitute serving sizes (equal to 1 ounce of meat) are 1/4 cup of cottage cheese, 1 egg, 1 tablespoon of peanut butter, and ½ cup of tofu. How can you eat out and still eat healthy? · Learn to estimate the serving sizes of foods that have carbohydrate. If you measure food at home, it will be easier to estimate the amount in a serving of restaurant food. · If the meal you order has too much carbohydrate (such as potatoes, corn, or baked beans), ask to have a low-carbohydrate food instead. Ask for a salad or green vegetables. · If you use insulin, check your blood sugar before and after eating out to help you plan how much to eat in the future. · If you eat more carbohydrate at a meal than you had planned, take a walk or do other exercise. This will help lower your blood sugar. What are some tips for eating healthy? · Limit saturated fat, such as the fat from meat and dairy products. This is a healthy choice because people who have diabetes are at higher risk of heart disease. So choose lean cuts of meat and nonfat or low-fat dairy products. Use olive or canola oil instead of butter or shortening when cooking. · Don't skip meals. Your blood sugar may drop too low if you skip meals and take insulin or certain medicines for diabetes. · Check with your doctor before you drink alcohol. Alcohol can cause your blood sugar to drop too low. Alcohol can also cause a bad reaction if you take certain diabetes medicines. Follow-up care is a key part of your treatment and safety.  Be sure to make and go to all appointments, and call your doctor if you are having problems. It's also a good idea to know your test results and keep a list of the medicines you take. Where can you learn more? Go to http://www.toussaint.com/  Enter I147 in the search box to learn more about \"Learning About Carbohydrate (Carb) Counting and Eating Out When You Have Diabetes. \"  Current as of: August 31, 2020               Content Version: 12.8  © 2006-2021 [employer]. Care instructions adapted under license by [employer] (which disclaims liability or warranty for this information). If you have questions about a medical condition or this instruction, always ask your healthcare professional. [address] any warranty or liability for your use of this information. High Blood Pressure: Care Instructions  Overview     It's normal for blood pressure to go up and down throughout the day. But if it stays up, you have high blood pressure. Another name for high blood pressure is hypertension. Despite what a lot of people think, high blood pressure usually doesn't cause headaches or make you feel dizzy or lightheaded. It usually has no symptoms. But it does increase your risk of stroke, heart attack, and other problems. You and your doctor will talk about your risks of these problems based on your blood pressure. Your doctor will give you a goal for your blood pressure. Your goal will be based on your health and your age. Lifestyle changes, such as eating healthy and being active, are always important to help lower blood pressure. You might also take medicine to reach your blood pressure goal.  Follow-up care is a key part of your treatment and safety. Be sure to make and go to all appointments, and call your doctor if you are having problems. It's also a good idea to know your test results and keep a list of the medicines you take.   How can you care for yourself at home?  Medical treatment  · If you stop taking your medicine, your blood pressure will go back up. You may take one or more types of medicine to lower your blood pressure. Be safe with medicines. Take your medicine exactly as prescribed. Call your doctor if you think you are having a problem with your medicine. · Talk to your doctor before you start taking aspirin every day. Aspirin can help certain people lower their risk of a heart attack or stroke. But taking aspirin isn't right for everyone, because it can cause serious bleeding. · See your doctor regularly. You may need to see the doctor more often at first or until your blood pressure comes down. · If you are taking blood pressure medicine, talk to your doctor before you take decongestants or anti-inflammatory medicine, such as ibuprofen. Some of these medicines can raise blood pressure. · Learn how to check your blood pressure at home. Lifestyle changes  · Stay at a healthy weight. This is especially important if you put on weight around the waist. Losing even 10 pounds can help you lower your blood pressure. · If your doctor recommends it, get more exercise. Walking is a good choice. Bit by bit, increase the amount you walk every day. Try for at least 30 minutes on most days of the week. You also may want to swim, bike, or do other activities. · Avoid or limit alcohol. Talk to your doctor about whether you can drink any alcohol. · Try to limit how much sodium you eat to less than 2,300 milligrams (mg) a day. Your doctor may ask you to try to eat less than 1,500 mg a day. · Eat plenty of fruits (such as bananas and oranges), vegetables, legumes, whole grains, and low-fat dairy products. · Lower the amount of saturated fat in your diet. Saturated fat is found in animal products such as milk, cheese, and meat. Limiting these foods may help you lose weight and also lower your risk for heart disease. · Do not smoke.  Smoking increases your risk for heart attack and stroke. If you need help quitting, talk to your doctor about stop-smoking programs and medicines. These can increase your chances of quitting for good. When should you call for help? Call  911 anytime you think you may need emergency care. This may mean having symptoms that suggest that your blood pressure is causing a serious heart or blood vessel problem. Your blood pressure may be over 180/120. For example, call 911 if:    · You have symptoms of a heart attack. These may include:  ? Chest pain or pressure, or a strange feeling in the chest.  ? Sweating. ? Shortness of breath. ? Nausea or vomiting. ? Pain, pressure, or a strange feeling in the back, neck, jaw, or upper belly or in one or both shoulders or arms. ? Lightheadedness or sudden weakness. ? A fast or irregular heartbeat.     · You have symptoms of a stroke. These may include:  ? Sudden numbness, tingling, weakness, or loss of movement in your face, arm, or leg, especially on only one side of your body. ? Sudden vision changes. ? Sudden trouble speaking. ? Sudden confusion or trouble understanding simple statements. ? Sudden problems with walking or balance. ? A sudden, severe headache that is different from past headaches.     · You have severe back or belly pain. Do not wait until your blood pressure comes down on its own. Get help right away. Call your doctor now or seek immediate care if:    · Your blood pressure is much higher than normal (such as 180/120 or higher), but you don't have symptoms.     · You think high blood pressure is causing symptoms, such as:  ? Severe headache.  ? Blurry vision. Watch closely for changes in your health, and be sure to contact your doctor if:    · Your blood pressure measures higher than your doctor recommends at least 2 times.  That means the top number is higher or the bottom number is higher, or both.     · You think you may be having side effects from your blood pressure medicine. Where can you learn more? Go to http://www.gray.com/  Enter [id number] in the search box to learn more about \"High Blood Pressure: Care Instructions. \"  Current as of: August 31, 2020               Content Version: 12.8  © 2006-2021 [employer]. Care instructions adapted under license by [employer] (which disclaims liability or warranty for this information). If you have questions about a medical condition or this instruction, always ask your healthcare professional. [address] any warranty or liability for your use of this information. Pulmonary Embolism: Care Instructions  Overview     Pulmonary embolism is the sudden blockage of an artery in the lung. Blood clots in the deep veins of the leg or pelvis (deep vein thrombosis, or DVT) are the most common cause. These blood clots can travel to the lungs. Pulmonary embolism can be very serious. Because you have had one pulmonary embolism, you are at greater risk for having another one. But you can take steps to prevent another pulmonary embolism by following your doctor's instructions. You will probably take a prescription blood-thinning medicine to prevent blood clots. A blood thinner can stop a blood clot from growing larger and prevent new clots from forming. Follow-up care is a key part of your treatment and safety. Be sure to make and go to all appointments, and call your doctor if you are having problems. It's also a good idea to know your test results and keep a list of the medicines you take. How can you care for yourself at home? · Take your medicines exactly as prescribed. Call your doctor if you think you are having a problem with your medicine. You will get more details on the specific medicines your doctor prescribes. · If you are taking a blood thinner, be sure you get instructions about how to take your medicine safely.  Blood thinners can cause serious bleeding problems. Preventing future pulmonary embolisms  · Exercise. Keep blood moving in your legs to keep clots from forming. If you are traveling by car, stop every hour or so. Get out and walk around for a few minutes. If you are traveling by bus, train, or plane, get out of your seat and walk up and down the aisles every hour or so. You also can do leg exercises while you are seated. Pump your feet up and down by pulling your toes up toward your knees then pointing them down. · Get up out of bed as soon as possible after an illness or surgery. · Do not smoke. If you need help quitting, talk to your doctor about stop-smoking programs and medicines. These can increase your chances of quitting for good. · Check with your doctor before taking hormone or birth control pills. These may increase your risk of blood clots. · Ask your doctor about wearing compression stockings to help prevent blood clots in your legs. There are different types of stockings, and they need to fit right. So your doctor will recommend what you need. When should you call for help? Call 911 anytime you think you may need emergency care. For example, call if:    · You have shortness of breath.     · You have chest pain.     · You passed out (lost consciousness).     · You cough up blood. Call your doctor now or seek immediate medical care if:    · You have new or worsening pain or swelling in your leg. Watch closely for changes in your health, and be sure to contact your doctor if:    · You do not get better as expected. Where can you learn more? Go to http://www.gray.com/  Enter B717 in the search box to learn more about \"Pulmonary Embolism: Care Instructions. \"  Current as of: March 4, 2020               Content Version: 12.8  © 5212-6075 [employer]. Care instructions adapted under license by [employer] (which disclaims liability or warranty for this information).  If you have questions about a medical condition or this instruction, always ask your healthcare professional. [address] any warranty or liability for your use of this information. Erection Problems: Care Instructions  Overview     A man has erection problems when he routinely can't get or keep an erection that allows satisfactory sex. He may not be able to have an erection at any time. Or he may not be able to have one that is firm enough or lasts long enough to complete intercourse. The problem is also called erectile dysfunction (ED). It's not the same as having trouble getting an erection now and then. That's common. It happens to most men at some time. Erection problems can be caused by problems with the blood vessels, nerves, or hormones. They can be caused by diabetes, heart disease, and injuries. Nerve problems also can cause them. Medicines, alcohol, and tobacco also can cause problems. So can depression, stress, grief, or relationship problems. Follow-up care is a key part of your treatment and safety. Be sure to make and go to all appointments, and call your doctor if you are having problems. It's also a good idea to know your test results and keep a list of the medicines you take. How can you care for yourself at home? Lifestyle    · Limit alcohol. Have no more than 2 drinks a day.     · Do not smoke. Smoking makes it harder for the blood vessels in the penis to relax and let blood flow in. If you need help quitting, talk to your doctor about stop-smoking programs and medicines. These can increase your chances of quitting for good.     · Do not use cocaine, heroin, or other illegal drugs.     · Try to reduce stress.     · Give yourself time to adjust to change. Changes in your job, family, relationships, home life, and other areas can cause stress. And stress can cause erection problems.    Work with your partner    · Don't assume that you know what your partner likes when it comes to sex. You may be wrong. Talk about what each of you does and does not enjoy.     · Make time outside of the bedroom to talk about your sex life. If you avoid sex because you are afraid of having erection problems, your partner may worry that you are no longer interested.     · If you and your partner have trouble talking about sex, see a therapist. They may help you talk about it. Reading books with your partner about sexual health may also help.     · Relax. Take time for more foreplay. Worrying about your erections may only make things worse. Medicines    · Tell your doctor about all the medicines that you take. ? Some medicines can cause erection problems. ? Some medicines can have dangerous interactions with medicines that are prescribed for erection problems. These include over-the-counter medicines and herbal products.     · Be safe with medicines. Take your medicines exactly as prescribed. Call your doctor if you think you are having a problem with your medicine.     · Talk to your doctor about trying a medicine to help you keep an erection. This could be a medicine like sildenafil (such as Viagra), tadalafil (such as Cialis), or vardenafil (such as Levitra). If you have a heart problem, ask your doctor if these are safe for you. Do not take these medicines if you take nitroglycerin or other nitrate medicine. When should you call for help? Call your doctor now or seek immediate medical care if:    · You took a medicine for erection problems and you have an erection that lasts longer than 3 hours. Watch closely for changes in your health, and be sure to contact your doctor if you have any problems. Where can you learn more? Go to http://www.gray.com/  Enter J531 in the search box to learn more about \"Erection Problems: Care Instructions. \"  Current as of: February 11, 2020               Content Version: 12.8  © 0450-7981 [employer].    Care instructions adapted under license by [employer] (which disclaims liability or warranty for this information). If you have questions about a medical condition or this instruction, always ask your healthcare professional. [address] any warranty or liability for your use of this information.

## 2021-06-28 NOTE — PROGRESS NOTES
Subjective:  Daniela Gomez is a 72 y.o. male and presents with Hypertension, Diabetes, Hyperlipidemia, CAD and Erectile Dysfunction for Transitions Of Care ([facility] June 2021)    Hypertension Review:  The patient has essential hypertension which is now well managed, 129/76 at today's visit. Diet and Lifestyle: generally follows a  low sodium diet, exercises sporadically  Home BP Monitoring: is not measured at home. Pertinent ROS: taking medications as instructed, no medication side effects noted, no TIA's, no chest pain on exertion, no dyspnea on exertion, no swelling of ankles.      Diabetes Mellitus Review:  Patient is presenting for evaluation and follow up for Diabetes Mellitus Type II. Diabetic ROS - medication compliance: compliant most of the time, diabetic diet compliance: compliant most of the time,   Known diabetic complications: none  Cardiovascular risk factors: family history, dyslipidemia, diabetes mellitus, obesity, hypertension  Current diabetic medications include: Metformin and  insulin   Eye exam current (within one year): no  Weight trend: stable  Prior visit with dietician: no  Current diet: \"healthy\" diet  in general  Current exercise: walking  Any episodes of hypoglycemia? No     Hyperlipidemia Review:  Patient presents for follow up and evaluation of lipids. A repeat fasting lipid profile was done at today's office visit. Patient claims that he is taking medications as prescribed and is adhering to dietary recommendations. Patient states that he does exercise but not regularly. He was advised of exercise recommendations of 150 minutes a week. The patient does not use medications that may worsen dyslipidemias (corticosteroids, progestins, anabolic steroids, amiodarone, cyclosporine, olanzapine).       Coronary Disease Review:  Patient denies having any chest pain today. There has not been the need to use NTG.  Previous cardiac testing has included: Electrocardiogram (EKG), Echocardiogram.    Erectile dysfunction Review[de-identified]  Patient complains of having difficulty in getting and maintaining an adequate erection. Per the patient all recent attempts to have sex without medications have been unsuccessful. Review of Systems  Constitutional: negative for fevers, chills, anorexia and weight loss  Eyes:   negative for visual disturbance and irritation  ENT:   negative for tinnitus,sore throat,nasal congestion,ear pains. hoarseness  Respiratory:  negative for cough, hemoptysis, dyspnea,wheezing  CV:   negative for chest pain, palpitations, lower extremity edema  GI:   negative for nausea, vomiting, diarrhea, abdominal pain, +melena  Endo:               negative for polyuria,polydipsia,polyphagia,heat intolerance  Genitourinary: negative for frequency, dysuria and hematuria  Integument:  negative for rash and pruritus  Hematologic:  negative for easy bruising and gum/nose bleeding  Musculoskel: negative for myalgias, arthralgias, back pain, muscle weakness, joint pain  Neurological:  negative for headaches, dizziness, vertigo, memory problems and gait   Behavl/Psych: negative for feelings of anxiety, depression, mood changes    Past Medical History:   Diagnosis Date    CAD (coronary artery disease)     Diabetes ([facility] [address] [facility]     GERD (gastroesophageal reflux disease)     [facility]     Hypertension      Past Surgical History:   Procedure Laterality Date    HX CORONARY ARTERY BYPASS GRAFT      [address]     Social History     Socioeconomic History    Marital status:      Spouse name: Not on file    Number of children: Not on file    Years of education: Not on file    Highest education level: Not on file   Tobacco Use    Smoking status: Current Every Day Smoker     Packs/day: 0.50     Types: Cigarettes    Smokeless tobacco: Never Used   Substance and Sexual Activity    Alcohol use: No    Drug use: Not Currently    Sexual activity: Not Currently     Social Determinants of Health Financial Resource Strain:     Difficulty of Paying Living Expenses:    Food Insecurity:     Worried About Running Out of Food in the Last Year:     [address] in the Last Year:    Transportation Needs:     Lack of Transportation (Medical):  Lack of Transportation (Non-Medical):    Physical Activity:     Days of Exercise per Week:     Minutes of Exercise per Session:    Stress:     Feeling of Stress :    Social Connections:     Frequency of Communication with Friends and Family:     Frequency of Social Gatherings with Friends and Family:     Attends [employer] Services:     Active Member of Clubs or Organizations:     Attends Club or Organization Meetings:     Marital Status:      History reviewed. No pertinent family history. Current Outpatient Medications   Medication Sig Dispense Refill    acetaminophen (TYLENOL) 325 mg tablet TAKE 3 TABLETS BY MOUTH EVERY 8 HOURS FOR 14 DAYS. (GENERIC FOR TYLENOL)      lisinopriL (PRINIVIL, ZESTRIL) 10 mg tablet TAKE ONE TABLET BY MOUTH EVERY DAY      methocarbamoL (ROBAXIN) 500 mg tablet TAKE ONE TABLET BY MOUTH TWICE A DAY      oxyCODONE IR (ROXICODONE) 5 mg immediate release tablet TAKE ONE TABLET BY MOUTH EVERY 6 HOURS AS NEEDED FOR PAIN      polyethylene glycol (MIRALAX) 17 gram/dose powder MIX 17 GRAMS (1 CAPFUL) IN 4 TO 8 OUNCES OF LIQUID AND DRINK ONCE DAILY.  atorvastatin (LIPITOR) 80 mg tablet TAKE ONE TABLET BY MOUTH EVERY DAY      carvediloL (COREG) 3.125 mg tablet TAKE ONE TABLET BY MOUTH EVERY 12 HOURS      omeprazole (PRILOSEC) 40 mg capsule 1 cap(s)      Blood-Glucose Meter monitoring kit Patient with Type II DM needs glucometer for blood glucose monitoring. DX code E11.9 1 Kit 0    glucose blood VI test strips (blood glucose test) strip Please measure blood glucose 2 times per day. DX code: E11.9 100 Strip 2    lancets misc Please measure blood glucose 2 times per day.  DX Code E11.9 1 Each 2    apixaban (ELIQUIS) 2.5 mg tablet Take 1 Tablet by mouth two (2) times a day. 30 Tablet 0    multivit-min/FA/lycopen/lutein (CENTRUM SILVER MEN PO) Take  by mouth.  albuterol (PROVENTIL HFA, VENTOLIN HFA, PROAIR HFA) 90 mcg/actuation inhaler Take 2 Puffs by inhalation every four (4) hours as needed for Wheezing for up to 360 days. 1 Inhaler 2    metFORMIN (GLUCOPHAGE) 1,000 mg tablet Take 1 Tab by mouth two (2) times daily (with meals). 60 Tab 0    omeprazole (PRILOSEC) 40 mg capsule Take 1 Cap by mouth daily. 30 Cap 0    tamsulosin (FLOMAX) 0.4 mg capsule Take 1 Cap by mouth daily. 30 Cap 0    tamsulosin (FLOMAX) 0.4 mg capsule 1 cap(s) (Patient not taking: Reported on 6/28/2021)      Insulin Needles, Disposable, (Comfort EZ Pen Needles) 32 gauge x 5/32\" ndle E11.6 use for insulin SQ injection as directed (Patient not taking: Reported on 6/28/2021) 100 Pen Needle 2    Insulin Syringe-Needle U-100 0.5 mL 30 gauge x 5/16\" syrg Use as directed BID (Patient not taking: Reported on 6/28/2021) 200 Syringe 3    insulin regular (NOVOLIN R, HUMULIN R) 100 unit/mL injection 10 Units by SubCUTAneous route two (2) times a day. (Patient not taking: Reported on 6/28/2021) 3 Vial 2    insulin glargine (Lantus Solostar U-100 Insulin) 100 unit/mL (3 mL) inpn 30 Units by SubCUTAneous route two (2) times a day. (Patient not taking: Reported on 6/28/2021) 6 Pen 1    isosorbide-hydrALAZINE (BiDiL) 20-37.5 mg per tablet Take 1 Tab by mouth three (3) times daily. (Patient not taking: Reported on 6/28/2021) 90 Tab 0    atorvastatin (LIPITOR) 40 mg tablet Take 1 Tab by mouth daily. (Patient not taking: Reported on 6/28/2021) 30 Tab 0    carvediloL (COREG) 25 mg tablet Take 1 Tab by mouth two (2) times daily (with meals). (Patient not taking: Reported on 6/28/2021) 60 Tab 0    hydroCHLOROthiazide (HYDRODIURIL) 25 mg tablet Take 1 Tab by mouth daily.  (Patient not taking: Reported on 6/28/2021) 30 Tab 0    furosemide (LASIX) 40 mg tablet Take 1 Tab by mouth daily. (Patient not taking: Reported on 6/28/2021) 30 Tab 0    aspirin 81 mg chewable tablet Take 1 Tab by mouth daily. (Patient not taking: Reported on 6/28/2021) 90 Tab 1    ergocalciferol (ERGOCALCIFEROL) 1,250 mcg (50,000 unit) capsule TK 1 C PO 1 TIME A WK (Patient not taking: Reported on 6/28/2021)       No Known Allergies    Objective:  Visit Vitals  /76 (BP 1 Location: Right arm, BP Patient Position: Sitting, BP Cuff Size: Adult)   Pulse 86   Temp 98.4 °F (36.9 °C) (Oral)   Resp 16   Ht 6' 1\" (1.854 m)   Wt 226 lb (102.5 kg)   SpO2 98%   BMI 29.82 kg/m²     Physical Exam:   General appearance - alert, well appearing, and in no distress  Mental status - alert, oriented to person, place, and time  EYE-JUAN, EOMI, corneas normal, no foreign bodies  ENT-ENT exam normal, no neck nodes or sinus tenderness  Nose - normal and patent, no erythema, discharge or polyps  Mouth - mucous membranes moist, pharynx normal without lesions  Neck - supple, no significant adenopathy   Chest - clear to auscultation, no wheezes, rales or rhonchi, symmetric air entry   Heart - normal rate, regular rhythm, normal S1, S2, no murmurs, rubs, clicks or gallops   Abdomen - soft, nontender, nondistended, no masses or organomegaly  Lymph- no adenopathy palpable  Ext-peripheral pulses normal, no pedal edema, no clubbing or cyanosis  Skin-Warm and dry.  no hyperpigmentation, vitiligo, or suspicious lesions  Neuro -alert, oriented, normal speech, no focal findings or movement disorder noted  Musculoskeletal-normal C-spine, no tenderness, full ROM without pain  Feet-no nail deformities or callus formation with good pulses noted      Results for orders placed or performed in visit on 06/28/21   PROTHROMBIN TIME + INR   Result Value Ref Range    INR 5.0 (HH) 0.9 - 1.1      Prothrombin time 48.8 (H) 9.0 - 11.1 sec   VITAMIN D, 25 HYDROXY   Result Value Ref Range    Vitamin D 25-Hydroxy 30.2 30 - 709 ng/mL   METABOLIC PANEL, COMPREHENSIVE   Result Value Ref Range    Sodium 136 136 - 145 mmol/L    Potassium 4.1 3.5 - 5.1 mmol/L    Chloride 106 97 - 108 mmol/L    CO2 22 21 - 32 mmol/L    Anion gap 8 5 - 15 mmol/L    Glucose 160 (H) 65 - 100 mg/dL    BUN 11 6 - 20 MG/DL    Creatinine 0.57 (L) 0.70 - 1.30 MG/DL    BUN/Creatinine ratio 19 12 - 20      GFR est AA >60 >60 ml/min/1.73m2    GFR est non-AA >60 >60 ml/min/1.73m2    Calcium 9.8 8.5 - 10.1 MG/DL    Bilirubin, total 0.5 0.2 - 1.0 MG/DL    ALT (SGPT) 31 12 - 78 U/L    AST (SGOT) 23 15 - 37 U/L    Alk. phosphatase 110 45 - 117 U/L    Protein, total 7.6 6.4 - 8.2 g/dL    Albumin 3.8 3.5 - 5.0 g/dL    Globulin 3.8 2.0 - 4.0 g/dL    A-G Ratio 1.0 (L) 1.1 - 2.2     CBC WITH AUTOMATED DIFF   Result Value Ref Range    WBC 7.6 4.1 - 11.1 K/uL    RBC 4.95 4.10 - 5.70 M/uL    HGB 15.1 12.1 - 17.0 g/dL    HCT 46.3 36.6 - 50.3 %    MCV 93.5 80.0 - 99.0 FL    MCH 30.5 26.0 - 34.0 PG    MCHC 32.6 30.0 - 36.5 g/dL    RDW 12.5 11.5 - 14.5 %    PLATELET 972 524 - 807 K/uL    MPV 11.7 8.9 - 12.9 FL    NRBC 0.0 0  WBC    ABSOLUTE NRBC 0.00 0.00 - 0.01 K/uL    NEUTROPHILS 46 32 - 75 %    LYMPHOCYTES 43 12 - 49 %    MONOCYTES 6 5 - 13 %    EOSINOPHILS 3 0 - 7 %    BASOPHILS 1 0 - 1 %    IMMATURE GRANULOCYTES 1 (H) 0.0 - 0.5 %    ABS. NEUTROPHILS 3.6 1.8 - 8.0 K/UL    ABS. LYMPHOCYTES 3.2 0.8 - 3.5 K/UL    ABS. MONOCYTES 0.4 0.0 - 1.0 K/UL    ABS. EOSINOPHILS 0.2 0.0 - 0.4 K/UL    ABS. BASOPHILS 0.1 0.0 - 0.1 K/UL    ABS. IMM.  GRANS. 0.1 (H) 0.00 - 0.04 K/UL    DF AUTOMATED     AMB POC LIPID PROFILE   Result Value Ref Range    Cholesterol (POC) 120     Triglycerides (POC) 124     HDL Cholesterol (POC) 35     Non-HDL Cholesterol 85     LDL Cholesterol (POC) 60 MG/DL    TChol/HDL Ratio (POC) 3.4    AMB POC HEMOGLOBIN A1C   Result Value Ref Range    Hemoglobin A1c (POC) 7.9 %   AMB POC GLUCOSE BLOOD, BY GLUCOSE MONITORING DEVICE   Result Value Ref Range    Glucose  MG/DL       Assessment/Plan: ICD-10-CM ICD-9-CM    1. Encounter for support and coordination of transition of care  Z76.89 V65.8    2. Acute pulmonary embolism, unspecified pulmonary embolism type, unspecified whether acute cor pulmonale present ([facility])  I26.99 415.19 PROTHROMBIN TIME + INR      PROTHROMBIN TIME + INR      REFERRAL TO PULMONARY DISEASE      apixaban (ELIQUIS) 2.5 mg tablet      DISCONTINUED: Eliquis 5 mg tablet   3. Essential hypertension  I10 401.9 CBC WITH AUTOMATED DIFF      METABOLIC PANEL, COMPREHENSIVE      AMB POC LIPID PROFILE      METABOLIC PANEL, COMPREHENSIVE      CBC WITH AUTOMATED DIFF   4. Type II diabetes mellitus with complication ([facility])  J21.5 250.90 AMB POC HEMOGLOBIN A1C      AMB POC GLUCOSE BLOOD, BY GLUCOSE MONITORING DEVICE      AMB POC URINALYSIS DIP STICK AUTO W/O MICRO      Blood-Glucose Meter monitoring kit      glucose blood VI test strips (blood glucose test) strip      lancets [facility]   5. Coronary artery disease with angina pectoris, unspecified vessel or lesion type, unspecified whether native or transplanted heart ([facility]  I25.119 414.00      413.9    6. Diabetic peripheral vascular disorder ([facility])  E11.51 250.70      443.81    7. Melena  K92.1 578.1    8. Nocturia  R35.1 788.43    9. Erectile dysfunction, unspecified erectile dysfunction type  N52.9 607.84    10.  History of vitamin D deficiency  Z86.39 V12.1 VITAMIN D, 25 HYDROXY      VITAMIN D, 25 HYDROXY     Orders Placed This Encounter    CBC WITH AUTOMATED DIFF     Standing Status:   Future     Number of Occurrences:   1     Standing Expiration Date:   9/42/4973    METABOLIC PANEL, COMPREHENSIVE     Standing Status:   Future     Number of Occurrences:   1     Standing Expiration Date:   6/28/2022    VITAMIN D, 25 HYDROXY     Standing Status:   Future     Number of Occurrences:   1     Standing Expiration Date:   6/28/2022    PROTHROMBIN TIME + INR     Standing Status:   Future     Number of Occurrences:   1     Standing Expiration Date: 2022    [facility] Pulmonary Disease [facility]     Referral Priority:   Routine     Referral Type:   Consultation     Referral Reason:   Specialty Services Required     Referred to Provider:   Heather Smith MD     Number of Visits Requested:   1    AMB POC LIPID PROFILE    AMB POC HEMOGLOBIN A1C    AMB POC GLUCOSE BLOOD, BY GLUCOSE MONITORING DEVICE    AMB POC URINALYSIS DIP STICK AUTO W/O MICRO    acetaminophen (TYLENOL) 325 mg tablet     Sig: TAKE 3 TABLETS BY MOUTH EVERY 8 HOURS FOR 14 DAYS. (GENERIC FOR TYLENOL)    DISCONTD: Eliquis 5 mg tablet     Sig: TAKE ONE TABLET BY MOUTH EVERY 12 HOURS    lisinopriL (PRINIVIL, ZESTRIL) 10 mg tablet     Sig: TAKE ONE TABLET BY MOUTH EVERY DAY    methocarbamoL (ROBAXIN) 500 mg tablet     Sig: TAKE ONE TABLET BY MOUTH TWICE A DAY    oxyCODONE IR (ROXICODONE) 5 mg immediate release tablet     Sig: TAKE ONE TABLET BY MOUTH EVERY 6 HOURS AS NEEDED FOR PAIN    polyethylene glycol (MIRALAX) 17 gram/dose powder     Sig: MIX 17 GRAMS (1 CAPFUL) IN 4 TO 8 OUNCES OF LIQUID AND DRINK ONCE DAILY.  atorvastatin (LIPITOR) 80 mg tablet     Sig: TAKE ONE TABLET BY MOUTH EVERY DAY    carvediloL (COREG) 3.125 mg tablet     Sig: TAKE ONE TABLET BY MOUTH EVERY 12 HOURS    omeprazole (PRILOSEC) 40 mg capsule     Si cap(s)    tamsulosin (FLOMAX) 0.4 mg capsule     Si cap(s)    Blood-Glucose Meter monitoring kit     Sig: Patient with Type II DM needs glucometer for blood glucose monitoring. DX code E11.9     Dispense:  1 Kit     Refill:  0    glucose blood VI test strips (blood glucose test) strip     Sig: Please measure blood glucose 2 times per day. DX code: E11.9     Dispense:  100 Strip     Refill:  2    lancets misc     Sig: Please measure blood glucose 2 times per day.  DX Code E11.9     Dispense:  1 Each     Refill:  2    DISCONTD: Eliquis 5 mg tablet     Sig: TAKE ONE TABLET BY MOUTH EVERY 12 HOURS     Dispense:  30 Tablet     Refill:  2    apixaban (ELIQUIS) 2.5 mg tablet     Sig: Take 1 Tablet by mouth two (2) times a day. Dispense:  30 Tablet     Refill:  0     Patient Instructions          Learning About Coronary Artery Disease (CAD)  What is coronary artery disease? Coronary artery disease is a condition that occurs when plaque builds up in the arteries that bring oxygen-rich blood to your heart. Plaque is a fatty substance made of cholesterol, calcium, and other substances in the blood. This process is called hardening of the arteries, or atherosclerosis. What happens when you have coronary artery disease? · Plaque may narrow the coronary arteries. Narrowed arteries cause poor blood flow. This can lead to angina symptoms such as chest pain or discomfort. If blood flow is completely blocked, you could have a heart attack. · You can slow and reduce the risk of future problems by making changes in your lifestyle. These include quitting smoking and eating heart-healthy foods. · Treatment, along with changes in your lifestyle, can help you live a longer and healthier life. How can you prevent coronary artery disease? · Do not smoke. It may be the best thing you can do to prevent coronary artery disease. If you need help quitting, talk to your doctor about stop-smoking programs and medicines. These can increase your chances of quitting for good. · Be active. Try to do moderate activity at least 2½ hours a week. Or try to do vigorous activity at least 1¼ hours a week. You may want to walk or try other activities, such as running, swimming, cycling, or playing tennis or team sports. · Eat heart-healthy foods. Eat more fruits and vegetables and less food that contains saturated and trans fats. Limit alcohol, sodium, and sweets. · Stay at a healthy weight. Lose weight if you need to. · Manage other health problems such as diabetes, high blood pressure, and high cholesterol. How is coronary artery disease treated?   · Your doctor will suggest that you make lifestyle changes. For example, your doctor may ask you to eat healthy foods, quit smoking, lose extra weight, and be more active. · You will take medicines that help prevent a heart attack. · Your doctor may suggest a procedure to open narrowed or blocked arteries. This is called angioplasty. Or your doctor may suggest using healthy blood vessels to create detours around narrowed or blocked arteries. This is called bypass surgery. Follow-up care is a key part of your treatment and safety. Be sure to make and go to all appointments, and call your doctor if you are having problems. It's also a good idea to know your test results and keep a list of the medicines you take. Where can you learn more? Go to http://www.gray.com/  Enter C643 in the search box to learn more about \"Learning About Coronary Artery Disease (CAD). \"  Current as of: August 31, 2020               Content Version: 12.8  © 2006-2021 [employer]. Care instructions adapted under license by [employer] (which disclaims liability or warranty for this information). If you have questions about a medical condition or this instruction, always ask your healthcare professional. [place] [address] any warranty or liability for your use of this information. 8 Things You Can Do to Help Prevent Blood Clots  A blood clot in a deep vein, usually in the legs, is called a deep vein thrombosis (DVT). A DVT can break loose and travel through the bloodstream to the lungs. Then the clot can block blood flow in the lungs (pulmonary embolism). This can be life-threatening. That's why it's important to take steps to prevent a clot. Take a blood-thinning medicine (called an anticoagulant), if prescribed. If your doctor prescribes medicine, take it as directed. Exercise your lower leg muscles. This helps keep the blood moving through your legs.  Point your toes up toward your head so the calves of your legs are stretched, then relax. Repeat. This is a good exercise to do when you are sitting for long periods of time. Get up out of bed as soon as your doctor says it's okay. Being active is one of the best things you can do to prevent clots. Take plenty of breaks when you travel. On long car trips, stop the car and walk around every hour or so. On the bus, plane, or train, get out of your seat and walk up and down the aisle every hour, if you can. Be active. Try to get 30 minutes or more of activity on most days of the week. Don't smoke. Smoking can increase your risk of blood clots. If you need help quitting, talk to your doctor about stop-smoking programs and medicines. Check with your doctor about whether you should use hormone forms of birth control or hormone therapy. These may increase your risk of blood clots. Use compression stockings if your doctor prescribes them. These are specially fitted stockings that may prevent blood clots by keeping blood from pooling in your legs. Current as of: May 27, 2020               Content Version: 12.8  © 2006-2021 [employer]. Care instructions adapted under license by [employer] (which disclaims liability or warranty for this information). If you have questions about a medical condition or this instruction, always ask your healthcare professional. [address] any warranty or liability for your use of this information. Learning About Carbohydrate (Carb) Counting and Eating Out When You Have Diabetes  Why plan your meals? Meal planning can be a key part of managing diabetes. Planning meals and snacks with the right balance of carbohydrate, protein, and fat can help you keep your blood sugar at the target level you set with your doctor. You don't have to eat special foods. You can eat what your family eats, including sweets once in a while.  But you do have to pay attention to how often you eat and how much you eat of certain foods. You may want to work with a dietitian or a certified diabetes educator. He or she can give you tips and meal ideas and can answer your questions about meal planning. This health professional can also help you reach a healthy weight if that is one of your goals. What should you know about eating carbs? Managing the amount of carbohydrate (carbs) you eat is an important part of healthy meals when you have diabetes. Carbohydrate is found in many foods. · Learn which foods have carbs. And learn the amounts of carbs in different foods. ? Bread, cereal, pasta, and rice have about 15 grams of carbs in a serving. A serving is 1 slice of bread (1 ounce), ½ cup of cooked cereal, or 1/3 cup of cooked pasta or rice. ? Fruits have 15 grams of carbs in a serving. A serving is 1 small fresh fruit, such as an apple or orange; ½ of a banana; ½ cup of cooked or canned fruit; ½ cup of fruit juice; 1 cup of melon or raspberries; or 2 tablespoons of dried fruit. ? Milk and no-sugar-added yogurt have 15 grams of carbs in a serving. A serving is 1 cup of milk or 2/3 cup of no-sugar-added yogurt. ? Starchy vegetables have 15 grams of carbs in a serving. A serving is ½ cup of mashed potatoes or sweet potato; 1 cup winter squash; ½ of a small baked potato; ½ cup of cooked beans; or ½ cup cooked corn or green peas. · Learn how much carbs to eat each day and at each meal. A dietitian or CDE can teach you how to keep track of the amount of carbs you eat. This is called carbohydrate counting. · If you are not sure how to count carbohydrate grams, use the Plate Method to plan meals. It is a good, quick way to make sure that you have a balanced meal. It also helps you spread carbs throughout the day. ? Divide your plate by types of foods.  Put non-starchy vegetables on half the plate, meat or other protein food on one-quarter of the plate, and a grain or starchy vegetable in the final quarter of the plate. To this you can add a small piece of fruit and 1 cup of milk or yogurt, depending on how many carbs you are supposed to eat at a meal.  · Try to eat about the same amount of carbs at each meal. Do not \"save up\" your daily allowance of carbs to eat at one meal.  · Proteins have very little or no carbs per serving. Examples of proteins are beef, chicken, turkey, fish, eggs, tofu, cheese, cottage cheese, and peanut butter. A serving size of meat is 3 ounces, which is about the size of a deck of cards. Examples of meat substitute serving sizes (equal to 1 ounce of meat) are 1/4 cup of cottage cheese, 1 egg, 1 tablespoon of peanut butter, and ½ cup of tofu. How can you eat out and still eat healthy? · Learn to estimate the serving sizes of foods that have carbohydrate. If you measure food at home, it will be easier to estimate the amount in a serving of restaurant food. · If the meal you order has too much carbohydrate (such as potatoes, corn, or baked beans), ask to have a low-carbohydrate food instead. Ask for a salad or green vegetables. · If you use insulin, check your blood sugar before and after eating out to help you plan how much to eat in the future. · If you eat more carbohydrate at a meal than you had planned, take a walk or do other exercise. This will help lower your blood sugar. What are some tips for eating healthy? · Limit saturated fat, such as the fat from meat and dairy products. This is a healthy choice because people who have diabetes are at higher risk of heart disease. So choose lean cuts of meat and nonfat or low-fat dairy products. Use olive or canola oil instead of butter or shortening when cooking. · Don't skip meals. Your blood sugar may drop too low if you skip meals and take insulin or certain medicines for diabetes. · Check with your doctor before you drink alcohol. Alcohol can cause your blood sugar to drop too low.  Alcohol can also cause a bad reaction if you take certain diabetes medicines. Follow-up care is a key part of your treatment and safety. Be sure to make and go to all appointments, and call your doctor if you are having problems. It's also a good idea to know your test results and keep a list of the medicines you take. Where can you learn more? Go to http://www.toussaint.com/  Enter I147 in the search box to learn more about \"Learning About Carbohydrate (Carb) Counting and Eating Out When You Have Diabetes. \"  Current as of: August 31, 2020               Content Version: 12.8  © 2006-2021 [employer]. Care instructions adapted under license by [employer] (which disclaims liability or warranty for this information). If you have questions about a medical condition or this instruction, always ask your healthcare professional. [address] any warranty or liability for your use of this information. High Blood Pressure: Care Instructions  Overview     It's normal for blood pressure to go up and down throughout the day. But if it stays up, you have high blood pressure. Another name for high blood pressure is hypertension. Despite what a lot of people think, high blood pressure usually doesn't cause headaches or make you feel dizzy or lightheaded. It usually has no symptoms. But it does increase your risk of stroke, heart attack, and other problems. You and your doctor will talk about your risks of these problems based on your blood pressure. Your doctor will give you a goal for your blood pressure. Your goal will be based on your health and your age. Lifestyle changes, such as eating healthy and being active, are always important to help lower blood pressure. You might also take medicine to reach your blood pressure goal.  Follow-up care is a key part of your treatment and safety.  Be sure to make and go to all appointments, and call your doctor if you are having problems. It's also a good idea to know your test results and keep a list of the medicines you take. How can you care for yourself at home? Medical treatment  · If you stop taking your medicine, your blood pressure will go back up. You may take one or more types of medicine to lower your blood pressure. Be safe with medicines. Take your medicine exactly as prescribed. Call your doctor if you think you are having a problem with your medicine. · Talk to your doctor before you start taking aspirin every day. Aspirin can help certain people lower their risk of a heart attack or stroke. But taking aspirin isn't right for everyone, because it can cause serious bleeding. · See your doctor regularly. You may need to see the doctor more often at first or until your blood pressure comes down. · If you are taking blood pressure medicine, talk to your doctor before you take decongestants or anti-inflammatory medicine, such as ibuprofen. Some of these medicines can raise blood pressure. · Learn how to check your blood pressure at home. Lifestyle changes  · Stay at a healthy weight. This is especially important if you put on weight around the waist. Losing even 10 pounds can help you lower your blood pressure. · If your doctor recommends it, get more exercise. Walking is a good choice. Bit by bit, increase the amount you walk every day. Try for at least 30 minutes on most days of the week. You also may want to swim, bike, or do other activities. · Avoid or limit alcohol. Talk to your doctor about whether you can drink any alcohol. · Try to limit how much sodium you eat to less than 2,300 milligrams (mg) a day. Your doctor may ask you to try to eat less than 1,500 mg a day. · Eat plenty of fruits (such as bananas and oranges), vegetables, legumes, whole grains, and low-fat dairy products. · Lower the amount of saturated fat in your diet. Saturated fat is found in animal products such as milk, cheese, and meat. Limiting these foods may help you lose weight and also lower your risk for heart disease. · Do not smoke. Smoking increases your risk for heart attack and stroke. If you need help quitting, talk to your doctor about stop-smoking programs and medicines. These can increase your chances of quitting for good. When should you call for help? Call  911 anytime you think you may need emergency care. This may mean having symptoms that suggest that your blood pressure is causing a serious heart or blood vessel problem. Your blood pressure may be over 180/120. For example, call 911 if:    · You have symptoms of a heart attack. These may include:  ? Chest pain or pressure, or a strange feeling in the chest.  ? Sweating. ? Shortness of breath. ? Nausea or vomiting. ? Pain, pressure, or a strange feeling in the back, neck, jaw, or upper belly or in one or both shoulders or arms. ? Lightheadedness or sudden weakness. ? A fast or irregular heartbeat.     · You have symptoms of a stroke. These may include:  ? Sudden numbness, tingling, weakness, or loss of movement in your face, arm, or leg, especially on only one side of your body. ? Sudden vision changes. ? Sudden trouble speaking. ? Sudden confusion or trouble understanding simple statements. ? Sudden problems with walking or balance. ? A sudden, severe headache that is different from past headaches.     · You have severe back or belly pain. Do not wait until your blood pressure comes down on its own. Get help right away. Call your doctor now or seek immediate care if:    · Your blood pressure is much higher than normal (such as 180/120 or higher), but you don't have symptoms.     · You think high blood pressure is causing symptoms, such as:  ? Severe headache.  ? Blurry vision. Watch closely for changes in your health, and be sure to contact your doctor if:    · Your blood pressure measures higher than your doctor recommends at least 2 times.  That means the top number is higher or the bottom number is higher, or both.     · You think you may be having side effects from your blood pressure medicine. Where can you learn more? Go to http://www.gray.com/  Enter [id number] in the search box to learn more about \"High Blood Pressure: Care Instructions. \"  Current as of: August 31, 2020               Content Version: 12.8  © 2006-2021 [employer]. Care instructions adapted under license by [employer] (which disclaims liability or warranty for this information). If you have questions about a medical condition or this instruction, always ask your healthcare professional. [place] [address] any warranty or liability for your use of this information. Pulmonary Embolism: Care Instructions  Overview     Pulmonary embolism is the sudden blockage of an artery in the lung. Blood clots in the deep veins of the leg or pelvis (deep vein thrombosis, or DVT) are the most common cause. These blood clots can travel to the lungs. Pulmonary embolism can be very serious. Because you have had one pulmonary embolism, you are at greater risk for having another one. But you can take steps to prevent another pulmonary embolism by following your doctor's instructions. You will probably take a prescription blood-thinning medicine to prevent blood clots. A blood thinner can stop a blood clot from growing larger and prevent new clots from forming. Follow-up care is a key part of your treatment and safety. Be sure to make and go to all appointments, and call your doctor if you are having problems. It's also a good idea to know your test results and keep a list of the medicines you take. How can you care for yourself at home? · Take your medicines exactly as prescribed. Call your doctor if you think you are having a problem with your medicine. You will get more details on the specific medicines your doctor prescribes.   · If you are taking a blood thinner, be sure you get instructions about how to take your medicine safely. Blood thinners can cause serious bleeding problems. Preventing future pulmonary embolisms  · Exercise. Keep blood moving in your legs to keep clots from forming. If you are traveling by car, stop every hour or so. Get out and walk around for a few minutes. If you are traveling by bus, train, or plane, get out of your seat and walk up and down the aisles every hour or so. You also can do leg exercises while you are seated. Pump your feet up and down by pulling your toes up toward your knees then pointing them down. · Get up out of bed as soon as possible after an illness or surgery. · Do not smoke. If you need help quitting, talk to your doctor about stop-smoking programs and medicines. These can increase your chances of quitting for good. · Check with your doctor before taking hormone or birth control pills. These may increase your risk of blood clots. · Ask your doctor about wearing compression stockings to help prevent blood clots in your legs. There are different types of stockings, and they need to fit right. So your doctor will recommend what you need. When should you call for help? Call 911 anytime you think you may need emergency care. For example, call if:    · You have shortness of breath.     · You have chest pain.     · You passed out (lost consciousness).     · You cough up blood. Call your doctor now or seek immediate medical care if:    · You have new or worsening pain or swelling in your leg. Watch closely for changes in your health, and be sure to contact your doctor if:    · You do not get better as expected. Where can you learn more? Go to http://www.gray.com/  Enter A805 in the search box to learn more about \"Pulmonary Embolism: Care Instructions. \"  Current as of: March 4, 2020               Content Version: 12.8  © 2496-6242 Healthwise, [facility].    Care instructions adapted under license by [employer] (which disclaims liability or warranty for this information). If you have questions about a medical condition or this instruction, always ask your healthcare professional. [address] any warranty or liability for your use of this information. Erection Problems: Care Instructions  Overview     A man has erection problems when he routinely can't get or keep an erection that allows satisfactory sex. He may not be able to have an erection at any time. Or he may not be able to have one that is firm enough or lasts long enough to complete intercourse. The problem is also called erectile dysfunction (ED). It's not the same as having trouble getting an erection now and then. That's common. It happens to most men at some time. Erection problems can be caused by problems with the blood vessels, nerves, or hormones. They can be caused by diabetes, heart disease, and injuries. Nerve problems also can cause them. Medicines, alcohol, and tobacco also can cause problems. So can depression, stress, grief, or relationship problems. Follow-up care is a key part of your treatment and safety. Be sure to make and go to all appointments, and call your doctor if you are having problems. It's also a good idea to know your test results and keep a list of the medicines you take. How can you care for yourself at home? Lifestyle    · Limit alcohol. Have no more than 2 drinks a day.     · Do not smoke. Smoking makes it harder for the blood vessels in the penis to relax and let blood flow in. If you need help quitting, talk to your doctor about stop-smoking programs and medicines. These can increase your chances of quitting for good.     · Do not use cocaine, heroin, or other illegal drugs.     · Try to reduce stress.     · Give yourself time to adjust to change.  Changes in your job, family, relationships, home life, and other areas can cause stress. And stress can cause erection problems. Work with your partner    · Don't assume that you know what your partner likes when it comes to sex. You may be wrong. Talk about what each of you does and does not enjoy.     · Make time outside of the bedroom to talk about your sex life. If you avoid sex because you are afraid of having erection problems, your partner may worry that you are no longer interested.     · If you and your partner have trouble talking about sex, see a therapist. They may help you talk about it. Reading books with your partner about sexual health may also help.     · Relax. Take time for more foreplay. Worrying about your erections may only make things worse. Medicines    · Tell your doctor about all the medicines that you take. ? Some medicines can cause erection problems. ? Some medicines can have dangerous interactions with medicines that are prescribed for erection problems. These include over-the-counter medicines and herbal products.     · Be safe with medicines. Take your medicines exactly as prescribed. Call your doctor if you think you are having a problem with your medicine.     · Talk to your doctor about trying a medicine to help you keep an erection. This could be a medicine like sildenafil (such as Viagra), tadalafil (such as Cialis), or vardenafil (such as Levitra). If you have a heart problem, ask your doctor if these are safe for you. Do not take these medicines if you take nitroglycerin or other nitrate medicine. When should you call for help? Call your doctor now or seek immediate medical care if:    · You took a medicine for erection problems and you have an erection that lasts longer than 3 hours. Watch closely for changes in your health, and be sure to contact your doctor if you have any problems. Where can you learn more?   Go to http://www.gray.com/  Enter J531 in the search box to learn more about \"Erection Problems: Care Instructions. \"  Current as of: February 11, 2020               Content Version: 12.8  © 8424-9669 [employer]. Care instructions adapted under license by [employer] (which disclaims liability or warranty for this information). If you have questions about a medical condition or this instruction, always ask your healthcare professional. [address] any warranty or liability for your use of this information. Follow-up and Dispositions    · Return in about 2 weeks (around 7/12/2021), or if symptoms worsen or fail to improve, for Discuss Test Results & Care Gaps. I have reviewed the patient's medical history in detail and updated the computerized patient record. We had a prolonged discussion about these complex clinical issues and went over the various important aspects to consider. Patient with difficulty comprehending instructions, caregiver/friend unable to come inside because he stated that he had tested positive for Croup. Conference call was performed with patient in room and caregiver on telephone to discuss medication changes and aspects of care. Caregiver Carol Ch) and patient both agreeable that [facility] will mook medication boxes and assist patient with medications. Both were instructed to call back with any further needs, questions and/or concerns. Advised patient to call back or return to office if symptoms do not improve, change in nature, or persist. Patient to schedule an appointment in 2 weeks to follow up from today's visit. Patient was given an after-visit summary or informed of [employer] [facility] which includes patient instructions, diagnoses, current medications, & vitals.

## 2021-06-29 LAB
CHOLEST SERPL-MCNC: 120 MG/DL
GLUCOSE POC: 162 MG/DL
HBA1C MFR BLD HPLC: 7.9 %
HDLC SERPL-MCNC: 35 MG/DL
LDL CHOLESTEROL POC: 60 MG/DL
NON-HDL CHOLESTEROL, 011976: 85
TCHOL/HDL RATIO (POC): 3.4
TRIGL SERPL-MCNC: 124 MG/DL

## 2021-06-30 NOTE — TELEPHONE ENCOUNTER
PCP received a critical Lab value for patient (INR was 5.0) per lab reporting. Patient on Eliquis 5 mg po every day, dose was decreased to 2.5mg. With Eliquis INR monitoring is not required but if patient is symptomatic, values may need to be assessed. Patient reported having blood on tissue when wiping, INR was drawn for further evaluation and medication effectiveness. Patient was referred to Pulmonologist, instructed to schedule an appointment to be seen as soon as possible for Pulmonary Embolism and medication management.

## 2021-07-06 ENCOUNTER — APPOINTMENT (OUTPATIENT)
Dept: CT IMAGING | Age: 71
End: 2021-07-06
Attending: EMERGENCY MEDICINE
Payer: MEDICAID

## 2021-07-06 ENCOUNTER — HOSPITAL ENCOUNTER (EMERGENCY)
Age: 71
Discharge: HOME | End: 2021-07-07
Attending: EMERGENCY MEDICINE
Payer: MEDICAID

## 2021-07-06 DIAGNOSIS — R07.89 ATYPICAL CHEST PAIN: Primary | ICD-10-CM

## 2021-07-06 LAB
ALBUMIN SERPL-MCNC: 2.9 G/DL (ref 3.5–5)
ALBUMIN/GLOB SERPL: 0.8 {RATIO} (ref 1.1–2.2)
ALP SERPL-CCNC: 78 U/L (ref 45–117)
ALT SERPL-CCNC: 22 U/L (ref 12–78)
ANION GAP SERPL CALC-SCNC: 6 MMOL/L (ref 5–15)
AST SERPL-CCNC: 24 U/L (ref 15–37)
BASOPHILS # BLD: 0 K/UL (ref 0–0.1)
BASOPHILS NFR BLD: 0 % (ref 0–1)
BILIRUB SERPL-MCNC: 0.7 MG/DL (ref 0.2–1)
BUN SERPL-MCNC: 6 MG/DL (ref 6–20)
BUN/CREAT SERPL: 7 (ref 12–20)
CALCIUM SERPL-MCNC: 8.5 MG/DL (ref 8.5–10.1)
CHLORIDE SERPL-SCNC: 107 MMOL/L (ref 97–108)
CO2 SERPL-SCNC: 30 MMOL/L (ref 21–32)
CREAT SERPL-MCNC: 0.84 MG/DL (ref 0.7–1.3)
DIFFERENTIAL METHOD BLD: ABNORMAL
EOSINOPHIL # BLD: 0.3 K/UL (ref 0–0.4)
EOSINOPHIL NFR BLD: 3 % (ref 0–7)
ERYTHROCYTE [DISTWIDTH] IN BLOOD BY AUTOMATED COUNT: 13.4 % (ref 11.5–14.5)
GLOBULIN SER CALC-MCNC: 3.6 G/DL (ref 2–4)
GLUCOSE SERPL-MCNC: 101 MG/DL (ref 65–100)
HCT VFR BLD AUTO: 37.5 % (ref 36.6–50.3)
HGB BLD-MCNC: 12.7 G/DL (ref 12.1–17)
IMM GRANULOCYTES # BLD AUTO: 0.1 K/UL (ref 0–0.04)
IMM GRANULOCYTES NFR BLD AUTO: 1 % (ref 0–0.5)
LYMPHOCYTES # BLD: 2.7 K/UL (ref 0.8–3.5)
LYMPHOCYTES NFR BLD: 30 % (ref 12–49)
MCH RBC QN AUTO: 30.8 PG (ref 26–34)
MCHC RBC AUTO-ENTMCNC: 33.9 G/DL (ref 30–36.5)
MCV RBC AUTO: 91 FL (ref 80–99)
MONOCYTES # BLD: 0.8 K/UL (ref 0–1)
MONOCYTES NFR BLD: 9 % (ref 5–13)
NEUTS SEG # BLD: 5 K/UL (ref 1.8–8)
NEUTS SEG NFR BLD: 57 % (ref 32–75)
NRBC # BLD: 0 K/UL (ref 0–0.01)
NRBC BLD-RTO: 0 PER 100 WBC
PLATELET # BLD AUTO: 209 K/UL (ref 150–400)
PMV BLD AUTO: 9.8 FL (ref 8.9–12.9)
POTASSIUM SERPL-SCNC: 3.2 MMOL/L (ref 3.5–5.1)
PROT SERPL-MCNC: 6.5 G/DL (ref 6.4–8.2)
RBC # BLD AUTO: 4.12 M/UL (ref 4.1–5.7)
SODIUM SERPL-SCNC: 143 MMOL/L (ref 136–145)
WBC # BLD AUTO: 8.9 K/UL (ref 4.1–11.1)

## 2021-07-06 PROCEDURE — 99285 EMERGENCY DEPT VISIT HI MDM: CPT

## 2021-07-06 PROCEDURE — 36415 COLL VENOUS BLD VENIPUNCTURE: CPT

## 2021-07-06 PROCEDURE — 71260 CT THORAX DX C+: CPT

## 2021-07-06 PROCEDURE — 74011250636 HC RX REV CODE- 250/636: Performed by: EMERGENCY MEDICINE

## 2021-07-06 PROCEDURE — 96374 THER/PROPH/DIAG INJ IV PUSH: CPT

## 2021-07-06 PROCEDURE — 80053 COMPREHEN METABOLIC PANEL: CPT

## 2021-07-06 PROCEDURE — 74011000636 HC RX REV CODE- 636: Performed by: EMERGENCY MEDICINE

## 2021-07-06 PROCEDURE — 84484 ASSAY OF TROPONIN QUANT: CPT

## 2021-07-06 PROCEDURE — 85025 COMPLETE CBC W/AUTO DIFF WBC: CPT

## 2021-07-06 PROCEDURE — 93005 ELECTROCARDIOGRAM TRACING: CPT

## 2021-07-06 PROCEDURE — 96375 TX/PRO/DX INJ NEW DRUG ADDON: CPT

## 2021-07-06 RX ORDER — MORPHINE SULFATE 4 MG/ML
4 INJECTION INTRAVENOUS
Status: COMPLETED | OUTPATIENT
Start: 2021-07-06 | End: 2021-07-06

## 2021-07-06 RX ORDER — ONDANSETRON 2 MG/ML
4 INJECTION INTRAMUSCULAR; INTRAVENOUS
Status: COMPLETED | OUTPATIENT
Start: 2021-07-06 | End: 2021-07-06

## 2021-07-06 RX ORDER — SODIUM CHLORIDE 0.9 % (FLUSH) 0.9 %
5-10 SYRINGE (ML) INJECTION
Status: DISCONTINUED | OUTPATIENT
Start: 2021-07-06 | End: 2021-07-07 | Stop reason: HOSPADM

## 2021-07-06 RX ADMIN — MORPHINE SULFATE 4 MG: 4 INJECTION, SOLUTION INTRAMUSCULAR; INTRAVENOUS at 23:02

## 2021-07-06 RX ADMIN — IOPAMIDOL 100 ML: 755 INJECTION, SOLUTION INTRAVENOUS at 23:53

## 2021-07-06 RX ADMIN — ONDANSETRON 4 MG: 2 INJECTION INTRAMUSCULAR; INTRAVENOUS at 23:00

## 2021-07-07 VITALS
HEIGHT: 72 IN | SYSTOLIC BLOOD PRESSURE: 139 MMHG | BODY MASS INDEX: 30.48 KG/M2 | TEMPERATURE: 98.4 F | DIASTOLIC BLOOD PRESSURE: 70 MMHG | HEART RATE: 79 BPM | RESPIRATION RATE: 21 BRPM | WEIGHT: 225 LBS | OXYGEN SATURATION: 98 %

## 2021-07-07 LAB — TROPONIN I SERPL-MCNC: <0.05 NG/ML

## 2021-07-07 PROCEDURE — 74011000250 HC RX REV CODE- 250: Performed by: EMERGENCY MEDICINE

## 2021-07-07 PROCEDURE — 74011250637 HC RX REV CODE- 250/637: Performed by: EMERGENCY MEDICINE

## 2021-07-07 RX ORDER — LIDOCAINE 50 MG/G
PATCH TOPICAL
Qty: 5 EACH | Refills: 0 | Status: SHIPPED | OUTPATIENT
Start: 2021-07-07 | End: 2022-02-15

## 2021-07-07 RX ORDER — METHOCARBAMOL 750 MG/1
750 TABLET, FILM COATED ORAL 3 TIMES DAILY
Qty: 15 TABLET | Refills: 0 | Status: SHIPPED | OUTPATIENT
Start: 2021-07-07 | End: 2022-02-15

## 2021-07-07 RX ORDER — METHOCARBAMOL 500 MG/1
750 TABLET, FILM COATED ORAL ONCE
Status: COMPLETED | OUTPATIENT
Start: 2021-07-07 | End: 2021-07-07

## 2021-07-07 RX ORDER — LIDOCAINE 4 G/100G
1 PATCH TOPICAL ONCE
Status: DISCONTINUED | OUTPATIENT
Start: 2021-07-07 | End: 2021-07-07 | Stop reason: HOSPADM

## 2021-07-07 RX ADMIN — METHOCARBAMOL 750 MG: 500 TABLET ORAL at 00:39

## 2021-07-07 NOTE — ED NOTES
Emergency Department Nursing Plan of Care       The Nursing Plan of Care is developed from the Nursing assessment and Emergency Department Attending provider initial evaluation. The plan of care may be reviewed in the ED Provider note.     The Plan of Care was developed with the following considerations:   Patient / Family readiness to learn indicated by:verbalized understanding  Persons(s) to be included in education: patient  Barriers to Learning/Limitations:No    Signed     Bart Dixon    7/6/2021   10:55 PM

## 2021-07-07 NOTE — ED TRIAGE NOTES
Pt comes in with c/o L side pain starting yesterday after he hyperextended. Pt also c/o l-sided chest pain with dizziness also starting ysterday. Pt says he was recently released from [facility] for back issues.

## 2021-07-07 NOTE — ED NOTES
Three peripheral IV attempts made, unsuccessful. 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 Charge RN made aware.

## 2021-07-07 NOTE — ED NOTES
Patient has been instructed that they have been given Benadryl IV which contains opioids, benzodiazepines, or other sedating drugs. Patient is aware that they  will need to refrain from driving or operating heavy machinery after taking this medication. Patient also instructed that they need to avoid drinking alcohol and using other products containing opioids, benzodiazepines, or other sedating drugs. Patient verbalized understanding.

## 2021-07-07 NOTE — ED PROVIDER NOTES
EMERGENCY DEPARTMENT HISTORY AND PHYSICAL EXAM      Date: 7/6/2021  Patient Name: Anita Rodgers    History of Presenting Illness     Chief Complaint   Patient presents with    Side Pain       History Provided By: Patient    HPI: Anita Rodgers, 72 y.o. male with PMHx significant for CAD, hypertension, CHF, GERD, diabetes, status post CABG, on Eliquis, who presents with chief complaint of left-sided abdominal pain. Patient reports that while sitting up yesterday he reached over to move a 30 pound weight and felt something \"pop\" in the left side of his abdomen/lower chest. He describes the pain as sharp, radiating into his back. Does report a history of some chronic back problems and tells me he was recently hospitalized at [facility] but was improving until this \"pop\" occurred. He is also been having some intermittent chest pain for over a week and was having this while at [facility]. At that time was diagnosed with an NSTEMI and had a cardiac cath that did not require intervention on 6/14. Has been compliant with his home medications. 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 PCP: Kimmie Goins NP    There are no other complaints, changes, or physical findings at this time.     Current Facility-Administered Medications   Medication Dose Route Frequency Provider Last Rate Last Admin    iopamidoL (ISOVUE-370) 76 % injection 100 mL  100 mL IntraVENous RAD ONCE Jam Johnson MD        sodium chloride (NS) flush 5-10 mL  5-10 mL IntraVENous RAD ONCE Jam Johnson MD         Current Outpatient Medications   Medication Sig Dispense Refill    acetaminophen (TYLENOL) 325 mg tablet TAKE 3 TABLETS BY MOUTH EVERY 8 HOURS FOR 14 DAYS. (GENERIC FOR TYLENOL)      lisinopriL (PRINIVIL, ZESTRIL) 10 mg tablet TAKE ONE TABLET BY MOUTH EVERY DAY      methocarbamoL (ROBAXIN) 500 mg tablet TAKE ONE TABLET BY MOUTH TWICE A DAY      oxyCODONE IR (ROXICODONE) 5 mg immediate release tablet TAKE ONE TABLET BY MOUTH EVERY 6 HOURS AS NEEDED FOR PAIN      polyethylene glycol (MIRALAX) 17 gram/dose powder MIX 17 GRAMS (1 CAPFUL) IN 4 TO 8 OUNCES OF LIQUID AND DRINK ONCE DAILY.  atorvastatin (LIPITOR) 80 mg tablet TAKE ONE TABLET BY MOUTH EVERY DAY      carvediloL (COREG) 3.125 mg tablet TAKE ONE TABLET BY MOUTH EVERY 12 HOURS      omeprazole (PRILOSEC) 40 mg capsule 1 cap(s)      tamsulosin (FLOMAX) 0.4 mg capsule 1 cap(s) (Patient not taking: Reported on 6/28/2021)      Blood-Glucose Meter monitoring kit Patient with Type II DM needs glucometer for blood glucose monitoring. DX code E11.9 1 Kit 0    glucose blood VI test strips (blood glucose test) strip Please measure blood glucose 2 times per day. DX code: E11.9 100 Strip 2    lancets misc Please measure blood glucose 2 times per day. DX Code E11.9 1 Each 2    apixaban (ELIQUIS) 2.5 mg tablet Take 1 Tablet by mouth two (2) times a day. 30 Tablet 0    multivit-min/FA/lycopen/lutein (CENTRUM SILVER MEN PO) Take  by mouth.  albuterol (PROVENTIL HFA, VENTOLIN HFA, PROAIR HFA) 90 mcg/actuation inhaler Take 2 Puffs by inhalation every four (4) hours as needed for Wheezing for up to 360 days. 1 Inhaler 2    Insulin Needles, Disposable, (Comfort EZ Pen Needles) 32 gauge x 5/32\" ndle E11.6 use for insulin SQ injection as directed (Patient not taking: Reported on 6/28/2021) 100 Pen Needle 2    Insulin Syringe-Needle U-100 0.5 mL 30 gauge x 5/16\" syrg Use as directed BID (Patient not taking: Reported on 6/28/2021) 200 Syringe 3    insulin regular (NOVOLIN R, HUMULIN R) 100 unit/mL injection 10 Units by SubCUTAneous route two (2) times a day. (Patient not taking: Reported on 6/28/2021) 3 Vial 2    insulin glargine (Lantus Solostar U-100 Insulin) 100 unit/mL (3 mL) inpn 30 Units by SubCUTAneous route two (2) times a day.  (Patient not taking: Reported on 6/28/2021) 6 Pen 1    isosorbide-hydrALAZINE (BiDiL) 20-37.5 mg per tablet Take 1 Tab by mouth three (3) times daily. (Patient not taking: Reported on 6/28/2021) 90 Tab 0    atorvastatin (LIPITOR) 40 mg tablet Take 1 Tab by mouth daily. (Patient not taking: Reported on 6/28/2021) 30 Tab 0    carvediloL (COREG) 25 mg tablet Take 1 Tab by mouth two (2) times daily (with meals). (Patient not taking: Reported on 6/28/2021) 60 Tab 0    hydroCHLOROthiazide (HYDRODIURIL) 25 mg tablet Take 1 Tab by mouth daily. (Patient not taking: Reported on 6/28/2021) 30 Tab 0    metFORMIN (GLUCOPHAGE) 1,000 mg tablet Take 1 Tab by mouth two (2) times daily (with meals). 60 Tab 0    omeprazole (PRILOSEC) 40 mg capsule Take 1 Cap by mouth daily. 30 Cap 0    tamsulosin (FLOMAX) 0.4 mg capsule Take 1 Cap by mouth daily. 30 Cap 0    furosemide (LASIX) 40 mg tablet Take 1 Tab by mouth daily. (Patient not taking: Reported on 6/28/2021) 30 Tab 0    aspirin 81 mg chewable tablet Take 1 Tab by mouth daily. (Patient not taking: Reported on 6/28/2021) 90 Tab 1    ergocalciferol (ERGOCALCIFEROL) 1,250 mcg (50,000 unit) capsule TK 1 C PO 1 TIME A WK (Patient not taking: Reported on 6/28/2021)       Past History     Past Medical History:  Past Medical History:   Diagnosis Date    CAD (coronary artery disease)     [facility] [address] [facility]     GERD (gastroesophageal reflux disease)     [facility]     Hypertension      Past Surgical History:  Past Surgical History:   Procedure Laterality Date    HX CORONARY ARTERY BYPASS GRAFT      4 way     Family History:  History reviewed. No pertinent family history. Social History:  Social History     Tobacco Use    Smoking status: Current Every Day Smoker     Packs/day: 0.50     Types: Cigarettes    Smokeless tobacco: Never Used   Substance Use Topics    Alcohol use: No    Drug use: Not Currently     Allergies:  No Known Allergies  Review of Systems   Review of Systems   Constitutional: Negative for chills and fever.    HENT: Negative for congestion, rhinorrhea and sore throat. Respiratory: Negative for cough and shortness of breath. Cardiovascular: Negative for chest pain. Gastrointestinal: Positive for abdominal pain. Negative for nausea and vomiting. Genitourinary: Negative for dysuria and urgency. Skin: Negative for rash. Neurological: Negative for dizziness, light-headedness and headaches. All other systems reviewed and are negative. Physical Exam   Physical Exam  Vitals and nursing note reviewed. Constitutional:       General: He is not in acute distress. Appearance: He is well-developed. HENT:      Head: Normocephalic and atraumatic. Eyes:      Conjunctiva/sclera: Conjunctivae normal.      Pupils: Pupils are equal, round, and reactive to light. Cardiovascular:      Rate and Rhythm: Normal rate and regular rhythm. Pulmonary:      Effort: Pulmonary effort is normal. No respiratory distress. Breath sounds: Normal breath sounds. No stridor. Abdominal:      General: There is no distension. Palpations: Abdomen is soft. Tenderness: There is abdominal tenderness. Musculoskeletal:         General: Normal range of motion. Cervical back: Normal range of motion. Skin:     General: Skin is warm and dry. Neurological:      Mental Status: He is alert and oriented to person, place, and time.        Diagnostic Study Results   Labs -     Recent Results (from the past 12 hour(s))   CBC WITH AUTOMATED DIFF    Collection Time: 07/06/21 10:54 PM   Result Value Ref Range    WBC 8.9 4.1 - 11.1 K/uL    RBC 4.12 4.10 - 5.70 M/uL    HGB 12.7 12.1 - 17.0 g/dL    HCT 37.5 36.6 - 50.3 %    MCV 91.0 80.0 - 99.0 FL    MCH 30.8 26.0 - 34.0 PG    MCHC 33.9 30.0 - 36.5 g/dL    RDW 13.4 11.5 - 14.5 %    PLATELET 127 470 - 125 K/uL    MPV 9.8 8.9 - 12.9 FL    NRBC 0.0 0  WBC    ABSOLUTE NRBC 0.00 0.00 - 0.01 K/uL    NEUTROPHILS 57 32 - 75 %    LYMPHOCYTES 30 12 - 49 %    MONOCYTES 9 5 - 13 %    EOSINOPHILS 3 0 - 7 %    BASOPHILS 0 0 - 1 % IMMATURE GRANULOCYTES 1 (H) 0.0 - 0.5 %    ABS. NEUTROPHILS 5.0 1.8 - 8.0 K/UL    ABS. LYMPHOCYTES 2.7 0.8 - 3.5 K/UL    ABS. MONOCYTES 0.8 0.0 - 1.0 K/UL    ABS. EOSINOPHILS 0.3 0.0 - 0.4 K/UL    ABS. BASOPHILS 0.0 0.0 - 0.1 K/UL    ABS. IMM. GRANS. 0.1 (H) 0.00 - 0.04 K/UL    DF AUTOMATED     METABOLIC PANEL, COMPREHENSIVE    Collection Time: 07/06/21 10:54 PM   Result Value Ref Range    Sodium 143 136 - 145 mmol/L    Potassium 3.2 (L) 3.5 - 5.1 mmol/L    Chloride 107 97 - 108 mmol/L    CO2 30 21 - 32 mmol/L    Anion gap 6 5 - 15 mmol/L    Glucose 101 (H) 65 - 100 mg/dL    BUN 6 6 - 20 MG/DL    Creatinine 0.84 0.70 - 1.30 MG/DL    BUN/Creatinine ratio 7 (L) 12 - 20      GFR est AA >60 >60 ml/min/1.73m2    GFR est non-AA >60 >60 ml/min/1.73m2    Calcium 8.5 8.5 - 10.1 MG/DL    Bilirubin, total 0.7 0.2 - 1.0 MG/DL    ALT (SGPT) 22 12 - 78 U/L    AST (SGOT) 24 15 - 37 U/L    Alk. phosphatase 78 45 - 117 U/L    Protein, total 6.5 6.4 - 8.2 g/dL    Albumin 2.9 (L) 3.5 - 5.0 g/dL    Globulin 3.6 2.0 - 4.0 g/dL    A-G Ratio 0.8 (L) 1.1 - 2.2         Radiologic Studies -   CT CHEST ABD PELV W CONT   Final Result      1. Trace bilateral pleural effusions and bilateral atelectasis without pulmonary   edema, focal consolidation, or pneumothorax. 2. No acute process in the abdomen or pelvis. 3. Superior endplate compression fracture of T12 may be chronic. No results found. Medical Decision Making   I am the first provider for this patient. I reviewed the vital signs, available nursing notes, past medical history, past surgical history, family history and social history. Vital Signs-Reviewed the patient's vital signs.   Patient Vitals for the past 12 hrs:   Temp Pulse Resp BP SpO2   07/06/21 2214 -- -- -- -- 97 %   07/06/21 2144 98.4 °F (36.9 °C) 79 22 (!) 148/79 97 %       Pulse Oximetry Analysis - 98% on ra    Cardiac Monitor:   Rate: 79 bpm  Rhythm: Normal Sinus Rhythm      ED EKG interpretation:  Rhythm: normal sinus rhythm and PAC's; and regular . Rate (approx.): 75; Axis: left axis deviation; P wave: normal; QRS interval: prolonged; ST/T wave: normal; Other findings: borderline ekg. This EKG was interpreted by IVY Valdez MD,ED Provider. Records Reviewed: Nursing Notes and Old Medical Records    Provider Notes (Medical Decision Making):   Patient presents with a chief complaint of left sided abdominal and lower rib pain after feeling a \"pop. \"  Suspect likely musculoskeletal pain but patient is quite tender on exam and is on anticoagulation so we will check CT imaging to rule out spontaneous hematoma or muscular bleed. We will do some basic lab work, troponin, check EKG. ED Course:   Initial assessment performed. The patients presenting problems have been discussed, and they are in agreement with the care plan formulated and outlined with them. I have encouraged them to ask questions as they arise throughout their visit. ED Course as of Jul 07 1016   Tue Jul 06, 2021   2344 Patient signed out to Dr. Caro Rubio. Disposition pending CT scans. [JULIÁN]   Wed Jul 07, 2021   8900 Patient reassessed. Signout received from Dr. Mati Luna. Reviewed CT imaging. No acute injury or acute process. Plan for muscle relaxers and lidocaine patch. Discussed follow-up with PCP and cardiology as needed. [HW]      ED Course User Index  [HW] MD Peggy Price MD       Procedures:  Procedures    Critical Care:  none    Disposition:  Discharge Note:  The patient has been re-evaluated and is ready for discharge. Reviewed available results with patient. Counseled patient on diagnosis and care plan. Patient has expressed understanding, and all questions have been answered. Patient agrees with plan and agrees to follow up as recommended, or to return to the ED if their symptoms worsen. Discharge instructions have been provided and explained to the patient, along with reasons to return to the ED. PLAN:  1. Discharge Medication List as of 7/7/2021 12:28 AM      START taking these medications    Details   !! methocarbamoL (Robaxin-750) 750 mg tablet Take 1 Tablet by mouth three (3) times daily. , Normal, Disp-15 Tablet, R-0      lidocaine (Lidoderm) 5 % Apply patch to the affected area for 12 hours a day and remove for 12 hours a day., Normal, Disp-5 Each, R-0       !! - Potential duplicate medications found. Please discuss with provider. CONTINUE these medications which have NOT CHANGED    Details   acetaminophen (TYLENOL) 325 mg tablet TAKE 3 TABLETS BY MOUTH EVERY 8 HOURS FOR 14 DAYS. (GENERIC FOR TYLENOL), Historical Med      lisinopriL (PRINIVIL, ZESTRIL) 10 mg tablet TAKE ONE TABLET BY MOUTH EVERY DAY, Historical Med      !! methocarbamoL (ROBAXIN) 500 mg tablet TAKE ONE TABLET BY MOUTH TWICE A DAY, Historical Med      oxyCODONE IR (ROXICODONE) 5 mg immediate release tablet TAKE ONE TABLET BY MOUTH EVERY 6 HOURS AS NEEDED FOR PAIN, Historical Med      polyethylene glycol (MIRALAX) 17 gram/dose powder MIX 17 GRAMS (1 CAPFUL) IN 4 TO 8 OUNCES OF LIQUID AND DRINK ONCE DAILY. , Historical Med      !! atorvastatin (LIPITOR) 80 mg tablet TAKE ONE TABLET BY MOUTH EVERY DAY, Historical Med      !! carvediloL (COREG) 3.125 mg tablet TAKE ONE TABLET BY MOUTH EVERY 12 HOURS, Historical Med      !! omeprazole (PRILOSEC) 40 mg capsule 1 cap(s), Historical Med      !! tamsulosin (FLOMAX) 0.4 mg capsule 1 cap(s), Historical Med      Blood-Glucose Meter monitoring kit Patient with Type II DM needs glucometer for blood glucose monitoring. DX code E11.9, Normal, Disp-1 Kit, R-0      glucose blood VI test strips (blood glucose test) strip Please measure blood glucose 2 times per day. DX code: E11.9, Normal, Disp-100 Strip, R-2      lancets misc Please measure blood glucose 2 times per day. DX Code E11.9, Normal, Disp-1 Each, R-2      apixaban (ELIQUIS) 2.5 mg tablet Take 1 Tablet by mouth two (2) times a day. , Normal, Disp-30 Tablet, R-0      multivit-min/FA/lycopen/lutein (CENTRUM SILVER MEN PO) Take  by mouth., Historical Med      albuterol (PROVENTIL HFA, VENTOLIN HFA, PROAIR HFA) 90 mcg/actuation inhaler Take 2 Puffs by inhalation every four (4) hours as needed for Wheezing for up to 360 days. , Normal, Disp-1 Inhaler,R-2      Insulin Needles, Disposable, (Comfort EZ Pen Needles) 32 gauge x 5/32\" ndle E11.6 use for insulin SQ injection as directed, Normal, Disp-100 Pen Needle,R-2      Insulin Syringe-Needle U-100 0.5 mL 30 gauge x 5/16\" syrg Use as directed BID, Normal, Disp-200 Syringe,R-3      insulin regular (NOVOLIN R, HUMULIN R) 100 unit/mL injection 10 Units by SubCUTAneous route two (2) times a day., Normal, Disp-3 Vial,R-2      insulin glargine (Lantus Solostar U-100 Insulin) 100 unit/mL (3 mL) inpn 30 Units by SubCUTAneous route two (2) times a day., Normal, Disp-6 Pen,R-1      isosorbide-hydrALAZINE (BiDiL) 20-37.5 mg per tablet Take 1 Tab by mouth three (3) times daily. , Normal, Disp-90 Tab,R-0      !! atorvastatin (LIPITOR) 40 mg tablet Take 1 Tab by mouth daily. , Normal, Disp-30 Tab,R-0      !! carvediloL (COREG) 25 mg tablet Take 1 Tab by mouth two (2) times daily (with meals). , Normal, Disp-60 Tab,R-0      hydroCHLOROthiazide (HYDRODIURIL) 25 mg tablet Take 1 Tab by mouth daily. , Normal, Disp-30 Tab,R-0      metFORMIN (GLUCOPHAGE) 1,000 mg tablet Take 1 Tab by mouth two (2) times daily (with meals). , Normal, Disp-60 Tab,R-0      !! omeprazole (PRILOSEC) 40 mg capsule Take 1 Cap by mouth daily. , Normal, Disp-30 Cap,R-0      !! tamsulosin (FLOMAX) 0.4 mg capsule Take 1 Cap by mouth daily. , Normal, Disp-30 Cap,R-0      furosemide (LASIX) 40 mg tablet Take 1 Tab by mouth daily. , Normal, Disp-30 Tab,R-0      aspirin 81 mg chewable tablet Take 1 Tab by mouth daily. , Normal, Disp-90 Tab,R-1      ergocalciferol (ERGOCALCIFEROL) 1,250 mcg (50,000 unit) capsule TK 1 C PO 1 TIME A WK, Historical Med       !! - Potential duplicate medications found. Please discuss with provider. 2.   Follow-up Information     Follow up With Specialties Details Why [address] [facility] EMERGENCY DEPT Emergency Medicine  As needed, If symptoms worsen [address] NP Nurse Practitioner  As needed, If symptoms worsen [id number]  [phone]          Return to ED if worse     Diagnosis     Clinical Impression:   1. Atypical chest pain            Please note that this dictation was completed with [employer], the computer voice recognition software. Quite often unanticipated grammatical, syntax, homophones, and other interpretive errors are inadvertently transcribed by the computer software. Please disregard these errors.   Please excuse any errors that have escaped final proofreading

## 2021-07-07 NOTE — ED NOTES
Patient (s) given copy of dc instructions and 2 script(s). Patient (s) verbalized understanding of instructions and script (s). Patient given a current medication reconciliation form and verbalized understanding of their medications. Patient (s) verbalized understanding of the importance of discussing medications with  his or her physician or clinic they will be following up with. Patient alert and oriented and in no acute distress. Patient discharged home ambulatory. Pt wheeled to ED lobby via w/c accompanied by PCT. Round trip established for pt upon d/c.

## 2021-07-07 NOTE — PROGRESS NOTES
Results were received and reviewed. Patient had a follow up appointment but did not come into the office to discuss test results and plan of action. Will awit a response from patient.

## 2021-07-07 NOTE — ED NOTES
Pt updated on plan of care, verbalizes understanding. Pt resting upright on the stretcher in a position of comfort and in no acute distress. Pt A and O x 4. RR even and unlabored. Skin warm and dry. Call bell in reach. Additional warm blanket provided and lights remain dimmed for comfort.

## 2021-07-08 LAB
ATRIAL RATE: 75 BPM
CALCULATED P AXIS, ECG09: 56 DEGREES
CALCULATED R AXIS, ECG10: -66 DEGREES
CALCULATED T AXIS, ECG11: 32 DEGREES
DIAGNOSIS, 93000: NORMAL
P-R INTERVAL, ECG05: 142 MS
Q-T INTERVAL, ECG07: 420 MS
QRS DURATION, ECG06: 138 MS
QTC CALCULATION (BEZET), ECG08: 469 MS
VENTRICULAR RATE, ECG03: 75 BPM

## 2021-07-30 ENCOUNTER — OFFICE VISIT (OUTPATIENT)
Dept: INTERNAL MEDICINE CLINIC | Age: 71
End: 2021-07-30
Payer: MEDICAID

## 2021-07-30 VITALS
OXYGEN SATURATION: 99 % | SYSTOLIC BLOOD PRESSURE: 109 MMHG | HEART RATE: 76 BPM | HEIGHT: 72 IN | RESPIRATION RATE: 16 BRPM | WEIGHT: 221 LBS | BODY MASS INDEX: 29.93 KG/M2 | DIASTOLIC BLOOD PRESSURE: 77 MMHG | TEMPERATURE: 98.2 F

## 2021-07-30 DIAGNOSIS — I25.119 CORONARY ARTERY DISEASE WITH ANGINA PECTORIS, UNSPECIFIED VESSEL OR LESION TYPE, UNSPECIFIED WHETHER NATIVE OR TRANSPLANTED HEART (HCC): ICD-10-CM

## 2021-07-30 DIAGNOSIS — Z09 HOSPITAL DISCHARGE FOLLOW-UP: Primary | ICD-10-CM

## 2021-07-30 DIAGNOSIS — E11.8 TYPE II DIABETES MELLITUS WITH COMPLICATION (HCC): ICD-10-CM

## 2021-07-30 DIAGNOSIS — R42 DIZZINESS OF UNKNOWN ETIOLOGY: ICD-10-CM

## 2021-07-30 DIAGNOSIS — I10 ESSENTIAL HYPERTENSION: ICD-10-CM

## 2021-07-30 DIAGNOSIS — E11.51 DIABETIC PERIPHERAL VASCULAR DISORDER (HCC): ICD-10-CM

## 2021-07-30 DIAGNOSIS — R53.83 FATIGUE, UNSPECIFIED TYPE: ICD-10-CM

## 2021-07-30 DIAGNOSIS — F14.11 HISTORY OF COCAINE ABUSE (HCC): ICD-10-CM

## 2021-07-30 LAB
COMMENT, HOLDF: NORMAL
GLUCOSE POC: 275 MG/DL
SAMPLES BEING HELD,HOLD: NORMAL

## 2021-07-30 PROCEDURE — 99215 OFFICE O/P EST HI 40 MIN: CPT | Performed by: NURSE PRACTITIONER

## 2021-07-30 PROCEDURE — 82962 GLUCOSE BLOOD TEST: CPT | Performed by: NURSE PRACTITIONER

## 2021-07-30 RX ORDER — SPIRONOLACTONE 25 MG/1
TABLET ORAL
COMMUNITY
Start: 2021-07-19 | End: 2021-08-26 | Stop reason: SDUPTHER

## 2021-07-30 RX ORDER — AMITRIPTYLINE HYDROCHLORIDE 25 MG/1
TABLET, FILM COATED ORAL
COMMUNITY
End: 2022-02-15 | Stop reason: ALTCHOICE

## 2021-07-30 RX ORDER — INSULIN GLARGINE 100 [IU]/ML
INJECTION, SOLUTION SUBCUTANEOUS
COMMUNITY
Start: 2021-05-22 | End: 2021-09-10 | Stop reason: DRUGHIGH

## 2021-07-30 RX ORDER — IBUPROFEN 200 MG
1 TABLET ORAL EVERY 24 HOURS
COMMUNITY

## 2021-07-30 RX ORDER — CYCLOBENZAPRINE HCL 10 MG
TABLET ORAL
COMMUNITY
Start: 2021-07-19 | End: 2022-02-15

## 2021-07-30 NOTE — PATIENT INSTRUCTIONS
Learning About Coronary Artery Disease (CAD)  What is coronary artery disease? Coronary artery disease is a condition that occurs when plaque builds up in the arteries that bring oxygen-rich blood to your heart. Plaque is a fatty substance made of cholesterol, calcium, and other substances in the blood. This process is called hardening of the arteries, or atherosclerosis. What happens when you have coronary artery disease? · Plaque may narrow the coronary arteries. Narrowed arteries cause poor blood flow. This can lead to angina symptoms such as chest pain or discomfort. If blood flow is completely blocked, you could have a heart attack. · You can slow and reduce the risk of future problems by making changes in your lifestyle. These include quitting smoking and eating heart-healthy foods. · Treatment, along with changes in your lifestyle, can help you live a longer and healthier life. How can you prevent coronary artery disease? · Do not smoke. It may be the best thing you can do to prevent coronary artery disease. If you need help quitting, talk to your doctor about stop-smoking programs and medicines. These can increase your chances of quitting for good. · Be active. Try to do moderate activity at least 2½ hours a week. Or try to do vigorous activity at least 1¼ hours a week. You may want to walk or try other activities, such as running, swimming, cycling, or playing tennis or team sports. · Eat heart-healthy foods. Eat more fruits and vegetables and less food that contains saturated and trans fats. Limit alcohol, sodium, and sweets. · Stay at a healthy weight. Lose weight if you need to. · Manage other health problems such as diabetes, high blood pressure, and high cholesterol. How is coronary artery disease treated? · Your doctor will suggest that you make lifestyle changes. For example, your doctor may ask you to eat healthy foods, quit smoking, lose extra weight, and be more active.   · You will take medicines that help prevent a heart attack. · Your doctor may suggest a procedure to open narrowed or blocked arteries. This is called angioplasty. Or your doctor may suggest using healthy blood vessels to create detours around narrowed or blocked arteries. This is called bypass surgery. Follow-up care is a key part of your treatment and safety. Be sure to make and go to all appointments, and call your doctor if you are having problems. It's also a good idea to know your test results and keep a list of the medicines you take. Where can you learn more? Go to http://www.gray.com/  Enter C643 in the search box to learn more about \"Learning About Coronary Artery Disease (CAD). \"  Current as of: August 31, 2020               Content Version: 12.8  © 2006-2021 [employer]. Care instructions adapted under license by [employer] (which disclaims liability or warranty for this information). If you have questions about a medical condition or this instruction, always ask your healthcare professional. [place] [address] any warranty or liability for your use of this information. Counting Carbohydrates: Care Instructions  Your Care Instructions     You don't have to eat special foods when you have diabetes. You just have to be careful to eat healthy foods. Carbohydrates (carbs) raise blood sugar higher and quicker than any other nutrient. Carbs are found in desserts, breads and cereals, and fruit. They're also in starchy vegetables. These include potatoes, corn, and grains such as rice and pasta. Carbs are also in milk and yogurt. The more carbs you eat at one time, the higher your blood sugar will rise. Spreading carbs all through the day helps keep your blood sugar levels within your target range. Counting carbs is one of the best ways to keep your blood sugar under control.   If you use insulin, counting carbs helps you match the right amount of insulin to the number of grams of carbs in a meal. Then you can change your diet and insulin dose as needed. Testing your blood sugar several times a day can help you learn how carbs affect your blood sugar. A registered dietitian or certified diabetes educator can help you plan meals and snacks. Follow-up care is a key part of your treatment and safety. Be sure to make and go to all appointments, and call your doctor if you are having problems. It's also a good idea to know your test results and keep a list of the medicines you take. How can you care for yourself at home? Know your daily amount of carbohydrates  Your daily amount depends on several things, such as your weight, how active you are, which diabetes medicines you take, and what your goals are for your blood sugar levels. A registered dietitian or certified diabetes educator can help you plan how many carbs to include in each meal and snack. For most adults, a guideline for the daily amount of carbs is:  · 45 to 60 grams at each meal. That's about the same as 3 to 4 carbohydrate servings. · 15 to 20 grams at each snack. That's about the same as 1 carbohydrate serving. Count carbs  Counting carbs lets you know how much rapid-acting insulin to take before you eat. If you use an insulin pump, you get a constant rate of insulin during the day. So the pump must be programmed at meals. This gives you extra insulin to cover the rise in blood sugar after meals. If you take insulin:  · Learn your own insulin-to-carb ratio. You and your diabetes health professional will figure out the ratio. You can do this by testing your blood sugar after meals. For example, you may need a certain amount of insulin for every 15 grams of carbs. · Add up the carb grams in a meal. Then you can figure out how many units of insulin to take based on your insulin-to-carb ratio. · Exercise lowers blood sugar.  You can use less insulin than you would if you were not doing exercise. Keep in mind that timing matters. If you exercise within 1 hour after a meal, your body may need less insulin for that meal than it would if you exercised 3 hours after the meal. Test your blood sugar to find out how exercise affects your need for insulin. If you do or don't take insulin:  · Look at labels on packaged foods. This can tell you how many carbs are in a serving. You can also use guides from the American Diabetes Association. · Be aware of portions, or serving sizes. If a package has two servings and you eat the whole package, you need to double the number of grams of carbohydrate listed for one serving. · Protein, fat, and fiber do not raise blood sugar as much as carbs do. If you eat a lot of these nutrients in a meal, your blood sugar will rise more slowly than it would otherwise. Eat from all food groups  · Eat at least three meals a day. · Plan meals to include food from all the food groups. The food groups include grains, fruits, dairy, proteins, and vegetables. · Talk to your dietitian or diabetes educator about ways to add limited amounts of sweets into your meal plan. · If you drink alcohol, talk to your doctor. It may not be recommended when you are taking certain diabetes medicines. Where can you learn more? Go to http://www.gray.com/  Enter G703 in the search box to learn more about \"Counting Carbohydrates: Care Instructions. \"  Current as of: August 31, 2020               Content Version: 12.8  © 4290-0055 [employer]. Care instructions adapted under license by [employer] (which disclaims liability or warranty for this information). If you have questions about a medical condition or this instruction, always ask your healthcare professional. [place] [address] any warranty or liability for your use of this information.          High Blood Pressure: Care Instructions  Overview     It's normal for blood pressure to go up and down throughout the day. But if it stays up, you have high blood pressure. Another name for high blood pressure is hypertension. Despite what a lot of people think, high blood pressure usually doesn't cause headaches or make you feel dizzy or lightheaded. It usually has no symptoms. But it does increase your risk of stroke, heart attack, and other problems. You and your doctor will talk about your risks of these problems based on your blood pressure. Your doctor will give you a goal for your blood pressure. Your goal will be based on your health and your age. Lifestyle changes, such as eating healthy and being active, are always important to help lower blood pressure. You might also take medicine to reach your blood pressure goal.  Follow-up care is a key part of your treatment and safety. Be sure to make and go to all appointments, and call your doctor if you are having problems. It's also a good idea to know your test results and keep a list of the medicines you take. How can you care for yourself at home? Medical treatment  · If you stop taking your medicine, your blood pressure will go back up. You may take one or more types of medicine to lower your blood pressure. Be safe with medicines. Take your medicine exactly as prescribed. Call your doctor if you think you are having a problem with your medicine. · Talk to your doctor before you start taking aspirin every day. Aspirin can help certain people lower their risk of a heart attack or stroke. But taking aspirin isn't right for everyone, because it can cause serious bleeding. · See your doctor regularly. You may need to see the doctor more often at first or until your blood pressure comes down. · If you are taking blood pressure medicine, talk to your doctor before you take decongestants or anti-inflammatory medicine, such as ibuprofen. Some of these medicines can raise blood pressure.   · Learn how to check your blood pressure at home.  Lifestyle changes  · Stay at a healthy weight. This is especially important if you put on weight around the waist. Losing even 10 pounds can help you lower your blood pressure. · If your doctor recommends it, get more exercise. Walking is a good choice. Bit by bit, increase the amount you walk every day. Try for at least 30 minutes on most days of the week. You also may want to swim, bike, or do other activities. · Avoid or limit alcohol. Talk to your doctor about whether you can drink any alcohol. · Try to limit how much sodium you eat to less than 2,300 milligrams (mg) a day. Your doctor may ask you to try to eat less than 1,500 mg a day. · Eat plenty of fruits (such as bananas and oranges), vegetables, legumes, whole grains, and low-fat dairy products. · Lower the amount of saturated fat in your diet. Saturated fat is found in animal products such as milk, cheese, and meat. Limiting these foods may help you lose weight and also lower your risk for heart disease. · Do not smoke. Smoking increases your risk for heart attack and stroke. If you need help quitting, talk to your doctor about stop-smoking programs and medicines. These can increase your chances of quitting for good. When should you call for help? Call  911 anytime you think you may need emergency care. This may mean having symptoms that suggest that your blood pressure is causing a serious heart or blood vessel problem. Your blood pressure may be over 180/120. For example, call 911 if:    · You have symptoms of a heart attack. These may include:  ? Chest pain or pressure, or a strange feeling in the chest.  ? Sweating. ? Shortness of breath. ? Nausea or vomiting. ? Pain, pressure, or a strange feeling in the back, neck, jaw, or upper belly or in one or both shoulders or arms. ? Lightheadedness or sudden weakness. ? A fast or irregular heartbeat.     · You have symptoms of a stroke.  These may include:  ? Sudden numbness, tingling, weakness, or loss of movement in your face, arm, or leg, especially on only one side of your body. ? Sudden vision changes. ? Sudden trouble speaking. ? Sudden confusion or trouble understanding simple statements. ? Sudden problems with walking or balance. ? A sudden, severe headache that is different from past headaches.     · You have severe back or belly pain. Do not wait until your blood pressure comes down on its own. Get help right away. Call your doctor now or seek immediate care if:    · Your blood pressure is much higher than normal (such as 180/120 or higher), but you don't have symptoms.     · You think high blood pressure is causing symptoms, such as:  ? Severe headache.  ? Blurry vision. Watch closely for changes in your health, and be sure to contact your doctor if:    · Your blood pressure measures higher than your doctor recommends at least 2 times. That means the top number is higher or the bottom number is higher, or both.     · You think you may be having side effects from your blood pressure medicine. Where can you learn more? Go to http://www.gray.com/  Enter [id number] in the search box to learn more about \"High Blood Pressure: Care Instructions. \"  Current as of: August 31, 2020               Content Version: 12.8  © 2006-2021 [employer]. Care instructions adapted under license by [employer] (which disclaims liability or warranty for this information). If you have questions about a medical condition or this instruction, always ask your healthcare professional. [place] [address] any warranty or liability for your use of this information. Codependency: Care Instructions  Your Care Instructions  Codependency happens when you are greatly affected by a loved one's or another family member's substance use disorder. You may feel that you can control the actions of another person through your will power.  Since this cannot be done, you feel hopeless, betrayed, or angry. Substance use disorder is a disease. It can range from mild to severe. Moderate to severe substance use disorder is sometimes called addiction. A person with an substance use disorder is not choosing a drug or alcohol instead of friends or family. The person needs professional help to deal with the substance use disorder. The best thing you can do is to help the person get the help he or she needs. Also, it is very important for you to take care of yourself. Making sure you get the understanding and respect you need will help you stay healthy in both physical and emotional ways. Follow-up care is a key part of your treatment and safety. Be sure to make and go to all appointments, and call your doctor if you are having problems. It's also a good idea to know your test results and keep a list of the medicines you take. How can you care for yourself at home? · Learn all you can about codependency. Being informed can help you deal with the problem. · Do not blame yourself for your family member's condition. · Find a counselor you like and trust. Talk openly and honestly about your problems. Be willing to make some changes. · Go to all counseling sessions. Do not skip any just because you are feeling better. · Talk to friends and family for emotional support. · Make a plan with all family members about how to take care of your loved one when symptoms of substance use disorder are bad. · Do not focus attention only on the family member who has the problem. · Get enough rest. When you are too tired, it can be hard to cope with even small problems. · Eat a healthy, balanced diet to help prevent illness. · Get at least 30 minutes of exercise on most days of the week. Walking is a good choice. You also may want to do other activities, such as running, swimming, cycling, or playing tennis or team sports.  Exercise can help you relieve stress and feel better. · Stay active. Try to do the things you usually enjoy, even if you do not feel like doing them. · Join a support group for family members, such as [employer]. Talking with other people who are going through the same things can help. When should you call for help? Watch closely for changes in your health, and be sure to contact your doctor if:    · You cannot concentrate or are easily confused.     · You have trouble taking care of yourself.     · You cannot go to your counseling sessions.     · You feel sad or depressed. Where can you learn more? Go to http://www.gray.com/  Enter [id number] in the search box to learn more about \"Codependency: Care Instructions. \"  Current as of: September 23, 2020               Content Version: 12.8  © 2006-2021 Healthwise, Incorporated. Care instructions adapted under license by [employer] (which disclaims liability or warranty for this information). If you have questions about a medical condition or this instruction, always ask your healthcare professional. [address] any warranty or liability for your use of this information.

## 2021-07-30 NOTE — LETTER
NOTIFICATION RETURN TO WORK / SCHOOL    7/30/2021 10:55 AM    Mr. Doris Nunes  [facility] [id number]      To Whom It May Concern:    Doris Nunes is currently under the care of [address] [place] [address]. He was hospitalized for acute care concerns and requires additional time to rest for proper recovery. Please allow the patient to take breaks during the day as needed to allow him to recover for next 7 days. If there are questions or concerns please have the patient contact our office.         Sincerely,      Kaitlynn Tai NP

## 2021-07-30 NOTE — PROGRESS NOTES
1. Have you been to the ER, urgent care clinic since your last visit? Hospitalized since your last visit? YES/SOB--Side pain//dizziness//[facility]    2. Have you seen or consulted any other health care providers outside of the [address] [place] [address] since your last visit? Include any pap smears or colon screening. no        verbal order given for needed amb poc labs.     3 most recent PHQ Screens 7/30/2021   Little interest or pleasure in doing things Not at all   Feeling down, depressed, irritable, or hopeless Not at all   Total Score PHQ 2 0

## 2021-07-30 NOTE — PROGRESS NOTES
Subjective:  Kimberly Vail is a 72 y.o. male and presents with Hypertension, DM II, Hyperlipidemia and CAD for [facility]. He is also requesting a letter to allow him to get some rest throughout the day. Per the patient he is at a half-way house and he is required to get up early and attend various classes and meetings, He says that he has felt exhausted and dizzy since coming home from the hospital and needs to rest.     Patient was given a letter requesting that he be allowed to get additional rest over the next 7 days. He was also referred to Neurology for further evaluation of dizziness. Patient admits to cocaine and alcohol abuse was educate on the importance of quitting and possible complications to his health if he continues to use. Hypertension Review:  The patient has essential hypertension which is now well managed, 109/77 at today's visit. Diet and Lifestyle: generally follows a  low sodium diet, exercises sporadically  Home BP Monitoring: is not measured at home. Pertinent ROS: taking medications as instructed, no medication side effects noted, no TIA's, no chest pain on exertion, no dyspnea on exertion, no swelling of ankles.      Diabetes Mellitus Review:  Patient is presenting for evaluation and follow up for Diabetes Mellitus Type II. Diabetic ROS - medication compliance: compliant most of the time, diabetic diet compliance: compliant most of the time,   Known diabetic complications: none  Cardiovascular risk factors: family history, dyslipidemia, diabetes mellitus, obesity, hypertension  Current diabetic medications include: Metformin and  insulin   Eye exam current (within one year): no  Weight trend: stable  Prior visit with dietician: no  Current diet: \"healthy\" diet  in general  Current exercise: walking  Any episodes of hypoglycemia?  No     Hyperlipidemia Review:  Patient presents for follow up and evaluation of lipids. A repeat fasting lipid profile was done at today's office visit. Patient claims that he is taking medications as prescribed and is adhering to dietary recommendations. Patient states that he does exercise but not regularly. He was advised of exercise recommendations of 150 minutes a week. The patient does not use medications that may worsen dyslipidemias (corticosteroids, progestins, anabolic steroids, amiodarone, cyclosporine, olanzapine).       Coronary Disease Review:  Patient denies having any chest pain today. There has not been the need to use NTG. Previous cardiac testing has included: Electrocardiogram (EKG), Echocardiogram.    Review of Systems  Constitutional: negative for fevers, chills, anorexia and weight loss  Eyes:   negative for visual disturbance and irritation  ENT:   negative for tinnitus,sore throat,nasal congestion,ear pains. hoarseness  Respiratory:  negative for cough, hemoptysis, dyspnea,wheezing  CV:   negative for chest pain, palpitations, lower extremity edema  GI:   negative for nausea, vomiting, diarrhea, abdominal pain,melena  Endo:               negative for polyuria,polydipsia,polyphagia,heat intolerance  Genitourinary: negative for frequency, dysuria and hematuria  Integument:  negative for rash and pruritus  Hematologic:  negative for easy bruising and gum/nose bleeding  Musculoskel: negative for myalgias, arthralgias, back pain, muscle weakness, joint pain  Neurological:  negative for headaches, dizziness, vertigo, memory problems and gait   Behavl/Psych: negative for feelings of anxiety, depression, mood changes    Past Medical History:   Diagnosis Date    CAD (coronary artery disease)     [facility]     GERD (gastroesophageal reflux disease)     Heart failure ([facility]     Hypertension      Past Surgical History:   Procedure Laterality Date    HX CORONARY ARTERY BYPASS GRAFT      [address] way     Social History     Socioeconomic History    Marital status:      Spouse name: Not on file    Number of children: Not on file    Years of education: Not on file    Highest education level: Not on file   Tobacco Use    Smoking status: Current Every Day Smoker     Packs/day: 0.50     Types: Cigarettes    Smokeless tobacco: Never Used   Substance and Sexual Activity    Alcohol use: No    Drug use: Not Currently    Sexual activity: Not Currently     Social Determinants of Health     Financial Resource Strain:     Difficulty of Paying Living Expenses:    Food Insecurity:     Worried About Running Out of Food in the Last Year:     Ran Out of Food in the Last Year:    Transportation Needs:     Lack of Transportation (Medical):  Lack of Transportation (Non-Medical):    Physical Activity:     Days of Exercise per Week:     Minutes of Exercise per Session:    Stress:     Feeling of Stress :    Social Connections:     Frequency of Communication with Friends and Family:     Frequency of Social Gatherings with Friends and Family:     Attends Church Services:     Active Member of Clubs or Organizations:     Attends Club or Organization Meetings:     Marital Status:      History reviewed. No pertinent family history. Current Outpatient Medications   Medication Sig Dispense Refill    Lantus U-100 Insulin 100 unit/mL injection ADMINISTER 30 UNITS UNDER THE SKIN TWICE DAILY      amitriptyline (ELAVIL) 25 mg tablet Take  by mouth nightly.  nicotine (NICODERM CQ) 21 mg/24 hr 1 Patch by TransDERmal route every twenty-four (24) hours.  spironolactone (ALDACTONE) 25 mg tablet TAKE 1 TABLET BY MOUTH DAILY      methocarbamoL (Robaxin-750) 750 mg tablet Take 1 Tablet by mouth three (3) times daily.  15 Tablet 0    acetaminophen (TYLENOL) 325 mg tablet TAKE 3 TABLETS BY MOUTH EVERY 8 HOURS FOR 14 DAYS. (GENERIC FOR TYLENOL)      lisinopriL (PRINIVIL, ZESTRIL) 10 mg tablet TAKE ONE TABLET BY MOUTH EVERY DAY      methocarbamoL (ROBAXIN) 500 mg tablet TAKE ONE TABLET BY MOUTH TWICE A DAY      polyethylene glycol (MIRALAX) 17 gram/dose powder MIX 17 GRAMS (1 CAPFUL) IN 4 TO 8 OUNCES OF LIQUID AND DRINK ONCE DAILY.  atorvastatin (LIPITOR) 80 mg tablet TAKE ONE TABLET BY MOUTH EVERY DAY      carvediloL (COREG) 3.125 mg tablet TAKE ONE TABLET BY MOUTH EVERY 12 HOURS      omeprazole (PRILOSEC) 40 mg capsule 1 cap(s)      glucose blood VI test strips (blood glucose test) strip Please measure blood glucose 2 times per day. DX code: E11.9 100 Strip 2    lancets misc Please measure blood glucose 2 times per day. DX Code E11.9 1 Each 2    apixaban (ELIQUIS) 2.5 mg tablet Take 1 Tablet by mouth two (2) times a day. 30 Tablet 0    multivit-min/FA/lycopen/lutein (CENTRUM SILVER MEN PO) Take  by mouth.  albuterol (PROVENTIL HFA, VENTOLIN HFA, PROAIR HFA) 90 mcg/actuation inhaler Take 2 Puffs by inhalation every four (4) hours as needed for Wheezing for up to 360 days. 1 Inhaler 2    carvediloL (COREG) 25 mg tablet Take 1 Tab by mouth two (2) times daily (with meals). 60 Tab 0    metFORMIN (GLUCOPHAGE) 1,000 mg tablet Take 1 Tab by mouth two (2) times daily (with meals). 60 Tab 0    omeprazole (PRILOSEC) 40 mg capsule Take 1 Cap by mouth daily. 30 Cap 0    furosemide (LASIX) 40 mg tablet Take 1 Tab by mouth daily. 30 Tab 0    cyclobenzaprine (FLEXERIL) 10 mg tablet TAKE 1 TABLET BY MOUTH THREE TIMES DAILY AS NEEDED FOR MUSCLE SPASMS (Patient not taking: Reported on 7/30/2021)      lidocaine (Lidoderm) 5 % Apply patch to the affected area for 12 hours a day and remove for 12 hours a day. (Patient not taking: Reported on 7/30/2021) 5 Each 0    tamsulosin (FLOMAX) 0.4 mg capsule 1 cap(s) (Patient not taking: Reported on 6/28/2021)      Blood-Glucose Meter monitoring kit Patient with Type II DM needs glucometer for blood glucose monitoring.  DX code E11.9 (Patient not taking: Reported on 7/30/2021) 1 Kit 0    Insulin Needles, Disposable, (Comfort EZ Pen Needles) 32 gauge x 5/32\" ndle E11.6 use for insulin SQ injection as directed (Patient not taking: Reported on 6/28/2021) 100 Pen Needle 2    Insulin Syringe-Needle U-100 0.5 mL 30 gauge x 5/16\" syrg Use as directed BID (Patient not taking: Reported on 6/28/2021) 200 Syringe 3    insulin regular (NOVOLIN R, HUMULIN R) 100 unit/mL injection 10 Units by SubCUTAneous route two (2) times a day. (Patient not taking: Reported on 6/28/2021) 3 Vial 2    insulin glargine (Lantus Solostar U-100 Insulin) 100 unit/mL (3 mL) inpn 30 Units by SubCUTAneous route two (2) times a day. (Patient not taking: Reported on 6/28/2021) 6 Pen 1    isosorbide-hydrALAZINE (BiDiL) 20-37.5 mg per tablet Take 1 Tab by mouth three (3) times daily. (Patient not taking: Reported on 6/28/2021) 90 Tab 0    atorvastatin (LIPITOR) 40 mg tablet Take 1 Tab by mouth daily. (Patient not taking: Reported on 6/28/2021) 30 Tab 0    hydroCHLOROthiazide (HYDRODIURIL) 25 mg tablet Take 1 Tab by mouth daily. (Patient not taking: Reported on 6/28/2021) 30 Tab 0    tamsulosin (FLOMAX) 0.4 mg capsule Take 1 Cap by mouth daily. (Patient not taking: Reported on 7/30/2021) 30 Cap 0    aspirin 81 mg chewable tablet Take 1 Tab by mouth daily.  (Patient not taking: Reported on 6/28/2021) 90 Tab 1    ergocalciferol (ERGOCALCIFEROL) 1,250 mcg (50,000 unit) capsule TK 1 C PO 1 TIME A WK (Patient not taking: Reported on 6/28/2021)       No Known Allergies    Objective:  Visit Vitals  /77   Pulse 76   Temp 98.2 °F (36.8 °C) (Oral)   Resp 16   Ht 6' (1.829 m)   Wt 221 lb (100.2 kg)   SpO2 99%   BMI 29.97 kg/m²     Physical Exam:   General appearance - alert, well appearing, and in no distress  Mental status - alert, oriented to person, place, and time  EYE-JUAN, EOMI, corneas normal, no foreign bodies  ENT-ENT exam normal, no neck nodes or sinus tenderness  Nose - normal and patent, no erythema, discharge or polyps  Mouth - mucous membranes moist, pharynx normal without lesions  Neck - supple, no significant adenopathy   Chest - clear to auscultation, no wheezes, rales or rhonchi, symmetric air entry   Heart - normal rate, regular rhythm, normal S1, S2, no murmurs, rubs, clicks or gallops   Abdomen - soft, nontender, nondistended, no masses or organomegaly  Lymph- no adenopathy palpable  Ext-peripheral pulses normal, no pedal edema, no clubbing or cyanosis  Skin-Warm and dry. no hyperpigmentation, vitiligo, or suspicious lesions  Neuro -alert, oriented, normal speech, no focal findings or movement disorder noted  Musculoskeletal-normal C-spine, no tenderness, full ROM without pain  Feet-no nail deformities or callus formation with good pulses noted      Results for orders placed or performed in visit on 07/30/21   AMB POC GLUCOSE BLOOD, BY GLUCOSE MONITORING DEVICE   Result Value Ref Range    Glucose  MG/DL       Assessment/Plan:    ICD-10-CM ICD-9-CM    1. Hospital discharge follow-up  Z09 V67.59 CBC WITH AUTOMATED DIFF      METABOLIC PANEL, COMPREHENSIVE      AMB POC URINALYSIS DIP STICK AUTO W/O MICRO      LIPID PANEL      HEMOGLOBIN A1C WITH EAG      CBC WITH AUTOMATED DIFF      METABOLIC PANEL, COMPREHENSIVE      LIPID PANEL      HEMOGLOBIN A1C WITH EAG   2. Type II diabetes mellitus with complication (HCC)  Z02.0 250.90 AMB POC GLUCOSE BLOOD, BY GLUCOSE MONITORING DEVICE      AMB POC URINALYSIS DIP STICK AUTO W/O MICRO      HEMOGLOBIN A1C WITH EAG      HEMOGLOBIN A1C WITH EAG   3. Essential hypertension  I10 401.9 CBC WITH AUTOMATED DIFF      METABOLIC PANEL, COMPREHENSIVE      CBC WITH AUTOMATED DIFF      METABOLIC PANEL, COMPREHENSIVE   4. Coronary artery disease with angina pectoris, unspecified vessel or lesion type, unspecified whether native or transplanted heart ([facility]  I25.119 414.00 LIPID PANEL     413.9 LIPID PANEL   5. Diabetic peripheral vascular disorder (HCC)  E11.51 250.70      443.81    6. Dizziness of unknown etiology  R42 780.4 REFERRAL TO NEUROLOGY   7. Fatigue, unspecified type  R53.83 780.79    8.  History of cocaine abuse ([facility]  F14.11 305.63 MONITOR SCREEN 10-DRUG CLASS PROFILE      MONITOR SCREEN 10-DRUG CLASS PROFILE     Orders Placed This Encounter    CBC WITH AUTOMATED DIFF     Standing Status:   Future     Number of Occurrences:   1     Standing Expiration Date:       METABOLIC PANEL, COMPREHENSIVE     Standing Status:   Future     Number of Occurrences:   1     Standing Expiration Date:   2022    LIPID PANEL     Standing Status:   Future     Number of Occurrences:   1     Standing Expiration Date:   2022    HEMOGLOBIN A1C WITH EAG     Standing Status:   Future     Number of Occurrences:   1     Standing Expiration Date:   2022    MONITOR SCREEN 10-DRUG CLASS PROFILE     Standing Status:   Future     Number of Occurrences:   1     Standing Expiration Date:   2022    [facility]     Referral Priority:   Routine     Referral Type:   Consultation     Referral Reason:   Specialty Services Required     Referred to Provider:   Trey Ingram MD     Number of Visits Requested:   1    AMB POC GLUCOSE BLOOD, BY GLUCOSE MONITORING DEVICE    AMB POC URINALYSIS DIP STICK AUTO W/O MICRO    Lantus U-100 Insulin 100 unit/mL injection     Sig: ADMINISTER 30 UNITS UNDER THE SKIN TWICE DAILY    amitriptyline (ELAVIL) 25 mg tablet     Sig: Take  by mouth nightly.  nicotine (NICODERM CQ) 21 mg/24 hr     Si Patch by TransDERmal route every twenty-four (24) hours.  cyclobenzaprine (FLEXERIL) 10 mg tablet     Sig: TAKE 1 TABLET BY MOUTH THREE TIMES DAILY AS NEEDED FOR MUSCLE SPASMS    spironolactone (ALDACTONE) 25 mg tablet     Sig: TAKE 1 TABLET BY MOUTH DAILY     Patient Instructions          Learning About Coronary Artery Disease (CAD)  What is coronary artery disease? Coronary artery disease is a condition that occurs when plaque builds up in the arteries that bring oxygen-rich blood to your heart.  Plaque is a fatty substance made of cholesterol, calcium, and other substances in the blood. This process is called hardening of the arteries, or atherosclerosis. What happens when you have coronary artery disease? · Plaque may narrow the coronary arteries. Narrowed arteries cause poor blood flow. This can lead to angina symptoms such as chest pain or discomfort. If blood flow is completely blocked, you could have a heart attack. · You can slow and reduce the risk of future problems by making changes in your lifestyle. These include quitting smoking and eating heart-healthy foods. · Treatment, along with changes in your lifestyle, can help you live a longer and healthier life. How can you prevent coronary artery disease? · Do not smoke. It may be the best thing you can do to prevent coronary artery disease. If you need help quitting, talk to your doctor about stop-smoking programs and medicines. These can increase your chances of quitting for good. · Be active. Try to do moderate activity at least 2½ hours a week. Or try to do vigorous activity at least 1¼ hours a week. You may want to walk or try other activities, such as running, swimming, cycling, or playing tennis or team sports. · Eat heart-healthy foods. Eat more fruits and vegetables and less food that contains saturated and trans fats. Limit alcohol, sodium, and sweets. · Stay at a healthy weight. Lose weight if you need to. · Manage other health problems such as diabetes, high blood pressure, and high cholesterol. How is coronary artery disease treated? · Your doctor will suggest that you make lifestyle changes. For example, your doctor may ask you to eat healthy foods, quit smoking, lose extra weight, and be more active. · You will take medicines that help prevent a heart attack. · Your doctor may suggest a procedure to open narrowed or blocked arteries. This is called angioplasty. Or your doctor may suggest using healthy blood vessels to create detours around narrowed or blocked arteries.  This is called bypass surgery. Follow-up care is a key part of your treatment and safety. Be sure to make and go to all appointments, and call your doctor if you are having problems. It's also a good idea to know your test results and keep a list of the medicines you take. Where can you learn more? Go to http://www.gray.com/  Enter C643 in the search box to learn more about \"Learning About Coronary Artery Disease (CAD). \"  Current as of: August 31, 2020               Content Version: 12.8  © 8494-6939 [employer]. Care instructions adapted under license by [employer] (which disclaims liability or warranty for this information). If you have questions about a medical condition or this instruction, always ask your healthcare professional. [address] any warranty or liability for your use of this information. Counting Carbohydrates: Care Instructions  Your Care Instructions     You don't have to eat special foods when you have diabetes. You just have to be careful to eat healthy foods. Carbohydrates (carbs) raise blood sugar higher and quicker than any other nutrient. Carbs are found in desserts, breads and cereals, and fruit. They're also in starchy vegetables. These include potatoes, corn, and grains such as rice and pasta. Carbs are also in milk and yogurt. The more carbs you eat at one time, the higher your blood sugar will rise. Spreading carbs all through the day helps keep your blood sugar levels within your target range. Counting carbs is one of the best ways to keep your blood sugar under control. If you use insulin, counting carbs helps you match the right amount of insulin to the number of grams of carbs in a meal. Then you can change your diet and insulin dose as needed. Testing your blood sugar several times a day can help you learn how carbs affect your blood sugar.   A registered dietitian or certified diabetes educator can help you plan meals and snacks. Follow-up care is a key part of your treatment and safety. Be sure to make and go to all appointments, and call your doctor if you are having problems. It's also a good idea to know your test results and keep a list of the medicines you take. How can you care for yourself at home? Know your daily amount of carbohydrates  Your daily amount depends on several things, such as your weight, how active you are, which diabetes medicines you take, and what your goals are for your blood sugar levels. A registered dietitian or certified diabetes educator can help you plan how many carbs to include in each meal and snack. For most adults, a guideline for the daily amount of carbs is:  · 45 to 60 grams at each meal. That's about the same as 3 to 4 carbohydrate servings. · 15 to 20 grams at each snack. That's about the same as 1 carbohydrate serving. Count carbs  Counting carbs lets you know how much rapid-acting insulin to take before you eat. If you use an insulin pump, you get a constant rate of insulin during the day. So the pump must be programmed at meals. This gives you extra insulin to cover the rise in blood sugar after meals. If you take insulin:  · Learn your own insulin-to-carb ratio. You and your diabetes health professional will figure out the ratio. You can do this by testing your blood sugar after meals. For example, you may need a certain amount of insulin for every 15 grams of carbs. · Add up the carb grams in a meal. Then you can figure out how many units of insulin to take based on your insulin-to-carb ratio. · Exercise lowers blood sugar. You can use less insulin than you would if you were not doing exercise. Keep in mind that timing matters. If you exercise within 1 hour after a meal, your body may need less insulin for that meal than it would if you exercised 3 hours after the meal. Test your blood sugar to find out how exercise affects your need for insulin.   If you do or don't take insulin:  · Look at labels on packaged foods. This can tell you how many carbs are in a serving. You can also use guides from the American Diabetes Association. · Be aware of portions, or serving sizes. If a package has two servings and you eat the whole package, you need to double the number of grams of carbohydrate listed for one serving. · Protein, fat, and fiber do not raise blood sugar as much as carbs do. If you eat a lot of these nutrients in a meal, your blood sugar will rise more slowly than it would otherwise. Eat from all food groups  · Eat at least three meals a day. · Plan meals to include food from all the food groups. The food groups include grains, fruits, dairy, proteins, and vegetables. · Talk to your dietitian or diabetes educator about ways to add limited amounts of sweets into your meal plan. · If you drink alcohol, talk to your doctor. It may not be recommended when you are taking certain diabetes medicines. Where can you learn more? Go to http://www.gray.com/  Enter G703 in the search box to learn more about \"Counting Carbohydrates: Care Instructions. \"  Current as of: August 31, 2020               Content Version: 12.8  © 0670-1590 [employer]. Care instructions adapted under license by [employer] (which disclaims liability or warranty for this information). If you have questions about a medical condition or this instruction, always ask your healthcare professional. [place] [address] any warranty or liability for your use of this information. High Blood Pressure: Care Instructions  Overview     It's normal for blood pressure to go up and down throughout the day. But if it stays up, you have high blood pressure. Another name for high blood pressure is hypertension. Despite what a lot of people think, high blood pressure usually doesn't cause headaches or make you feel dizzy or lightheaded.  It usually has no symptoms. But it does increase your risk of stroke, heart attack, and other problems. You and your doctor will talk about your risks of these problems based on your blood pressure. Your doctor will give you a goal for your blood pressure. Your goal will be based on your health and your age. Lifestyle changes, such as eating healthy and being active, are always important to help lower blood pressure. You might also take medicine to reach your blood pressure goal.  Follow-up care is a key part of your treatment and safety. Be sure to make and go to all appointments, and call your doctor if you are having problems. It's also a good idea to know your test results and keep a list of the medicines you take. How can you care for yourself at home? Medical treatment  · If you stop taking your medicine, your blood pressure will go back up. You may take one or more types of medicine to lower your blood pressure. Be safe with medicines. Take your medicine exactly as prescribed. Call your doctor if you think you are having a problem with your medicine. · Talk to your doctor before you start taking aspirin every day. Aspirin can help certain people lower their risk of a heart attack or stroke. But taking aspirin isn't right for everyone, because it can cause serious bleeding. · See your doctor regularly. You may need to see the doctor more often at first or until your blood pressure comes down. · If you are taking blood pressure medicine, talk to your doctor before you take decongestants or anti-inflammatory medicine, such as ibuprofen. Some of these medicines can raise blood pressure. · Learn how to check your blood pressure at home. Lifestyle changes  · Stay at a healthy weight. This is especially important if you put on weight around the waist. Losing even 10 pounds can help you lower your blood pressure. · If your doctor recommends it, get more exercise. Walking is a good choice.  Bit by bit, increase the amount you walk every day. Try for at least 30 minutes on most days of the week. You also may want to swim, bike, or do other activities. · Avoid or limit alcohol. Talk to your doctor about whether you can drink any alcohol. · Try to limit how much sodium you eat to less than 2,300 milligrams (mg) a day. Your doctor may ask you to try to eat less than 1,500 mg a day. · Eat plenty of fruits (such as bananas and oranges), vegetables, legumes, whole grains, and low-fat dairy products. · Lower the amount of saturated fat in your diet. Saturated fat is found in animal products such as milk, cheese, and meat. Limiting these foods may help you lose weight and also lower your risk for heart disease. · Do not smoke. Smoking increases your risk for heart attack and stroke. If you need help quitting, talk to your doctor about stop-smoking programs and medicines. These can increase your chances of quitting for good. When should you call for help? Call  911 anytime you think you may need emergency care. This may mean having symptoms that suggest that your blood pressure is causing a serious heart or blood vessel problem. Your blood pressure may be over 180/120. For example, call 911 if:    · You have symptoms of a heart attack. These may include:  ? Chest pain or pressure, or a strange feeling in the chest.  ? Sweating. ? Shortness of breath. ? Nausea or vomiting. ? Pain, pressure, or a strange feeling in the back, neck, jaw, or upper belly or in one or both shoulders or arms. ? Lightheadedness or sudden weakness. ? A fast or irregular heartbeat.     · You have symptoms of a stroke. These may include:  ? Sudden numbness, tingling, weakness, or loss of movement in your face, arm, or leg, especially on only one side of your body. ? Sudden vision changes. ? Sudden trouble speaking. ? Sudden confusion or trouble understanding simple statements. ? Sudden problems with walking or balance.   ? A sudden, severe headache that is different from past headaches.     · You have severe back or belly pain. Do not wait until your blood pressure comes down on its own. Get help right away. Call your doctor now or seek immediate care if:    · Your blood pressure is much higher than normal (such as 180/120 or higher), but you don't have symptoms.     · You think high blood pressure is causing symptoms, such as:  ? Severe headache.  ? Blurry vision. Watch closely for changes in your health, and be sure to contact your doctor if:    · Your blood pressure measures higher than your doctor recommends at least 2 times. That means the top number is higher or the bottom number is higher, or both.     · You think you may be having side effects from your blood pressure medicine. Where can you learn more? Go to http://www.gray.com/  Enter [id number] in the search box to learn more about \"High Blood Pressure: Care Instructions. \"  Current as of: August 31, 2020               Content Version: 12.8  © 2006-2021 [employer]. Care instructions adapted under license by [employer] (which disclaims liability or warranty for this information). If you have questions about a medical condition or this instruction, always ask your healthcare professional. [place] [address] any warranty or liability for your use of this information. Codependency: Care Instructions  Your Care Instructions  Codependency happens when you are greatly affected by a loved one's or another family member's substance use disorder. You may feel that you can control the actions of another person through your will power. Since this cannot be done, you feel hopeless, betrayed, or angry. Substance use disorder is a disease. It can range from mild to severe. Moderate to severe substance use disorder is sometimes called addiction.  A person with an substance use disorder is not choosing a drug or alcohol instead of friends or family. The person needs professional help to deal with the substance use disorder. The best thing you can do is to help the person get the help he or she needs. Also, it is very important for you to take care of yourself. Making sure you get the understanding and respect you need will help you stay healthy in both physical and emotional ways. Follow-up care is a key part of your treatment and safety. Be sure to make and go to all appointments, and call your doctor if you are having problems. It's also a good idea to know your test results and keep a list of the medicines you take. How can you care for yourself at home? · Learn all you can about codependency. Being informed can help you deal with the problem. · Do not blame yourself for your family member's condition. · Find a counselor you like and trust. Talk openly and honestly about your problems. Be willing to make some changes. · Go to all counseling sessions. Do not skip any just because you are feeling better. · Talk to friends and family for emotional support. · Make a plan with all family members about how to take care of your loved one when symptoms of substance use disorder are bad. · Do not focus attention only on the family member who has the problem. · Get enough rest. When you are too tired, it can be hard to cope with even small problems. · Eat a healthy, balanced diet to help prevent illness. · Get at least 30 minutes of exercise on most days of the week. Walking is a good choice. You also may want to do other activities, such as running, swimming, cycling, or playing tennis or team sports. Exercise can help you relieve stress and feel better. · Stay active. Try to do the things you usually enjoy, even if you do not feel like doing them. · Join a support group for family members, such as [employer]. Talking with other people who are going through the same things can help. When should you call for help?   Watch closely for changes in your health, and be sure to contact your doctor if:    · You cannot concentrate or are easily confused.     · You have trouble taking care of yourself.     · You cannot go to your counseling sessions.     · You feel sad or depressed. Where can you learn more? Go to http://www.gray.com/  Enter [id number] in the search box to learn more about \"Codependency: Care Instructions. \"  Current as of: September 23, 2020               Content Version: 12.8  © 2021-6863 [employer]. Care instructions adapted under license by [employer] (which disclaims liability or warranty for this information). If you have questions about a medical condition or this instruction, always ask your healthcare professional. [address] any warranty or liability for your use of this information. Follow-up and Dispositions    · Return in about 2 weeks (around 8/13/2021), or if symptoms worsen or fail to improve, for Discuss Test results. I have reviewed with the patient details of the assessment and plan and all questions were answered. Relevent patient education was performed. The most recent lab findings were reviewed with the patient. PCP called and spoke with patients caregiver Hao Turk) about patient concerns and instructions. Cara Garcia is to follow through with setting up medication box for patient to facilitate medication administration. He is also agreeable to contact  Oued Zbib) about concerns of needing home assistance. An After Visit Summary was printed and given to the patient.

## 2021-07-31 LAB
AMPHETAMINES UR QL SCN: NEGATIVE NG/ML
BARBITURATES UR QL SCN: NEGATIVE NG/ML
BENZODIAZ UR QL SCN: NEGATIVE NG/ML
BZE UR QL SCN: NEGATIVE NG/ML
CANNABINOIDS UR QL SCN: NEGATIVE NG/ML
CREAT UR-MCNC: 162.6 MG/DL (ref 20–300)
METHADONE UR QL SCN: NEGATIVE NG/ML
OPIATES UR QL SCN: NEGATIVE NG/ML
OXYCODONE+OXYMORPHONE UR QL SCN: NEGATIVE NG/ML
PCP UR QL: NEGATIVE NG/ML
PH UR: 5.9 [PH] (ref 4.5–8.9)
PLEASE NOTE:, 733163: NORMAL
PROPOXYPH UR QL SCN: NEGATIVE NG/ML

## 2021-08-26 DIAGNOSIS — K21.9 GASTROESOPHAGEAL REFLUX DISEASE WITHOUT ESOPHAGITIS: ICD-10-CM

## 2021-08-26 NOTE — TELEPHONE ENCOUNTER
Historical medications:  - Prinivil 10 mg,   - Aldactone 25 mg,     [facility] is also requesting a refill for Coreg 6.25 mg on behalf of the pt. Pt shows strengths of 25 mg and 3.125 mg. No 6.25 mg strength on pt's medication history. Please review. Last visit 07/30/2021 FAUSTO Calhoun   Next appointment Nothing scheduled       Requested Prescriptions     Pending Prescriptions Disp Refills    lisinopriL (PRINIVIL, ZESTRIL) 10 mg tablet 30 Tablet 0     Sig: Take 1 Tablet by mouth daily.  spironolactone (ALDACTONE) 25 mg tablet 30 Tablet 0     Sig: Take 1 Tablet by mouth daily.

## 2021-08-26 NOTE — TELEPHONE ENCOUNTER
Last visit 07/30/2021 NP Kaykay Madison   Next appointment Nothing scheduled   Previous refill encounter(s)   11/13/2020 Prilosec #30 - written by Dr. Vi Shelton     Requested Prescriptions     Pending Prescriptions Disp Refills    omeprazole (PRILOSEC) 40 mg capsule 30 Capsule 0     Sig: Take 1 Capsule by mouth daily.

## 2021-08-27 RX ORDER — LISINOPRIL 10 MG/1
10 TABLET ORAL DAILY
Qty: 30 TABLET | Refills: 0 | Status: SHIPPED | OUTPATIENT
Start: 2021-08-27

## 2021-08-27 RX ORDER — SPIRONOLACTONE 25 MG/1
25 TABLET ORAL DAILY
Qty: 30 TABLET | Refills: 0 | Status: SHIPPED | OUTPATIENT
Start: 2021-08-27

## 2021-08-27 RX ORDER — OMEPRAZOLE 40 MG/1
40 CAPSULE, DELAYED RELEASE ORAL DAILY
Qty: 30 CAPSULE | Refills: 0 | Status: SHIPPED | OUTPATIENT
Start: 2021-08-27

## 2021-09-10 ENCOUNTER — TELEPHONE (OUTPATIENT)
Dept: INTERNAL MEDICINE CLINIC | Age: 71
End: 2021-09-10

## 2021-09-10 RX ORDER — INSULIN GLARGINE 100 [IU]/ML
30 INJECTION, SOLUTION SUBCUTANEOUS DAILY
Qty: 9 ML | Refills: 2 | OUTPATIENT
Start: 2021-09-10 | End: 2021-12-09

## 2021-09-10 NOTE — TELEPHONE ENCOUNTER
PCP called pharmacy to clarify details of his prescription orders (Lantus 30 units subcu twice a day). Prescription was written by a historical provider, last PCP Darryl Huang And reordered by another provider Dr. Tim Valencia in March 2021. PCP is not the prescriber of this medication, had concerns about twice daily dosing versus daily. Pharmacist with same concerns, stated that medication is not typically prescribed twice daily. Patient has been to the pharmacy several times to attempt to get his Lantus, pharmacy wanting to know frequency of medication dosing. PCP gave verbal orders for a refill of Lantus 30 units subcu daily to facilitate patient with managing his blood sugar. PCP attempted to reach the patient at home to discuss medication orders, no answer was received. Patient needs to come into the clinic as soon as possible for further evaluation and medication reconciliation to determine frequency of dosing. Will attempt to reach the patient again at a later time to discuss medication concerns with him, and conduct medication reconciliation.

## 2021-10-29 ENCOUNTER — APPOINTMENT (OUTPATIENT)
Dept: CT IMAGING | Age: 71
DRG: 045 | End: 2021-10-29
Attending: EMERGENCY MEDICINE
Payer: MEDICAID

## 2021-10-29 ENCOUNTER — HOSPITAL ENCOUNTER (INPATIENT)
Age: 71
LOS: 11 days | Discharge: NURSING, REHAB OR LONG-TERM CARE | DRG: 045 | End: 2021-11-09
Attending: EMERGENCY MEDICINE | Admitting: INTERNAL MEDICINE
Payer: MEDICAID

## 2021-10-29 ENCOUNTER — APPOINTMENT (OUTPATIENT)
Dept: GENERAL RADIOLOGY | Age: 71
DRG: 045 | End: 2021-10-29
Attending: EMERGENCY MEDICINE
Payer: MEDICAID

## 2021-10-29 DIAGNOSIS — I26.99 ACUTE PULMONARY EMBOLISM, UNSPECIFIED PULMONARY EMBOLISM TYPE, UNSPECIFIED WHETHER ACUTE COR PULMONALE PRESENT (HCC): ICD-10-CM

## 2021-10-29 DIAGNOSIS — Z79.4 CONTROLLED TYPE 2 DIABETES MELLITUS WITH OTHER SPECIFIED COMPLICATION, WITH LONG-TERM CURRENT USE OF INSULIN (HCC): ICD-10-CM

## 2021-10-29 DIAGNOSIS — I63.9 OCCIPITAL STROKE (HCC): Primary | ICD-10-CM

## 2021-10-29 DIAGNOSIS — I25.10 CORONARY ARTERY DISEASE WITHOUT ANGINA PECTORIS, UNSPECIFIED VESSEL OR LESION TYPE, UNSPECIFIED WHETHER NATIVE OR TRANSPLANTED HEART: ICD-10-CM

## 2021-10-29 DIAGNOSIS — R56.9 SEIZURE-LIKE ACTIVITY (HCC): ICD-10-CM

## 2021-10-29 DIAGNOSIS — E11.69 CONTROLLED TYPE 2 DIABETES MELLITUS WITH OTHER SPECIFIED COMPLICATION, WITH LONG-TERM CURRENT USE OF INSULIN (HCC): ICD-10-CM

## 2021-10-29 LAB
ALBUMIN SERPL-MCNC: 3.3 G/DL (ref 3.5–5)
ALBUMIN/GLOB SERPL: 0.8 {RATIO} (ref 1.1–2.2)
ALP SERPL-CCNC: 89 U/L (ref 45–117)
ALT SERPL-CCNC: 29 U/L (ref 12–78)
AMPHET UR QL SCN: NEGATIVE
ANION GAP SERPL CALC-SCNC: 4 MMOL/L (ref 5–15)
APPEARANCE UR: CLEAR
AST SERPL-CCNC: 23 U/L (ref 15–37)
BACTERIA URNS QL MICRO: NEGATIVE /HPF
BARBITURATES UR QL SCN: NEGATIVE
BASOPHILS # BLD: 0.1 K/UL (ref 0–0.1)
BASOPHILS NFR BLD: 1 % (ref 0–1)
BENZODIAZ UR QL: NEGATIVE
BILIRUB SERPL-MCNC: 0.7 MG/DL (ref 0.2–1)
BILIRUB UR QL: NEGATIVE
BNP SERPL-MCNC: 285 PG/ML
BUN SERPL-MCNC: 10 MG/DL (ref 6–20)
BUN/CREAT SERPL: 13 (ref 12–20)
CALCIUM SERPL-MCNC: 9.4 MG/DL (ref 8.5–10.1)
CANNABINOIDS UR QL SCN: POSITIVE
CHLORIDE SERPL-SCNC: 109 MMOL/L (ref 97–108)
CO2 SERPL-SCNC: 26 MMOL/L (ref 21–32)
COCAINE UR QL SCN: NEGATIVE
COLOR UR: ABNORMAL
COMMENT, HOLDF: NORMAL
COMMENT, HOLDF: NORMAL
CREAT SERPL-MCNC: 0.78 MG/DL (ref 0.7–1.3)
DIFFERENTIAL METHOD BLD: ABNORMAL
DRUG SCRN COMMENT,DRGCM: ABNORMAL
EOSINOPHIL # BLD: 0.2 K/UL (ref 0–0.4)
EOSINOPHIL NFR BLD: 2 % (ref 0–7)
EPITH CASTS URNS QL MICRO: ABNORMAL /LPF
ERYTHROCYTE [DISTWIDTH] IN BLOOD BY AUTOMATED COUNT: 14 % (ref 11.5–14.5)
GLOBULIN SER CALC-MCNC: 4.2 G/DL (ref 2–4)
GLUCOSE BLD STRIP.AUTO-MCNC: 170 MG/DL (ref 65–117)
GLUCOSE BLD STRIP.AUTO-MCNC: 54 MG/DL (ref 65–117)
GLUCOSE BLD STRIP.AUTO-MCNC: 54 MG/DL (ref 65–117)
GLUCOSE BLD STRIP.AUTO-MCNC: 66 MG/DL (ref 65–117)
GLUCOSE BLD STRIP.AUTO-MCNC: 74 MG/DL (ref 65–117)
GLUCOSE BLD STRIP.AUTO-MCNC: 89 MG/DL (ref 65–117)
GLUCOSE SERPL-MCNC: 87 MG/DL (ref 65–100)
GLUCOSE UR STRIP.AUTO-MCNC: NEGATIVE MG/DL
HCT VFR BLD AUTO: 49.8 % (ref 36.6–50.3)
HGB BLD-MCNC: 16.5 G/DL (ref 12.1–17)
HGB UR QL STRIP: NEGATIVE
IMM GRANULOCYTES # BLD AUTO: 0 K/UL (ref 0–0.04)
IMM GRANULOCYTES NFR BLD AUTO: 0 % (ref 0–0.5)
KETONES UR QL STRIP.AUTO: ABNORMAL MG/DL
LEUKOCYTE ESTERASE UR QL STRIP.AUTO: ABNORMAL
LYMPHOCYTES # BLD: 3.6 K/UL (ref 0.8–3.5)
LYMPHOCYTES NFR BLD: 50 % (ref 12–49)
MAGNESIUM SERPL-MCNC: 2 MG/DL (ref 1.6–2.4)
MCH RBC QN AUTO: 31.1 PG (ref 26–34)
MCHC RBC AUTO-ENTMCNC: 33.1 G/DL (ref 30–36.5)
MCV RBC AUTO: 93.8 FL (ref 80–99)
METHADONE UR QL: NEGATIVE
MONOCYTES # BLD: 0.6 K/UL (ref 0–1)
MONOCYTES NFR BLD: 9 % (ref 5–13)
MUCOUS THREADS URNS QL MICRO: ABNORMAL /LPF
NEUTS SEG # BLD: 2.7 K/UL (ref 1.8–8)
NEUTS SEG NFR BLD: 38 % (ref 32–75)
NITRITE UR QL STRIP.AUTO: NEGATIVE
NRBC # BLD: 0 K/UL (ref 0–0.01)
NRBC BLD-RTO: 0 PER 100 WBC
OPIATES UR QL: NEGATIVE
PCP UR QL: NEGATIVE
PH UR STRIP: 5.5 [PH] (ref 5–8)
PLATELET # BLD AUTO: 180 K/UL (ref 150–400)
PMV BLD AUTO: 11.5 FL (ref 8.9–12.9)
POTASSIUM SERPL-SCNC: 4.2 MMOL/L (ref 3.5–5.1)
PROT SERPL-MCNC: 7.5 G/DL (ref 6.4–8.2)
PROT UR STRIP-MCNC: NEGATIVE MG/DL
RBC # BLD AUTO: 5.31 M/UL (ref 4.1–5.7)
RBC #/AREA URNS HPF: ABNORMAL /HPF (ref 0–5)
SAMPLES BEING HELD,HOLD: NORMAL
SAMPLES BEING HELD,HOLD: NORMAL
SERVICE CMNT-IMP: ABNORMAL
SERVICE CMNT-IMP: NORMAL
SODIUM SERPL-SCNC: 139 MMOL/L (ref 136–145)
SP GR UR REFRACTOMETRY: 1.03 (ref 1–1.03)
TROPONIN-HIGH SENSITIVITY: 22 NG/L (ref 0–76)
UR CULT HOLD, URHOLD: NORMAL
UROBILINOGEN UR QL STRIP.AUTO: 1 EU/DL (ref 0.2–1)
WBC # BLD AUTO: 7.2 K/UL (ref 4.1–11.1)
WBC URNS QL MICRO: ABNORMAL /HPF (ref 0–4)

## 2021-10-29 PROCEDURE — 93005 ELECTROCARDIOGRAM TRACING: CPT

## 2021-10-29 PROCEDURE — 36600 WITHDRAWAL OF ARTERIAL BLOOD: CPT

## 2021-10-29 PROCEDURE — 96374 THER/PROPH/DIAG INJ IV PUSH: CPT

## 2021-10-29 PROCEDURE — 83735 ASSAY OF MAGNESIUM: CPT

## 2021-10-29 PROCEDURE — 80053 COMPREHEN METABOLIC PANEL: CPT

## 2021-10-29 PROCEDURE — 74011000250 HC RX REV CODE- 250: Performed by: EMERGENCY MEDICINE

## 2021-10-29 PROCEDURE — 71046 X-RAY EXAM CHEST 2 VIEWS: CPT

## 2021-10-29 PROCEDURE — 70450 CT HEAD/BRAIN W/O DYE: CPT

## 2021-10-29 PROCEDURE — 81001 URINALYSIS AUTO W/SCOPE: CPT

## 2021-10-29 PROCEDURE — 80307 DRUG TEST PRSMV CHEM ANLYZR: CPT

## 2021-10-29 PROCEDURE — 74011000636 HC RX REV CODE- 636: Performed by: RADIOLOGY

## 2021-10-29 PROCEDURE — 82962 GLUCOSE BLOOD TEST: CPT

## 2021-10-29 PROCEDURE — 99285 EMERGENCY DEPT VISIT HI MDM: CPT

## 2021-10-29 PROCEDURE — 70498 CT ANGIOGRAPHY NECK: CPT

## 2021-10-29 PROCEDURE — 65660000000 HC RM CCU STEPDOWN

## 2021-10-29 PROCEDURE — 36415 COLL VENOUS BLD VENIPUNCTURE: CPT

## 2021-10-29 PROCEDURE — 85025 COMPLETE CBC W/AUTO DIFF WBC: CPT

## 2021-10-29 PROCEDURE — 84484 ASSAY OF TROPONIN QUANT: CPT

## 2021-10-29 PROCEDURE — 83880 ASSAY OF NATRIURETIC PEPTIDE: CPT

## 2021-10-29 PROCEDURE — 74011250637 HC RX REV CODE- 250/637: Performed by: EMERGENCY MEDICINE

## 2021-10-29 RX ORDER — MAGNESIUM SULFATE 100 %
4 CRYSTALS MISCELLANEOUS AS NEEDED
Status: DISCONTINUED | OUTPATIENT
Start: 2021-10-29 | End: 2021-11-09 | Stop reason: HOSPADM

## 2021-10-29 RX ORDER — INSULIN LISPRO 100 [IU]/ML
INJECTION, SOLUTION INTRAVENOUS; SUBCUTANEOUS
Status: DISCONTINUED | OUTPATIENT
Start: 2021-10-30 | End: 2021-11-09 | Stop reason: HOSPADM

## 2021-10-29 RX ORDER — SPIRONOLACTONE 25 MG/1
25 TABLET ORAL DAILY
Status: DISCONTINUED | OUTPATIENT
Start: 2021-10-30 | End: 2021-11-09 | Stop reason: HOSPADM

## 2021-10-29 RX ORDER — BISACODYL 5 MG
5 TABLET, DELAYED RELEASE (ENTERIC COATED) ORAL DAILY PRN
Status: DISCONTINUED | OUTPATIENT
Start: 2021-10-29 | End: 2021-11-09 | Stop reason: HOSPADM

## 2021-10-29 RX ORDER — ALBUTEROL SULFATE 90 UG/1
6 AEROSOL, METERED RESPIRATORY (INHALATION)
Status: COMPLETED | OUTPATIENT
Start: 2021-10-29 | End: 2021-10-29

## 2021-10-29 RX ORDER — ONDANSETRON 2 MG/ML
4 INJECTION INTRAMUSCULAR; INTRAVENOUS
Status: DISCONTINUED | OUTPATIENT
Start: 2021-10-29 | End: 2021-11-09 | Stop reason: HOSPADM

## 2021-10-29 RX ORDER — METHOCARBAMOL 750 MG/1
750 TABLET, FILM COATED ORAL 3 TIMES DAILY
Status: DISCONTINUED | OUTPATIENT
Start: 2021-10-30 | End: 2021-11-09 | Stop reason: HOSPADM

## 2021-10-29 RX ORDER — FUROSEMIDE 40 MG/1
40 TABLET ORAL DAILY
Status: DISCONTINUED | OUTPATIENT
Start: 2021-10-30 | End: 2021-11-09 | Stop reason: HOSPADM

## 2021-10-29 RX ORDER — PANTOPRAZOLE SODIUM 40 MG/1
40 TABLET, DELAYED RELEASE ORAL
Status: DISCONTINUED | OUTPATIENT
Start: 2021-10-30 | End: 2021-11-09 | Stop reason: HOSPADM

## 2021-10-29 RX ORDER — HEPARIN SODIUM 5000 [USP'U]/ML
5000 INJECTION, SOLUTION INTRAVENOUS; SUBCUTANEOUS EVERY 8 HOURS
Status: DISCONTINUED | OUTPATIENT
Start: 2021-10-29 | End: 2021-10-29

## 2021-10-29 RX ORDER — CARVEDILOL 12.5 MG/1
25 TABLET ORAL 2 TIMES DAILY WITH MEALS
Status: DISCONTINUED | OUTPATIENT
Start: 2021-10-30 | End: 2021-11-02

## 2021-10-29 RX ORDER — TAMSULOSIN HYDROCHLORIDE 0.4 MG/1
0.4 CAPSULE ORAL DAILY
Status: DISCONTINUED | OUTPATIENT
Start: 2021-10-30 | End: 2021-11-09 | Stop reason: HOSPADM

## 2021-10-29 RX ORDER — ACETAMINOPHEN 650 MG/1
650 SUPPOSITORY RECTAL
Status: DISCONTINUED | OUTPATIENT
Start: 2021-10-29 | End: 2021-11-09 | Stop reason: HOSPADM

## 2021-10-29 RX ORDER — GUAIFENESIN 100 MG/5ML
81 LIQUID (ML) ORAL DAILY
Status: DISCONTINUED | OUTPATIENT
Start: 2021-10-30 | End: 2021-11-09 | Stop reason: HOSPADM

## 2021-10-29 RX ORDER — DEXTROSE MONOHYDRATE 100 MG/ML
500 INJECTION, SOLUTION INTRAVENOUS ONCE
Status: COMPLETED | OUTPATIENT
Start: 2021-10-29 | End: 2021-10-29

## 2021-10-29 RX ORDER — IBUPROFEN 200 MG
1 TABLET ORAL EVERY 24 HOURS
Status: DISCONTINUED | OUTPATIENT
Start: 2021-10-30 | End: 2021-11-09 | Stop reason: HOSPADM

## 2021-10-29 RX ORDER — IPRATROPIUM BROMIDE AND ALBUTEROL SULFATE 2.5; .5 MG/3ML; MG/3ML
3 SOLUTION RESPIRATORY (INHALATION)
Status: DISCONTINUED | OUTPATIENT
Start: 2021-10-29 | End: 2021-11-09 | Stop reason: HOSPADM

## 2021-10-29 RX ORDER — ACETAMINOPHEN 325 MG/1
650 TABLET ORAL
Status: DISCONTINUED | OUTPATIENT
Start: 2021-10-29 | End: 2021-11-09 | Stop reason: HOSPADM

## 2021-10-29 RX ORDER — AMITRIPTYLINE HYDROCHLORIDE 25 MG/1
25 TABLET, FILM COATED ORAL
Status: DISCONTINUED | OUTPATIENT
Start: 2021-10-29 | End: 2021-11-09 | Stop reason: HOSPADM

## 2021-10-29 RX ORDER — ACETAMINOPHEN 500 MG
1000 TABLET ORAL
Status: COMPLETED | OUTPATIENT
Start: 2021-10-29 | End: 2021-10-29

## 2021-10-29 RX ORDER — DEXTROSE 50 % IN WATER (D50W) INTRAVENOUS SYRINGE
12.5-25 AS NEEDED
Status: DISCONTINUED | OUTPATIENT
Start: 2021-10-29 | End: 2021-11-09 | Stop reason: HOSPADM

## 2021-10-29 RX ORDER — LISINOPRIL 10 MG/1
10 TABLET ORAL DAILY
Status: DISCONTINUED | OUTPATIENT
Start: 2021-10-30 | End: 2021-11-09 | Stop reason: HOSPADM

## 2021-10-29 RX ORDER — ATORVASTATIN CALCIUM 40 MG/1
80 TABLET, FILM COATED ORAL DAILY
Status: DISCONTINUED | OUTPATIENT
Start: 2021-10-30 | End: 2021-11-09 | Stop reason: HOSPADM

## 2021-10-29 RX ADMIN — DEXTROSE MONOHYDRATE 500 ML: 100 INJECTION, SOLUTION INTRAVENOUS at 21:49

## 2021-10-29 RX ADMIN — ALBUTEROL SULFATE 6 PUFF: 90 AEROSOL, METERED RESPIRATORY (INHALATION) at 17:24

## 2021-10-29 RX ADMIN — IOPAMIDOL 100 ML: 755 INJECTION, SOLUTION INTRAVENOUS at 21:05

## 2021-10-29 RX ADMIN — ACETAMINOPHEN 1000 MG: 500 TABLET ORAL at 16:29

## 2021-10-29 NOTE — ED PROVIDER NOTES
80 yr old smoker w hx of CAD, DM, HTN, & CHF presents w fatigue, dyspnea on exertion, darkened vision, lightheadedness, & dizziness. Symptoms started after chopping wood last week and have been worsening. He has been non-compliant with his medications, including his insulin. His son reports that he seems increasingly more disoriented. He denies drug use. He did have an episode of chest pain after working on the lumber and has had back soreness. Past Medical History:   Diagnosis Date    CAD (coronary artery disease)     Diabetes ([address]     GERD (gastroesophageal reflux disease)     Heart failure (HCC)     Hypertension        Past Surgical History:   Procedure Laterality Date    HX CORONARY ARTERY BYPASS GRAFT      4 way         History reviewed. No pertinent family history. Social History     Socioeconomic History    Marital status:      Spouse name: Not on file    Number of children: Not on file    Years of education: Not on file    Highest education level: Not on file   Occupational History    Not on file   Tobacco Use    Smoking status: Current Every Day Smoker     Packs/day: 0.50     Types: Cigarettes    Smokeless tobacco: Never Used   Substance and Sexual Activity    Alcohol use: No    Drug use: Not Currently    Sexual activity: Not Currently   Other Topics Concern    Not on file   Social History Narrative    Not on file     Social Determinants of Health     Financial Resource Strain:     Difficulty of Paying Living Expenses:    Food Insecurity:     Worried About Running Out of Food in the Last Year:     [address] in the Last Year:    Transportation Needs:     Lack of Transportation (Medical):      Lack of Transportation (Non-Medical):    Physical Activity:     Days of Exercise per Week:     Minutes of Exercise per Session:    Stress:     Feeling of Stress :    Social Connections:     Frequency of Communication with Friends and Family:     Frequency of Social Gatherings with Friends and Family:     Attends [employer] Services:     Active Member of Clubs or Organizations:     Attends Club or Organization Meetings:     Marital Status:    Intimate Partner Violence:     Fear of Current or Ex-Partner:     Emotionally Abused:     Physically Abused:     Sexually Abused: ALLERGIES: Patient has no known allergies. Review of Systems   Constitutional: Positive for chills and fatigue. Negative for fever. Eyes: Positive for visual disturbance. Negative for photophobia. Respiratory: Positive for cough, shortness of breath and wheezing. Cardiovascular: Positive for chest pain. Negative for leg swelling. Gastrointestinal: Negative for abdominal pain, constipation, diarrhea and vomiting. Neurological: Positive for dizziness, weakness and light-headedness. All other systems reviewed and are negative. Vitals:    10/29/21 1446   BP: (!) 144/90   Pulse: (!) 55   Temp: 97.7 °F (36.5 °C)   SpO2: 99%   Weight: 102.3 kg (225 lb 8.5 oz)   Height: 6' (1.829 m)            Physical Exam  Vitals and nursing note reviewed. Constitutional:       General: He is not in acute distress. Appearance: He is well-developed. He is not ill-appearing. HENT:      Head: Normocephalic and atraumatic. Eyes:      Extraocular Movements: Extraocular movements intact. Conjunctiva/sclera: Conjunctivae normal.      Comments: Pupils small   Cardiovascular:      Rate and Rhythm: Normal rate and regular rhythm. Pulmonary:      Effort: Pulmonary effort is normal.      Breath sounds: Wheezing present. Abdominal:      General: There is no distension. Palpations: Abdomen is soft. Tenderness: There is no abdominal tenderness. Musculoskeletal:         General: Normal range of motion. Cervical back: Normal range of motion. Right lower le+ Edema present. Left lower le+ Edema present. Skin:     General: Skin is warm and dry. Capillary Refill: Capillary refill takes less than 2 seconds. Neurological:      General: No focal deficit present. Mental Status: He is alert and oriented to person, place, and time. Psychiatric:         Attention and Perception: He is inattentive. Mood and Affect: Mood normal.         Speech: Speech is delayed. Behavior: Behavior is slowed. Cognition and Memory: Cognition normal.          [facility]  ED Course as of Oct 29 2008   Fri Oct 29, 2021   1840 Troponin-High Sensitivity: 25 [IO]      ED Course User Index  [IO] Juanita Soto MD       Procedures        MEDICAL DECISION MAKIN y.o. male presents with Fatigue and Blood sugar problem    Differential diagnosis includes but not limited to:  Electrolyte disturbance, arrhythmia, STEMI, seizure, meningitis, stroke, sepsis, subarachnoid hemorrhage, intracranial bleeding, encephalitis, DKA, HHS. LABORATORY TESTS:  Labs Reviewed   URINALYSIS W/MICROSCOPIC - Abnormal; Notable for the following components:       Result Value    Ketone TRACE (*)     Leukocyte Esterase SMALL (*)     Mucus 1+ (*)     All other components within normal limits   CBC WITH AUTOMATED DIFF - Abnormal; Notable for the following components:    LYMPHOCYTES 50 (*)     ABS.  LYMPHOCYTES 3.6 (*)     All other components within normal limits   METABOLIC PANEL, COMPREHENSIVE - Abnormal; Notable for the following components:    Chloride 109 (*)     Anion gap 4 (*)     Albumin 3.3 (*)     Globulin 4.2 (*)     A-G Ratio 0.8 (*)     All other components within normal limits   NT-PRO BNP - Abnormal; Notable for the following components:    NT pro- (*)     All other components within normal limits   DRUG SCREEN, URINE - Abnormal; Notable for the following components:    THC (TH-CANNABINOL) Positive (*)     All other components within normal limits   URINE CULTURE HOLD SAMPLE   SAMPLES BEING HELD   SAMPLES BEING HELD   TROPONIN-HIGH SENSITIVITY   MAGNESIUM TROPONIN-HIGH SENSITIVITY   GLUCOSE, POC       IMAGING RESULTS:  CT HEAD WO CONT   Final Result   Foci of acute/subacute ischemia are noted in the right occipital lobe. Small   vessel ischemic changes. No hemorrhage is identified. XR CHEST PA LAT   Final Result   No acute cardiopulmonary process. CTA HEAD NECK W CONT    (Results Pending)       MEDICATIONS GIVEN:  Medications   albuterol (PROVENTIL HFA, VENTOLIN HFA, PROAIR HFA) inhaler 6 Puff (6 Puffs Inhalation Given 10/29/21 1724)   acetaminophen (TYLENOL) tablet 1,000 mg (1,000 mg Oral Given 10/29/21 1629)       PROGRESS NOTE:   The patient's ED course has been uncomplicated      CONSULTS:  Hospitalist Consult: [facility] for Admission  8:09 PM    ED Room Number: ER22/22  Patient Name and age:  Horacio Santos 80 y.o.  male  Working Diagnosis:   1. Occipital stroke ([facility] [address]        COVID-19 Suspicion:  no  Sepsis present:  no  Reassessment needed: no  Code Status:  Full Code  Readmission: no  Isolation Requirements:  no  Recommended Level of Care:  telemetry  Department:[facility] Adult ED - 21   Other:  CTA pending    IMPRESSION:  1. Occipital stroke [facility])        PLAN:  - Admit to hospitalist    Total critical care time spent exclusive of procedures:  35 minutes    Burke Gayle MD          Please note that this dictation was completed with [employer], the computer voice recognition software. Quite often unanticipated grammatical, syntax, homophones, and other interpretive errors are inadvertently transcribed by the computer software. Please disregard these errors. Please excuse any errors that have escaped final proofreading.

## 2021-10-29 NOTE — ED NOTES
Pt ambulatory to ED accompanied by nephew with c/o fatigue, general weakness and SOB last week after \"cutting wood for a friend\".

## 2021-10-30 ENCOUNTER — APPOINTMENT (OUTPATIENT)
Dept: MRI IMAGING | Age: 71
DRG: 045 | End: 2021-10-30
Attending: INTERNAL MEDICINE
Payer: MEDICAID

## 2021-10-30 LAB
ALBUMIN SERPL-MCNC: 2.8 G/DL (ref 3.5–5)
ALBUMIN/GLOB SERPL: 0.7 {RATIO} (ref 1.1–2.2)
ALP SERPL-CCNC: 80 U/L (ref 45–117)
ALT SERPL-CCNC: 25 U/L (ref 12–78)
ANION GAP SERPL CALC-SCNC: 3 MMOL/L (ref 5–15)
AST SERPL-CCNC: 18 U/L (ref 15–37)
BASOPHILS # BLD: 0.1 K/UL (ref 0–0.1)
BASOPHILS NFR BLD: 1 % (ref 0–1)
BILIRUB SERPL-MCNC: 0.4 MG/DL (ref 0.2–1)
BUN SERPL-MCNC: 7 MG/DL (ref 6–20)
BUN/CREAT SERPL: 10 (ref 12–20)
CALCIUM SERPL-MCNC: 9.1 MG/DL (ref 8.5–10.1)
CHLORIDE SERPL-SCNC: 109 MMOL/L (ref 97–108)
CHOLEST SERPL-MCNC: 202 MG/DL
CO2 SERPL-SCNC: 26 MMOL/L (ref 21–32)
COMMENT, HOLDF: NORMAL
CREAT SERPL-MCNC: 0.68 MG/DL (ref 0.7–1.3)
CRP SERPL-MCNC: <0.29 MG/DL (ref 0–0.6)
DIFFERENTIAL METHOD BLD: ABNORMAL
EOSINOPHIL # BLD: 0.2 K/UL (ref 0–0.4)
EOSINOPHIL NFR BLD: 3 % (ref 0–7)
ERYTHROCYTE [DISTWIDTH] IN BLOOD BY AUTOMATED COUNT: 13.2 % (ref 11.5–14.5)
EST. AVERAGE GLUCOSE BLD GHB EST-MCNC: 140 MG/DL
FOLATE SERPL-MCNC: 12.8 NG/ML (ref 5–21)
GLOBULIN SER CALC-MCNC: 3.8 G/DL (ref 2–4)
GLUCOSE BLD STRIP.AUTO-MCNC: 127 MG/DL (ref 65–117)
GLUCOSE BLD STRIP.AUTO-MCNC: 132 MG/DL (ref 65–117)
GLUCOSE BLD STRIP.AUTO-MCNC: 134 MG/DL (ref 65–117)
GLUCOSE BLD STRIP.AUTO-MCNC: 166 MG/DL (ref 65–117)
GLUCOSE SERPL-MCNC: 152 MG/DL (ref 65–100)
HBA1C MFR BLD: 6.5 % (ref 4–5.6)
HCT VFR BLD AUTO: 42.8 % (ref 36.6–50.3)
HDLC SERPL-MCNC: 43 MG/DL
HDLC SERPL: 4.7 {RATIO} (ref 0–5)
HGB BLD-MCNC: 14.5 G/DL (ref 12.1–17)
IMM GRANULOCYTES # BLD AUTO: 0 K/UL (ref 0–0.04)
IMM GRANULOCYTES NFR BLD AUTO: 0 % (ref 0–0.5)
LDLC SERPL CALC-MCNC: 134.8 MG/DL (ref 0–100)
LYMPHOCYTES # BLD: 4.2 K/UL (ref 0.8–3.5)
LYMPHOCYTES NFR BLD: 59 % (ref 12–49)
MAGNESIUM SERPL-MCNC: 2 MG/DL (ref 1.6–2.4)
MCH RBC QN AUTO: 31 PG (ref 26–34)
MCHC RBC AUTO-ENTMCNC: 33.9 G/DL (ref 30–36.5)
MCV RBC AUTO: 91.6 FL (ref 80–99)
MONOCYTES # BLD: 0.6 K/UL (ref 0–1)
MONOCYTES NFR BLD: 9 % (ref 5–13)
NEUTS SEG # BLD: 2 K/UL (ref 1.8–8)
NEUTS SEG NFR BLD: 28 % (ref 32–75)
NRBC # BLD: 0 K/UL (ref 0–0.01)
NRBC BLD-RTO: 0 PER 100 WBC
PHOSPHATE SERPL-MCNC: 3.1 MG/DL (ref 2.6–4.7)
PLATELET # BLD AUTO: 172 K/UL (ref 150–400)
PMV BLD AUTO: 11.2 FL (ref 8.9–12.9)
POTASSIUM SERPL-SCNC: 3.6 MMOL/L (ref 3.5–5.1)
PROT SERPL-MCNC: 6.6 G/DL (ref 6.4–8.2)
RBC # BLD AUTO: 4.67 M/UL (ref 4.1–5.7)
SAMPLES BEING HELD,HOLD: NORMAL
SERVICE CMNT-IMP: ABNORMAL
SODIUM SERPL-SCNC: 138 MMOL/L (ref 136–145)
TRIGL SERPL-MCNC: 121 MG/DL (ref ?–150)
TROPONIN-HIGH SENSITIVITY: 23 NG/L (ref 0–76)
TROPONIN-HIGH SENSITIVITY: 26 NG/L (ref 0–76)
TSH SERPL DL<=0.05 MIU/L-ACNC: 0.54 UIU/ML (ref 0.36–3.74)
VIT B12 SERPL-MCNC: 361 PG/ML (ref 193–986)
VLDLC SERPL CALC-MCNC: 24.2 MG/DL
WBC # BLD AUTO: 7.1 K/UL (ref 4.1–11.1)

## 2021-10-30 PROCEDURE — 70551 MRI BRAIN STEM W/O DYE: CPT

## 2021-10-30 PROCEDURE — 65660000000 HC RM CCU STEPDOWN

## 2021-10-30 PROCEDURE — 84484 ASSAY OF TROPONIN QUANT: CPT

## 2021-10-30 PROCEDURE — 84100 ASSAY OF PHOSPHORUS: CPT

## 2021-10-30 PROCEDURE — 74011250636 HC RX REV CODE- 250/636: Performed by: INTERNAL MEDICINE

## 2021-10-30 PROCEDURE — 85025 COMPLETE CBC W/AUTO DIFF WBC: CPT

## 2021-10-30 PROCEDURE — 74011000250 HC RX REV CODE- 250: Performed by: INTERNAL MEDICINE

## 2021-10-30 PROCEDURE — 74011250637 HC RX REV CODE- 250/637: Performed by: INTERNAL MEDICINE

## 2021-10-30 PROCEDURE — 80061 LIPID PANEL: CPT

## 2021-10-30 PROCEDURE — 82962 GLUCOSE BLOOD TEST: CPT

## 2021-10-30 PROCEDURE — 97116 GAIT TRAINING THERAPY: CPT

## 2021-10-30 PROCEDURE — 82746 ASSAY OF FOLIC ACID SERUM: CPT

## 2021-10-30 PROCEDURE — 83735 ASSAY OF MAGNESIUM: CPT

## 2021-10-30 PROCEDURE — 84443 ASSAY THYROID STIM HORMONE: CPT

## 2021-10-30 PROCEDURE — 82607 VITAMIN B-12: CPT

## 2021-10-30 PROCEDURE — 97112 NEUROMUSCULAR REEDUCATION: CPT

## 2021-10-30 PROCEDURE — 99222 1ST HOSP IP/OBS MODERATE 55: CPT | Performed by: PSYCHIATRY & NEUROLOGY

## 2021-10-30 PROCEDURE — 36600 WITHDRAWAL OF ARTERIAL BLOOD: CPT

## 2021-10-30 PROCEDURE — 97162 PT EVAL MOD COMPLEX 30 MIN: CPT

## 2021-10-30 PROCEDURE — 36415 COLL VENOUS BLD VENIPUNCTURE: CPT

## 2021-10-30 PROCEDURE — 80053 COMPREHEN METABOLIC PANEL: CPT

## 2021-10-30 PROCEDURE — 86140 C-REACTIVE PROTEIN: CPT

## 2021-10-30 PROCEDURE — 97165 OT EVAL LOW COMPLEX 30 MIN: CPT

## 2021-10-30 PROCEDURE — 83036 HEMOGLOBIN GLYCOSYLATED A1C: CPT

## 2021-10-30 RX ORDER — OXYCODONE HYDROCHLORIDE 5 MG/1
5 TABLET ORAL
Status: DISCONTINUED | OUTPATIENT
Start: 2021-10-30 | End: 2021-11-07

## 2021-10-30 RX ORDER — IBUPROFEN 200 MG
1 TABLET ORAL DAILY
Status: DISCONTINUED | OUTPATIENT
Start: 2021-10-31 | End: 2021-10-30

## 2021-10-30 RX ORDER — LORAZEPAM 2 MG/ML
0.5 INJECTION INTRAMUSCULAR ONCE
Status: COMPLETED | OUTPATIENT
Start: 2021-10-30 | End: 2021-10-30

## 2021-10-30 RX ADMIN — METHOCARBAMOL 750 MG: 750 TABLET ORAL at 10:36

## 2021-10-30 RX ADMIN — APIXABAN 5 MG: 5 TABLET, FILM COATED ORAL at 22:07

## 2021-10-30 RX ADMIN — CARVEDILOL 25 MG: 12.5 TABLET, FILM COATED ORAL at 16:57

## 2021-10-30 RX ADMIN — LISINOPRIL 10 MG: 10 TABLET ORAL at 10:36

## 2021-10-30 RX ADMIN — OXYCODONE 5 MG: 5 TABLET ORAL at 16:57

## 2021-10-30 RX ADMIN — FUROSEMIDE 40 MG: 40 TABLET ORAL at 10:30

## 2021-10-30 RX ADMIN — METHOCARBAMOL 750 MG: 750 TABLET ORAL at 22:07

## 2021-10-30 RX ADMIN — PANTOPRAZOLE SODIUM 40 MG: 40 TABLET, DELAYED RELEASE ORAL at 10:30

## 2021-10-30 RX ADMIN — IPRATROPIUM BROMIDE AND ALBUTEROL SULFATE 3 ML: .5; 3 SOLUTION RESPIRATORY (INHALATION) at 22:26

## 2021-10-30 RX ADMIN — AMITRIPTYLINE HYDROCHLORIDE 25 MG: 25 TABLET, FILM COATED ORAL at 02:09

## 2021-10-30 RX ADMIN — ATORVASTATIN CALCIUM 80 MG: 40 TABLET, FILM COATED ORAL at 10:30

## 2021-10-30 RX ADMIN — APIXABAN 5 MG: 5 TABLET, FILM COATED ORAL at 14:38

## 2021-10-30 RX ADMIN — SPIRONOLACTONE 25 MG: 25 TABLET ORAL at 10:30

## 2021-10-30 RX ADMIN — METHOCARBAMOL 750 MG: 750 TABLET ORAL at 16:57

## 2021-10-30 RX ADMIN — ASPIRIN 81 MG CHEWABLE TABLET 81 MG: 81 TABLET CHEWABLE at 10:29

## 2021-10-30 RX ADMIN — LORAZEPAM 0.5 MG: 2 INJECTION INTRAMUSCULAR; INTRAVENOUS at 10:30

## 2021-10-30 RX ADMIN — AMITRIPTYLINE HYDROCHLORIDE 25 MG: 25 TABLET, FILM COATED ORAL at 22:07

## 2021-10-30 NOTE — PROGRESS NOTES
Hospitalist Progress Note  Jonny Charles MD  Answering service: [phone] from in house phone      Date of Service:  10/30/2021  NAME:  Brent Noonan  :  1950  MRN:  [id number]    Admission Summary:   71M p/w suspected CVA    Interval history / Subjective:   Patient seen and examined at bedside, feels ok, not able to offer much meaningful history, able to move all exts. MRI pending. Assessment & Plan:     #. Acute CVAs:   - CT head: foci of acute/subacute infarcts in R- occipital area. CTA head/neck: No sig stenosis  - , A1c 7.9. Statin 'high intensity', aspirin. MRI brain pending, TTE with bubble pending  - Neuro cs pending. #. CAD: s/p CABG. stable, home regimen, monitor  #. Diastolic CHF: stable, continue home meds/diuretics. #. HTN: chronic, stable, Home regimen, PRN BP meds. Monitor  #. HLD: chronic, Stable, Home regimen  #. DM2: A1c 7.9, SSI, AccuChecks, monitor  #. COPD: no exacerbation, continue home regimen, DuoNebs PRN. #. Chronic nicotine dependence: counseled on health risks of nicotine. Nicotine patch daily  #. smokes marijuana. UDS +ve. Pt counseled on harmful effects. Encouraged to quit. Monitor   #. PEs: chronic- continue Eliquis. Code status: Full  DVT prophylaxis: Eliquis  Care Plan discussed with: Patient/Family and Nurse  Disposition: TBD 1-2days     Hospital Problems  Date Reviewed: 10/29/2021        Codes Class Noted POA    * (Principal) Acute CVA (cerebrovascular accident) [facility]) ICD-10-CM: I63.9  ICD-9-CM: 434.91  10/29/2021 Yes            Review of Systems:   Pertinent items are mentioned in interval history. Vital Signs:    Last 24hrs VS reviewed since prior progress note.  Most recent are:  Visit Vitals  BP (!) 157/91 (BP 1 Location: Right upper arm, BP Patient Position: At rest)   Pulse 61   Temp 98.3 °F (36.8 °C)   Resp 20   Ht 6' (1.829 m)   Wt 102.3 kg (225 lb 8.5 oz)   SpO2 97%   BMI 30.59 kg/m²       No intake or output data in the 24 hours ending 10/30/21 0841     Physical Examination:   Evaluated face to face and examined 10/30/21    General:  Alert, partially oriented, No acute distress. Elderly BM. Card:  S1, S2, No murmurs, good peripheral perfusion, warm peripheries  Resp:  No accessory muscle use, Good AE, no wheezes. no crepitations  Abd:  Soft, non-tender, non-distended, BS+  Extremities:  No cyanosis or clubbing, no significant edema  Neuro:  alert, partially oriented, follows commands, speech understood   Psych:  fair/poor insight, not agitated. Data Review:    Review and/or order of clinical lab test  Review and/or order of tests in the radiology section of CPT  Review and/or order of tests in the medicine section of CPT  Labs:     Recent Labs     10/30/21  0718 10/29/21  1523   WBC 7.1 7.2   HGB 14.5 16.5   HCT 42.8 49.8    180     Recent Labs     10/30/21  0718 10/29/21  1808    139   K 3.6 4.2   * 109*   CO2 26 26   BUN 7 10   CREA 0.68* 0.78   * 87   CA 9.1 9.4   MG 2.0 2.0   PHOS 3.1  --      Recent Labs     10/30/21  0718 10/29/21  1808   ALT 25 29   AP 80 89   TBILI 0.4 0.7   TP 6.6 7.5   ALB 2.8* 3.3*   GLOB 3.8 4.2*     No results for input(s): INR, PTP, APTT, INREXT in the last 72 hours. No results for input(s): FE, TIBC, PSAT, FERR in the last 72 hours. No results found for: FOL, RBCF   No results for input(s): PH, PCO2, PO2 in the last 72 hours. No results for input(s): CPK, CKNDX, TROIQ in the last 72 hours.     No lab exists for component: CPKMB  Lab Results   Component Value Date/Time    Cholesterol, total 202 (H) 10/30/2021 07:18 AM    HDL Cholesterol 43 10/30/2021 07:18 AM    LDL, calculated 134.8 (H) 10/30/2021 07:18 AM    Triglyceride 121 10/30/2021 07:18 AM    CHOL/HDL Ratio 4.7 10/30/2021 07:18 AM     Lab Results   Component Value Date/Time    Glucose (POC) 132 (H) 10/30/2021 07:39 AM    Glucose (POC) 134 (H) 10/30/2021 02:08 AM Glucose (POC) 170 (H) 10/29/2021 11:16 PM    Glucose (POC) 89 10/29/2021 10:07 PM    Glucose (POC) 66 10/29/2021 09:52 PM     Lab Results   Component Value Date/Time    Color DARK YELLOW 10/29/2021 03:23 PM    Appearance CLEAR 10/29/2021 03:23 PM    Specific gravity 1.030 10/29/2021 03:23 PM    Specific gravity 1.015 01/02/2019 03:45 PM    pH (UA) 5.5 10/29/2021 03:23 PM    Protein Negative 10/29/2021 03:23 PM    Glucose Negative 10/29/2021 03:23 PM    Ketone TRACE (A) 10/29/2021 03:23 PM    Bilirubin Negative 10/29/2021 03:23 PM    Urobilinogen 1.0 10/29/2021 03:23 PM    Nitrites Negative 10/29/2021 03:23 PM    Leukocyte Esterase SMALL (A) 10/29/2021 03:23 PM    Epithelial cells FEW 10/29/2021 03:23 PM    Bacteria Negative 10/29/2021 03:23 PM    WBC 10-20 10/29/2021 03:23 PM    RBC 0-5 10/29/2021 03:23 PM     Medications Reviewed:     Current Facility-Administered Medications   Medication Dose Route Frequency    albuterol-ipratropium (DUO-NEB) 2.5 MG-0.5 MG/3 ML  3 mL Nebulization Q6H PRN    amitriptyline (ELAVIL) tablet 25 mg  25 mg Oral QHS    apixaban (ELIQUIS) tablet 2.5 mg  2.5 mg Oral BID    aspirin chewable tablet 81 mg  81 mg Oral DAILY    atorvastatin (LIPITOR) tablet 80 mg  80 mg Oral DAILY    carvediloL (COREG) tablet 25 mg  25 mg Oral BID WITH MEALS    furosemide (LASIX) tablet 40 mg  40 mg Oral DAILY    lisinopriL (PRINIVIL, ZESTRIL) tablet 10 mg  10 mg Oral DAILY    nicotine (NICODERM CQ) 21 mg/24 hr patch 1 Patch  1 Patch TransDERmal Q24H    pantoprazole (PROTONIX) tablet 40 mg  40 mg Oral ACB    spironolactone (ALDACTONE) tablet 25 mg  25 mg Oral DAILY    tamsulosin (FLOMAX) capsule 0.4 mg  0.4 mg Oral DAILY    methocarbamoL (ROBAXIN) tablet 750 mg  750 mg Oral TID    acetaminophen (TYLENOL) tablet 650 mg  650 mg Oral Q4H PRN    Or    acetaminophen (TYLENOL) solution 650 mg  650 mg Per NG tube Q4H PRN    Or    acetaminophen (TYLENOL) suppository 650 mg  650 mg Rectal Q4H PRN    ondansetron (ZOFRAN) injection 4 mg  4 mg IntraVENous Q6H PRN    bisacodyL (DULCOLAX) tablet 5 mg  5 mg Oral DAILY PRN    insulin lispro (HUMALOG) injection   SubCUTAneous AC&HS    glucose chewable tablet 16 g  4 Tablet Oral PRN    dextrose (D50W) injection syrg 12.5-25 g  12.5-25 g IntraVENous PRN    glucagon (GLUCAGEN) injection 1 mg  1 mg IntraMUSCular PRN   ______________________________________________________________________  EXPECTED LENGTH OF STAY: - - -  ACTUAL LENGTH OF STAY:          1               Ahmet Butler MD

## 2021-10-30 NOTE — H&P
[address]  HISTORY AND PHYSICAL    Name:  Cathy Scott  MR#:  [id number]  :  1950  ACCOUNT #:  [de-identified]  ADMIT DATE:  10/29/2021      The patient was seen, evaluated, and admitted by me on 10/29/2021. PRIMARY CARE PHYSICIAN:  Wilton Gu NP    SOURCE OF INFORMATION:  The patient and review of ED and old electronic medical records. CHIEF COMPLAINT:  Weakness. HISTORY OF PRESENT ILLNESS:  This is a 67-year-old man with a past medical history significant for coronary artery disease status post CABG, dyslipidemia, hypertension, type 2 diabetes, chronic congestive heart failure with preserved ejection fraction, thromboembolism on Eliquis for anticoagulation, COPD, who was in his usual state of health until the day of presentation at the emergency room when the patient developed weakness. The weakness was described as a generalized weakness. The patient also complained of shortness of breath. While the patient's symptoms got worse today, but the symptoms have been going on since last week. The symptoms started after the patient was helping his friend to chop wood. The patient was brought to the emergency room for further evaluation. When the patient arrived at the emergency room, CT scan of the head was obtained. The CT scan shows evidence of acute CVA. He was subsequently referred to the hospitalist service for evaluation for admission. The patient has no prior history of CVA. He was last admitted to the hospital from 2019 to 2019. The patient was admitted for evaluation and treatment of syncope. PAST MEDICAL HISTORY:  Coronary artery disease status post CABG, dyslipidemia, hypertension, type 2 diabetes, chronic congestive heart failure with preserved ejection fraction, COPD. ALLERGIES:  NO KNOWN DRUG ALLERGIES. MEDICATIONS:  1. Tylenol 650 mg every 6 hours as needed for pain.   2.  Albuterol 90 mcg 2 puffs by inhalation every 4 hours as needed for wheezing. 3.  Elavil 25 mg daily at bedtime. 4.  Eliquis 2.5 mg twice daily. 5.  Aspirin 81 mg daily. 6.  Lipitor 80 mg daily. 7.  Coreg 25 mg twice daily. 8.  Flexeril 10 mg 3 times daily. 9.  Lasix 40 mg daily. 10.  Hydrochlorothiazide 25 mg daily. 11.  Lantus insulin 30 units subcutaneously daily. 12.  Sliding scale with insulin coverage. 13. BiDil 20/37.5 one tablet 3 times daily. 14.  Lisinopril 10 mg daily. 15.  Glucophage 1000 mg twice daily. 16.  Robaxin 500 mg twice daily. 17.  Aldactone 25 mg daily. 18.  Flomax 0.4 mg daily. FAMILY HISTORY:  This was reviewed. His father had hypertension. PAST SURGICAL HISTORY:  This is significant for CABG. SOCIAL HISTORY:  The patient smokes about a pack of cigarettes daily. Denies alcohol abuse. REVIEW OF SYSTEMS:  HEAD, EYES, EARS, NOSE, AND THROAT:  This is positive for dizziness. No headache, no blurring of vision, no photophobia. RESPIRATORY SYSTEM:  This is positive for cough and shortness of breath. No hemoptysis. CARDIOVASCULAR SYSTEM:  This is positive for chest pain. No orthopnea, no palpitation. GASTROINTESTINAL SYSTEM:  No nausea or vomiting. No diarrhea. No constipation. GENITOURINARY SYSTEM:  No dysuria, no urgency, and no frequency. All other systems are reviewed and they are negative. PHYSICAL EXAMINATION:  GENERAL APPEARANCE:  The patient appeared ill, in moderate distress. VITAL SIGNS:  On arrival at the emergency room, temperature 97.7, pulse 55, respiratory rate 16, blood pressure 144/90, oxygen saturation 99% on room air. HEENT:  Head:  Normocephalic, atraumatic. Eyes:  Normal eye movement. No redness, no drainage, no discharge. Ears:  Normal external ears with no evidence of drainage. Nose:  No deformity, no drainage. Mouth and Throat:  No visible oral lesion. NECK:  Neck is supple. No JVD, no thyromegaly. CHEST:  Clear breath sounds. No wheezing, no crackles.   HEART:  Normal S1 and S2, regular. No clinically appreciable murmur. ABDOMEN:  Soft, nontender. Normal bowel sounds. CNS:  Alert and oriented x3. No gross focal neurological deficit. EXTREMITIES:  No edema. Pulses 2+ bilaterally. MUSCULOSKELETAL SYSTEM:  No evidence of joint deformity or swelling. SKIN:  No active skin lesions seen in the exposed part of the body. PSYCHIATRY:  Normal mood and affect. LYMPHATIC SYSTEM:  No cervical lymphadenopathy. DIAGNOSTIC DATA:  EKG shows sinus bradycardia, and nonspecific ST and T-waves abnormalities. Chest x-ray, no acute cardiopulmonary process. CT scan of the head without contrast shows foci of acute/subacute ischemia noted in the right occipital lobe. CTA of the head and neck, no major vascular occlusion. LABORATORY DATA:  Hematology:  WBC 7.2, hemoglobin at 16.5, hematocrit 49.8, platelets 624. Urinalysis: This is significant for negative nitrite, small leukocyte esterase, negative bacteria, and negative blood. High-sensitivity troponin level 22. Magnesium level 2.0. Chemistry:  Sodium 139, potassium 4.2, chloride 109, CO2 26, glucose 87, BUN 10, creatinine 0.78, calcium 9.4, total bilirubin 0.7, ALT 29, AST 23, alkaline phosphatase 89, total protein at 7.5, albumin level 3.3, globulin at 4.2. Pro-BNP level 285. Urine Drug Screen: This is positive for THC. ASSESSMENT:  1. Acute cerebrovascular accident. 2.  Coronary artery disease, status post coronary artery bypass grafting. 3.  Dyslipidemia. 4.  Hypertension. 5.  Type 2 diabetes. 6.  Chronic congestive heart failure with preserved ejection fraction. 7.  Thromboembolism. 8.  Tobacco abuse. 9.  Marijuana use. 10.  Chronic obstructive pulmonary disease. PLAN:  1. Acute CVA. We will admit the patient for further evaluation and treatment. We will obtain an MRI of the brain, echocardiogram.  We will check lipid profile. We will also check hemoglobin A1c level.   Inpatient Neurology consult will be requested to assist in further evaluation and treatment. We will check C-reactive protein level to evaluate the patient for systemic inflammation. We will also check H41 and folic acid level. We will await further recommendation from the inpatient neurologist.  2.  Coronary artery disease, status post CABG. We will continue with preadmission cardiac medication. We will check a TSH level. 3.  Dyslipidemia. We will continue with Lipitor. We will check lipid profile as stated above. 4.  Hypertension. We will resume preadmission medication and monitor the patient's blood pressure closely. 5.  Type 2 diabetes. The patient will be placed on sliding scale with insulin coverage. We will check hemoglobin A1c level, if one has not been done recently. 6.  Chronic congestive heart failure with preserved ejection fraction. We will continue with Lasix. The patient does not appear to be in acute exacerbation of congestive heart failure. 7.  Thromboembolism. The patient has history of pulmonary embolism and DVT. We will continue with Eliquis for anticoagulation. 8.  Tobacco abuse. The patient advised to quit smoking. We will place the patient on Nicoderm patch. 9.  Marijuana use. The patient also advised to quit. We will carry out supportive treatment. 10.  COPD. We will place the patient on DuoNeb as needed. 11.  Other Issues:  Code Status: The patient is a full code. The patient is already on Eliquis for anticoagulation for pulmonary embolism, because of that, there is no need for DVT prophylaxis. FUNCTIONAL STATUS PRIOR TO ADMISSION:  The patient came from home. The patient is ambulatory with no assistive device. COVID PRECAUTION:  The patient was wearing a face mask. I was wearing a face mask and gloves for this patient's encounter.         MD CLIVE [email or web address]  D:  10/30/2021 6:46  T:  10/30/2021 7:53  JOB #:  [id number]  CC:  Dorota Schultz NP

## 2021-10-30 NOTE — PROGRESS NOTES
Problem: Mobility Impaired (Adult and Pediatric)  Goal: *Acute Goals and Plan of Care (Insert Text)  Description: FUNCTIONAL STATUS PRIOR TO ADMISSION: Patient was independent and active without use of DME.    HOME SUPPORT PRIOR TO ADMISSION: The patient lived with three roommates. Questionable living situation? Physical Therapy Goals  Initiated 10/30/2021  1. Patient will move from supine to sit and sit to supine  in bed with modified independence within 7 day(s). 2.  Patient will transfer from bed to chair and chair to bed with modified independence using the least restrictive device within 7 day(s). 3.  Patient will perform sit to stand with modified independence within 7 day(s). 4.  Patient will ambulate with modified independence for 150 feet with the least restrictive device within 7 day(s). 5.  Patient will ascend/descend 12 stairs with 1 handrail(s) with modified independence within 7 day(s). 10/30/2021 1259 by Freddy Hadley, PT  Outcome: Progressing Towards Goal       PHYSICAL THERAPY EVALUATION  Patient: Angélica Elizalde (80 y.o. male)  Date: 10/30/2021  Primary Diagnosis: Acute CVA (cerebrovascular accident) [facility]) [I63.9]        Precautions:  Fall    ASSESSMENT  Based on the objective data described below, the patient presents with mild balance deficits, pain in lower back, decreased endurance,  and overall limited functional mobility s/p acute CVA  Patient is likely close to his baseline mobility level, however, would benefit from PT to maximize safety and independence with functional tasks. Patient presenting with 4/5 strength in bilateral LEs, no noted difference between L and R LEs. Patient scored 50/56 on [place] balance scale which is a low fall risk. Patient amb 100 ft without AD progressing from CGA to SBA. No loss of balance noted during walk.      Current Level of Function Impacting Discharge (mobility/balance): Bed mobility SBA/supervision, Transfers SBA/supervision, Gait x 100 ft without AD with CGA progressing to SBA-no loss of balance noted    Functional Outcome Measure: The patient scored 50 on the [place] Balance outcome measure which is indicative of low fall risk. Other factors to consider for discharge: Unclear living situation? Patient will benefit from skilled therapy intervention to address the above noted impairments. PLAN :  Recommendations and Planned Interventions: transfer training, gait training, therapeutic exercises, neuromuscular re-education, patient and family training/education, and therapeutic activities      Frequency/Duration: Patient will be followed by physical therapy:  5 times a week to address goals. Recommendation for discharge: (in order for the patient to meet his/her long term goals)  Outpatient physical therapy follow up recommended for high level balance neuro re-education    This discharge recommendation:  Has not yet been discussed the attending provider and/or case management    IF patient discharges home will need the following DME: to be determined (TBD)         SUBJECTIVE:   Patient stated Do you know when I can leave?     OBJECTIVE DATA SUMMARY:   HISTORY:    Past Medical History:   Diagnosis Date    CAD (coronary artery disease)     [facility] [address]     GERD (gastroesophageal reflux disease)     Heart failure ([facility] [address]     Hypertension      Past Surgical History:   Procedure Laterality Date    HX CORONARY ARTERY BYPASS GRAFT      4 way       Personal factors and/or comorbidities impacting plan of care: Patient states he lives with three people in two level home. He states he isn't sure if he can use an AD due to small spaces. RN reports questionable living situation? Homeless?     Home Situation  Home Environment: Private residence  # Steps to Enter: 3  Rails to Enter: Yes  Wheelchair Ramp: No  One/Two Story Residence: Two story  Living Alone: No  Support Systems: Friend/Neighbor  Patient Expects to be Discharged to[de-identified] House  Current DME Used/Available at Home: Walker, rolling, Cane, straight (fearful he can't use in home due to small spaces)    EXAMINATION/PRESENTATION/DECISION MAKING:   Critical Behavior:  Neurologic State: Alert  Orientation Level: Oriented X4  Cognition: Follows commands  Safety/Judgement: Awareness of environment  Hearing: Auditory  Auditory Impairment: None    Range Of Motion:  AROM: Within functional limits (B LEs)        Strength:    Strength: Generally decreased, functional (B LEs; no strength differences noted between L and R)                    Tone & Sensation:   Tone: Normal              Sensation: Intact               Coordination:  Coordination: Generally decreased, functional (LEs, slow to perform heel to shin)  Vision:   Tracking: Able to track stimulus in all quadrants w/o difficulty  Diplopia: No  Functional Mobility:  Bed Mobility:  Rolling: Stand-by assistance;Supervision  Supine to Sit: Stand-by assistance;Supervision  Sit to Supine: Stand-by assistance;Supervision  Scooting: Supervision  Transfers:  Sit to Stand: Stand-by assistance;Supervision;Contact guard assistance  Stand to Sit: Stand-by assistance;Supervision                       Balance:   Sitting: Intact  Standing: Intact; With support  Ambulation/Gait Training:  Distance (ft): 100 Feet (ft)  Assistive Device: Gait belt; Other (comment) (no AD)  Ambulation - Level of Assistance: Stand-by assistance;Contact guard assistance (CGA progressing to SBA)     Gait Description (WDL): Exceptions to [facility]           Base of Support: Widened     Speed/Kina: Slow  Step Length: Left shortened;Right shortened                   Functional Measure:  [place] Balance Test:    Sitting to Standin  Standing Unsupported: 4  Sitting with Back Unsupported: 4  Standing to Sittin  Transfers: 4  Standing Unsupported with Eyes Closed: 3  Standing Unsupported with Feet Together: 4  Reach Forward with Outstretched Arm: 4   Object: 4  Turn to Look Over Shoulders: 4  Turn 360 Degrees: 4  Alternate Foot on Step/Stool: 4  Standing Unsupported One Foot in Front: 1  Stand on One Le  Total: 50/56         56=Maximum possible score;   0-20=High fall risk  21-40=Moderate fall risk   41-56=Low fall risk               Physical Therapy Evaluation Charge Determination   History Examination Presentation Decision-Making   MEDIUM  Complexity : 1-2 comorbidities / personal factors will impact the outcome/ POC  MEDIUM Complexity : 3 Standardized tests and measures addressing body structure, function, activity limitation and / or participation in recreation  MEDIUM Complexity : Evolving with changing characteristics  MEDIUM Complexity : FOTO score of 26-74      Based on the above components, the patient evaluation is determined to be of the following complexity level: MEDIUM    Pain Rating:  Patient with c/o of lower back pain, but unable to rate    Activity Tolerance:   Good    After treatment patient left in no apparent distress:   Supine in bed, Call bell within reach, and Bed / chair alarm activated    COMMUNICATION/EDUCATION:   The patients plan of care was discussed with: Occupational therapist and Registered nurse. Patient/family agree to work toward stated goals and plan of care.     Thank you for this referral.  Savanah Florence, PT   Time Calculation: 27 mins

## 2021-10-30 NOTE — PROGRESS NOTES
Problem: Self Care Deficits Care Plan (Adult)  Goal: *Acute Goals and Plan of Care (Insert Text)  Description: FUNCTIONAL STATUS PRIOR TO ADMISSION: I PTA, no drivers license; has  who drives him to app, incarcerated x 25 yrs until recently per chart    HOME SUPPORT: lives w/3 others in small home; walk in shower; has \"\" whom he called when he needed assist to hospital 10-2 this year. Occupational Therapy Goals  Initiated 10/30/2021   1. Patient will perform scanning environment with during meals with modified independence within 7 day(s). 2.  Patient will perform simple home management with safe environmental scanning with modified independence within 7 day(s). 3  Patient will participate in upper extremity therapeutic coordination exercise/activities with supervision/set-up within 7 day(s). 4.  Patient will utilize energy conservation, fall prevention techniques during functional activities with verbal cues within 7 day(s). 5.  Patient will improve their Fugl Marlow score by 5 points in prep for ADLs within 7 days. 6.  Patient will verbalize understanding of BE FAST and personal risk factors for CVA in 7 days       Outcome: Progressing Towards Goal  OCCUPATIONAL THERAPY EVALUATION  Patient: Lisa Kinney (22 y.o. male)  Date: 10/30/2021  Primary Diagnosis: Acute CVA (cerebrovascular accident) [facility]) [I63.9]       Precautions:  Fall, Spinal (precautions for back pain; Open heart surgery 2017)        ASSESSMENT  P. Patient received to  ER 10-29 after c/o weakness, L foot tripping me up and new  back pain because I cut up those logs like I'm a young man but I am not. \" Cooperative with all aspect of assessment for CVA, patient most impaired by L visual  Inattention. Line Bi-section impaired mid-line and to L. \"There are no more lines. \"  Unable to cross out scattered lines until paper placed fully to R. Able to track and turn head to attend to L, but not doing it consistently;  Receptive to training of compensatory strategies. MoCA cog screening tool used for discharge planning purposes, see paper chart: 16/30 indicative of severe deficits, performs functionally at higher level than this score would indicate. Task attention was limiting factor, as well as visual inattention. Current Level of Function Impacting Discharge (ADLs): S-CGA, limited by 8/10 back pain, decreased dynamic balance when vision is occluded and visual inattention/impairment  to L    Other factors to consider for discharge: Not in home setting where he has physical support                       Current Level of Function Impacting Discharge (ADLs/self-care): S CGA    Functional Outcome Measure: The patient scored Total A-D  Total A-D (Motor Function): 57/66 on the Fugl-Marlow Assessment which is indicative of mild impairment in upper extremity functional status. Much more impaired by L visual field cut/inattention      Patient will benefit from skilled therapy intervention to address the above noted impairments. PLAN :  Recommendations and Planned Interventions: self care training, functional mobility training, therapeutic exercise, balance training, visual/perceptual training, therapeutic activities, cognitive retraining, endurance activities, neuromuscular re-education, patient education, home safety training and family training/education    Frequency/Duration: Patient will be followed by occupational therapy 5 times a week to address goals. Recommendation for discharge: (in order for the patient to meet his/her long term goals)  Outpatient occupational therapy follow up recommended for visual deficits, L UE sensory & coordination deficits, cognition, dynamic balance    This discharge recommendation:  Has been made in collaboration with the attending provider and/or case management    IF patient discharges home will need the following DME: none       SUBJECTIVE:   Patient stated Man there are no lines there.  (Midline and to L of paper)    OBJECTIVE DATA SUMMARY:   HISTORY:   Past Medical History:   Diagnosis Date    CAD (coronary artery disease)     [facility] [address]     GERD (gastroesophageal reflux disease)     Heart failure ([facility] [address]     Hypertension      Past Surgical History:   Procedure Laterality Date    HX CORONARY ARTERY BYPASS GRAFT      4 way     Expanded or extensive additional review of patient history:     Home Situation  Home Environment: Private residence  # Steps to Enter: 3  Rails to Enter: Yes  Wheelchair Ramp: No  One/Two Story Residence: Two story  Living Alone: No  Support Systems: Other (Comment) ((Counselor) Marchstefanie Flow- [phone])  Patient Expects to be Discharged to[de-identified] House  Current DME Used/Available at Home: Cadence Brian, rolling, Cane, straight (fearful he can't use in home due to small spaces)  Tub or Shower Type: Shower    Hand dominance: Right    EXAMINATION OF PERFORMANCE DEFICITS:  Cognitive/Behavioral Status:  Neurologic State: Alert  Orientation Level: Oriented X4;Oriented to person;Oriented to place;Oriented to time;Oriented to situation ([address] know which one\")  Cognition: Follows commands  Perception: Appears intact  Perseveration: No perseveration noted  Safety/Judgement: Decreased insight into deficits; Decreased awareness of need for safety (receptive to learning about visual deficits)    Skin: intact    Edema: none    Hearing: Auditory  Auditory Impairment: None    Vision/Perceptual:    Tracking: Able to track left of midline; Able to track right of midline; Requires cues, head turns, or add eye shifts to track (has difficulty to L this session, turns head to track L)              Visual Fields: Difficulty detecting stimulus  in left lateral quadrant; Difficulty detecting stimulus in left lower quadrant; Difficulty detecting stimulus in left upper quadrant (line bisection impaired at midline fully to L; intact to R )  Diplopia: No    Acuity: Impaired far vision (able to read clock and white board w/increased time to R)    Corrective Lenses: Reading glasses (overall does not see well to L; \"Nothing is there\" often )    Range of Motion:  B UE  AROM: Within functional limits                         Strength:  B UE  Strength: Generally decreased, functional (L UE and L LE, \"Its better but it did not hold me up\")                Coordination:  Coordination: Generally decreased, functional (L UE limited, decreased speed, decreased if cant see it)  Fine Motor Skills-Upper: Left Impaired;Right Intact (mild deficit)    Gross Motor Skills-Upper: Left Impaired;Right Intact (unable to rapaidly alternate pronation/supination On L)    Tone & Sensation:  L impaired; \"Pins and needles\"  Tone: Normal  Sensation: Impaired (L UE and LE  \"Pins and needles\")                      Balance:  Sitting: Intact  Standing: Impaired; Without support  Standing - Static: Good; Unsupported  Standing - Dynamic : Fair;Unsupported (reports he feels \"L leg will trip me up\" device may  help)    Functional Mobility and Transfers for ADLs:  Bed Mobility:  Rolling: Stand-by assistance (c/o 8/10 back pain; new; \"I was cutting heavy logs\" )  Supine to Sit: Stand-by assistance (c/o 8/10 pain in back from recent overuse injury; RN notifie)  Sit to Supine: Stand-by assistance  Scooting: Stand-by assistance    Transfers:  Sit to Stand: Stand-by assistance  Stand to Sit: Stand-by assistance (8/10 P! not improved with standing, did not get worse w/ADLs)  Bed to Chair: Stand-by assistance;Supervision  Toilet Transfer : Supervision; Additional time  Shower Transfer:  (uses walk in shower at home)    ADL Assessment:  Feeding: Stand-by assistance (cues to L visual field, items to L fully unseen )    Oral Facial Hygiene/Grooming: Stand-by assistance    Bathing: Stand-by assistance;Contact guard assistance (depending on pain level)    Upper Body Dressing: Supervision (spont.  uses L UE, decreased bal back & R when vision obscur)    Lower Body Dressing: Supervision; Additional time (back pain noted consistent w/ADLs; may benefit from ice/heat)    Toileting: Stand by assistance; Additional time; Adaptive equipment (grab bar used to stand from toilet)                ADL Intervention and task modifications:     Patient instructed and indicated understanding the benefits of maintaining activity tolerance, functional mobility, and independence with self care tasks during acute stay  to ensure safe return home and to baseline. Encouraged patient to increase frequency and duration OOB, be out of bed for all meals, perform daily ADLs (as approved by RN/MD regarding bathing etc), and performing functional mobility to/from bathroom. Cognitive Retraining  Safety/Judgement: Decreased insight into deficits; Decreased awareness of need for safety (receptive to learning about visual deficits)      Functional Measure:  Fugl-Marlow Assessment of Motor Recovery after Stroke:   Reflex Activity  Flexors/Biceps/Fingers: Can be elicited  Extensors/Triceps: Can be elicited  Reflex Subtotal: 4    Volitional Movement Within Synergies  Shoulder Retraction: Full  Shoulder Elevation: Full  Shoulder Abduction (90 degrees): Full  Shoulder External Rotation: Partial  Elbow Flexion: Full  Forearm Supination: Partial  Shoulder Adduction/Internal Rotation: Partial  Elbow Extension: Full  Forearm Pronation: Full  Subtotal: 15    Volitional Movement Mixing Synergies  Hand to Lumbar Spine: Full  Shoulder Flexion (0-90 degrees): Full  Pronation-Supination: Partial  Subtotal: 5    Volitional Movement With Little or No Synergy  Shoulder Abduction (0-90 degrees): Full  Shoulder Flexion ( degrees): Partial  Pronation/Supination: Partial  Subtotal : 4    Normal Reflex Activity  Biceps, Triceps, Finger Flexors:  Full  Subtotal : 2    Upper Extremity Total   Upper Extremity Total: 30    Wrist  Stability at 15 Degree Dorsiflexion: Full  Repeated Dorsiflexion/ Volar Flexion: Full  Stability at 15 Degree Dorsiflexion: Full  Repeated Dorsiflexion/ Volar Flexion: Full  Circumduction: Partial  Wrist Total: 9    Hand  Mass Flexion: Full  Mass Extension: Full  Grasp A: Full  Grasp B: Full  Grasp C: Full  Grasp D: Full  Grasp E: Full  Hand Total: 14    Coordination/Speed  Tremor: None  Dysmetria: Slight  Time: 2-5s  Coordination/Speed Total : 4    Total A-D  Total A-D (Motor Function): 57/66     This is a reliable/valid measure of arm function after a neurological event. It has established value to characterize functional status and for measuring spontaneous and therapy-induced recovery; tests proximal and distal motor functions. Fugl-Marlow Assessment - UE scores recorded between five and 30 days post neurologic event can be used to predict UE recovery at six months post neurologic event. Severe = 0-21 points   Moderately Severe = 22-33 points   Moderate = 34-47 points   Mild = 48-66 points  ALEE Wallace, THOMAS Gomez, & HERBERTH Amado (1992). Measurement of motor recovery after stroke: Outcome assessment and sample size requirements.  Stroke, 23, pp. 4757-8171.   ------------------------------------------------------------------------------------------------------------------------------------------------------------------  MCID:  Stroke:   Ghassan Wharton et al, 2001; n = 171; mean age 79 (5) years; assessed within 16 (12) days of stroke, Acute Stroke)  FMA Motor Scores from Admission to Discharge    10 point increase in FMA Upper Extremity = 1.5 change in discharge FIM    10 point increase in FMA Lower Extremity = 1.9 change in discharge FIM  MDC:   Stroke:   Destin Saldana et al, 2008, n = 14, mean age = 59.9 (14.6) years, assessed on average 14 (6.5) months post stroke, Chronic Stroke)    FMA = 5.2 points for the Upper Extremity portion of the assessment     Occupational Therapy Evaluation Charge Determination   History Examination Decision-Making   LOW Complexity : Brief history review  LOW Complexity : 1-3 performance deficits relating to physical, cognitive , or psychosocial skils that result in activity limitations and / or participation restrictions  MEDIUM Complexity : Patient may present with comorbidities that affect occupational performnce. Miniml to moderate modification of tasks or assistance (eg, physical or verbal ) with assesment(s) is necessary to enable patient to complete evaluation       Based on the above components, the patient evaluation is determined to be of the following complexity level: LOW   Pain Ratin/10 pain in back from recent over use with chair saw and logs \"big ones\"    Activity Tolerance:   Fair, SpO2 stable on RA, requires rest breaks and signs and symptoms of orthostatic hypotension    After treatment patient left in no apparent distress:    Supine in bed, Call bell within reach, Bed / chair alarm activated and Side rails x 3    COMMUNICATION/EDUCATION:   The patients plan of care was discussed with: Registered nurse. PT    Patient was educated regarding his deficit(s) of L sensory and coordination deficits, Visual changes on L,  as this relates to his diagnosis of CVA. He demonstrated Good understanding as evidenced by questions, attention. Patient and/or family was verbally educated on the BE FAST acronym for signs/symptoms of CVA and TIA. BE FAST was written on patient's communication board  for visual education and reinforcement. All questions answered with patient indicating fair understanding. Home safety education was provided and the patient/caregiver indicated understanding., Patient/family have participated as able in goal setting and plan of care. and Patient/family agree to work toward stated goals and plan of care. This patients plan of care is appropriate for delegation to [facility].     Thank you for this referral.  Edilberto De Dios OTR/L  Time Calculation: 55 mins

## 2021-10-30 NOTE — ED NOTES
Bedside and Verbal shift change [facility] ([facility] by Nima Morelos RN (offgoing nurse). Report included the following information SBAR, Kardex, ED Summary, Intake/Output, MAR, Recent Results and Med Rec Status.

## 2021-10-30 NOTE — PROGRESS NOTES
Can not find the on call name in the system to complete the consults.   The name is Dr. Josselyn Long

## 2021-10-30 NOTE — ED NOTES
Bedside and Verbal shift change report given to Amy Burgess  (oncoming nurse) by Jagjit De Leon (offgoing nurse). Report included the following information SBAR and ED Summary.

## 2021-10-30 NOTE — PROGRESS NOTES
Bedside and Verbal shift change report given to Marvin RN (oncoming nurse) by Dave Lindo RN (offgoing nurse). Report included the following information SBAR, Kardex, ED Summary, Intake/Output, MAR, Recent Results, Cardiac Rhythm NSR-SB and Dual Neuro Assessment.

## 2021-10-30 NOTE — PROGRESS NOTES
Occupational Therapy  Chart reviewed, patient currently with MRI; will retry later as able for OT eval. Obi Carbajal OTR/L

## 2021-10-30 NOTE — PROGRESS NOTES
Care Management Interventions  PCP Verified by CM: Yes Giovanny Robles MD)  Last Visit to PCP: 06/28/21  Mode of Transport at Discharge:  (pvt vehicle)  Transition of Care Consult (CM Consult): Discharge Planning  Discharge Durable Medical Equipment: No  Physical Therapy Consult: Yes  Occupational Therapy Consult: Yes  Speech Therapy Consult: No  Support Systems: Other (Comment) ((Counselor) Kylee Auguste- [phone])  Confirm Follow Up Transport:  Freedom Rivera (Counselor))  Discharge Location  Discharge Placement: Home with outpatient services       Reason for Admission:  CVA                     RUR Score:     9%                Plan for utilizing home health:     Not indicated     PCP: First and Last name:  Teddy Santos NP     Name of Practice: [facility]   Are you a current patient: Yes/No: yes   Approximate date of last visit: 6/28/21   Can you participate in a virtual visit with your PCP:                     Current Advanced Directive/Advance Care Plan: Full Code      Healthcare Decision Maker:   Click here to complete [address] including selection of the Healthcare Decision Maker Relationship (ie \"Primary\")                             Transition of Care Plan:     Outpatient therapy once medically stable; follow-up with PCP                     Chart reviewed. CM consult received for assessment and d/c planning. The patient was admitted here 10/29/21with complaints of  SOB since last week after cutting wood for a friend. The patient has a past medical hx of CAD, DM, HTN, and CHF. The patient was seen at [facility] 7/6/21-7/7/21 for aypical chest pain    CM met with patient to introduce self and role. The patient presently resides in a rooming house at [address] [place] [address] (CM will update demographic sheet). Patient previously resided at [facility] ([place] [address]).   Phone number verified at [phone]. The patient contacted his counselor Calvin Archer [phone]). Mr Charmaine Perez assists patient with transportation to medical appointments and prescription refills. [address] uses [address] ([address] and [employer] [address]. The patient's most recent PCP visits have been 6/28/21 and 1/28/21 with Jamey Fine MD at [facility]. The patient is disabled and receives SSI benefits and verifies Medicaid [facility] .  The patient reports having been incarcerated for the past 25 years. CM noted a \"son\" listed in the EMR and asked patient if he had a son. He responded that he did, however did not want him listd as an emergency contact. Therapy is recommending outpatient PT. Patient is in agreement with recommendation. Unit-based CM will continue to follow.     Berna Tobin, [address]

## 2021-10-30 NOTE — CONSULTS
NeuroInterventional Note  We were called for a patient with a right P1 occlusion on CTA. MRI showed late acute versus subacute infarcts along the right PCA territory involving the basal ganglia, thalamus, corpus callosum, and occipital lobe. Evidence of associated petechial hemorrhage. There is no role for endovascular treatment at this point since patient already has a established stroke. Will defer medical management to Neurology.      Grady Steel MD  [facility]

## 2021-10-30 NOTE — ED NOTES
HYPOGLYCEMIC EPISODE DOCUMENTATION    Patient with hypoglycemic episode(s) at 2123 (time) on 10/29/21 (date). BG value(s) pre-treatment 47    Was patient symptomatic? [] yes, [x] no  Patient was treated with the following rescue medications/treatments: [] D50                [] Glucose tablets                [] Glucagon                [x] 4oz juice                [] 6oz reg soda                [] 8oz low fat milk  BG value post-treatment: 47    MD notified, Dr. Sea Diez, who is placing orders for D10 infusion    2207 BG 89.     2316 . Decreased rate of D10.     Once BG treated and value greater than 80mg/dl, pt was provided with the following:  [] snack  [x] meal  Name of MD notified:Junior

## 2021-10-30 NOTE — PROGRESS NOTES
Physical Therapy:    Orders received. Chart reviewed. Per RN, patient preparing to leave floor for MRI this morning. Will f/u as able to complete PT evaluation.     Noelle Vega, PT

## 2021-10-30 NOTE — PROGRESS NOTES
Spiritual Care Assessment/Progress Note  [facility]      NAME: Michelle Zambrano      MRN: [id number]  AGE: 80 y.o.  SEX: male  Mormonism Affiliation: No preference   Language: English     10/30/2021     Total Time (in minutes): 55     Spiritual Assessment begun in [address] through conversation with:         [x]Patient        [] Family    [] Friend(s)        Reason for Consult: Advance medical directive consult     Spiritual beliefs: (Please include comment if needed)     [x] Identifies with a biju tradition:         [] Supported by a biju community:            [] Claims no spiritual orientation:           [] Seeking spiritual identity:                [] Adheres to an individual form of spirituality:           [] Not able to assess:                           Identified resources for coping:      [] Prayer                               [] Music                  [] Guided Imagery     [x] Family/friends                 [] Pet visits     [] Devotional reading                         [] Unknown     [] Other:                                              Interventions offered during this visit: (See comments for more details)    Patient Interventions: Advance medical directive completed, Affirmation of emotions/emotional suffering           Plan of Care:     [] Support spiritual and/or cultural needs    [] Support AMD and/or advance care planning process      [] Support grieving process   [] Coordinate Rites and/or Rituals    [] Coordination with community clergy   [] No spiritual needs identified at this time   [] Detailed Plan of Care below (See Comments)  [] Make referral to Music Therapy  [] Make referral to Pet Therapy     [] Make referral to Addiction services  [] Make referral to [facility]  [] Make referral to Spiritual Care Partner  [] No future visits requested        [x] Follow up visits as needed     Visited pt to facilitate a conversation about completing an AMD. Pt choose to complete an AMD. He names his friend Harman Naidu ([phone]) as MPOA. No secondary named.   Chaplain Joyce MDiv, MS, [facility]

## 2021-10-30 NOTE — CONSULTS
[address]    Name:  Sondra Barth  MR#:  [id number]  :  1950  ACCOUNT #:  [de-identified]  DATE OF SERVICE:  10/30/2021      REQUESTING PHYSICIAN:  Riri Cast MD    REASON FOR EVALUATION:  Stroke. HISTORY:  The patient is a 80-year-old male with history of coronary artery disease, dyslipidemia, hypertension, smoking, type 2 diabetes, thromboembolism, on Eliquis for anticoagulation. Apparently, the patient has not been very compliant with his medications. He was chopping wood with his friend and started to notice that his left arm was getting weaker and he was stumbling on his feet as his left leg was not supporting. He was brought into the emergency department and had CT scan which showed evidence of acute CVA. He had further workup that revealed occluded right posterior cerebral artery along with acute to subacute infarcts in the right posterior cerebral artery distribution. Overall, his strength seems to be improving but not completely back to normal.  Denies any previous strokes. No headache, changes in speech, or swallowing ability. He does report some difficulty with vision in his left hemifield. PAST MEDICAL HISTORY:  As mentioned above. ALLERGIES:  None. HOME MEDICATIONS:  He is supposed to be on Elavil, Eliquis, aspirin, Lipitor, Coreg, Lasix, hydrochlorothiazide, insulin, lisinopril, Glucophage, Aldactone, and Flomax. SOCIAL HISTORY:  Smokes about a pack of cigarettes per day. Denies any alcohol use. REVIEW OF SYSTEMS:  Negative except as mentioned in the HPI. PHYSICAL EXAMINATION:  GENERAL:  The patient is alert, fully oriented. VITAL SIGNS:  Blood pressure 157/91, temperature is 98.3, pulse is 71. HEENT:  Pupils are equal, round, and reactive. He has left-sided visual field deficit. Face is symmetric. Tongue is midline. NEUROLOGIC:  Facial sensation is intact.   Muscle tone and bulk appears normal.  Trace weakness in the  on the left when compared to the right. DTRs hypoactive. Toes downgoing. Sensation grossly intact. Slightly favors his left leg while walking. HEART:  Regular rate and rhythm. CHEST:  Clear. ABDOMEN:  Soft, nontender. Positive bowel sounds. EXTREMITIES:  No edema. LABORATORY DATA:  CBC is unremarkable. Chemistry, sodium 138, potassium 3.6, BUN 7, creatinine 0.68. Lipid profile, triglycerides 121, HDL 43, .8. Hemoglobin A1c 6.5. CTA of the head and neck showed occluded right PCA at the origin. No other large vessel occlusion was identified. MRI scan of the brain shows late acute versus subacute infarcts in the right PCA territory involving the basal ganglia, thalamus, corpus callosum, and occipital lobe. There is evidence of associated petechial hemorrhage as well. Echocardiogram is pending. ASSESSMENT AND PLAN:  A 71-year-old male with multiple vascular risk factors, admitted with acute to subacute-appearing infarcts in the right posterior cerebral artery distribution. He has occluded right PCA likely due to underlying atherosclerosis with superimposed thrombosis. Continue aspirin and statin. Apparently he had not been compliant with his medications and the importance of taking medications regularly was discussed. He should also quit smoking. Given left-sided visual field deficit, he should not drive until evaluated by Ophthalmology. Follow up on echocardiogram.  PT/OT evaluation and initiate discharge planning. Please call with any further questions. Thank you for this consultation.         Yesenia Hansen MD      [id number]  D:  10/30/2021 13:43  T:  10/30/2021 14:58  JOB #:  [id number]

## 2021-10-30 NOTE — PROGRESS NOTES
Advance Care Planning    Advance Care Planning Note  [facility]      Date: 10/29/2021   Received request from IDT member. Conversation participants:  Patient who understands ACP conversation    Goals of Conversation:   Discuss Advance Care planning documents    Healthcare Decision Maker:     Primary Decision Maker: Alicja Libradovandana - [phone]      Summary:  Completed New Documents    Advance Care Planning Documents (Patient Wishes) on file:  Currently on file:  Healthcare Power of /Advance Directive appointment of Health care agent    Assessment:  Visited pt to facilitate a conversation about completing an AMD. Pt choose to complete an AMD. He names his friend Kenia Jorge ([phone]) as MPOA. No secondary named. Interventions:  Provided education on documents for clarity and greater understanding  Discussed and provided education on state decision maker hierarchy  Assisted in the completion of documents according to patient's wishes at this time  Encouraged ongoing ACP conversation with future decision makers and loved ones    Care Preferences Communicated:  Ventilation:   If the patient, in their present state of health, suddenly became very ill and unable to breathe on their own,     the patient would desire the use of a ventilator (breathing machine). If their health worsens and it becomes clear that the chance of recovery is unlikely,       the patient would desire the use of a ventilator (breathing machine). Resuscitation:  In the event the patient's heart stopped as a result of an underlying serious health condition, the patient communicates a preference for      resuscitative attempts (CPR).     Outcomes:  New Advance Directive completed  Returned original document(s) to patient, as well as copies for distribution to appointed agents  Copy of Advance Directive given to staff to scan into medical record  Teach Back Method used to verify the patient/Healthcare Decision Maker's understanding of key information in the advance directive documents     Patient/Healthcare Decision Maker Instructions:  Return a copy of Advance Directive Form(s) to medical office    [facility] on 10/30/2021 at 2:55 PM

## 2021-10-30 NOTE — ED NOTES
Pt stuck over 7 times for lab work by multiple RNs. Unsuccessful. Dr. Aurora Dsouza notified at [phone], Tylor Hagen for ART stick.

## 2021-10-30 NOTE — CONSULTS
Consult dictated. 60-year-old male with multiple vascular risk factors admitted for acute and subacute right PCA infarctions with occluded right PCA, likely due to atherothrombosis. Continue aspirin and statin. Apparently patient had not been taking any medications at home. Initiate discharge planning.     Alinda Peabody, MD

## 2021-10-30 NOTE — PROGRESS NOTES
Patient stuck 4 times by 2 RNs for [place]. ..unsuccessful. PerfectServed on-call hospitalist for an ART stick. ..waiting for order

## 2021-10-31 LAB
ATRIAL RATE: 57 BPM
CALCULATED P AXIS, ECG09: 63 DEGREES
CALCULATED R AXIS, ECG10: -67 DEGREES
CALCULATED T AXIS, ECG11: -56 DEGREES
DIAGNOSIS, 93000: NORMAL
GLUCOSE BLD STRIP.AUTO-MCNC: 106 MG/DL (ref 65–117)
GLUCOSE BLD STRIP.AUTO-MCNC: 133 MG/DL (ref 65–117)
GLUCOSE BLD STRIP.AUTO-MCNC: 92 MG/DL (ref 65–117)
P-R INTERVAL, ECG05: 144 MS
Q-T INTERVAL, ECG07: 466 MS
QRS DURATION, ECG06: 128 MS
QTC CALCULATION (BEZET), ECG08: 453 MS
SERVICE CMNT-IMP: ABNORMAL
SERVICE CMNT-IMP: NORMAL
SERVICE CMNT-IMP: NORMAL
VENTRICULAR RATE, ECG03: 57 BPM

## 2021-10-31 PROCEDURE — 74011250637 HC RX REV CODE- 250/637: Performed by: INTERNAL MEDICINE

## 2021-10-31 PROCEDURE — 82962 GLUCOSE BLOOD TEST: CPT

## 2021-10-31 PROCEDURE — 65660000000 HC RM CCU STEPDOWN

## 2021-10-31 RX ADMIN — OXYCODONE 5 MG: 5 TABLET ORAL at 23:01

## 2021-10-31 RX ADMIN — TAMSULOSIN HYDROCHLORIDE 0.4 MG: 0.4 CAPSULE ORAL at 11:07

## 2021-10-31 RX ADMIN — CARVEDILOL 25 MG: 12.5 TABLET, FILM COATED ORAL at 17:14

## 2021-10-31 RX ADMIN — METHOCARBAMOL 750 MG: 750 TABLET ORAL at 17:08

## 2021-10-31 RX ADMIN — OXYCODONE 5 MG: 5 TABLET ORAL at 00:00

## 2021-10-31 RX ADMIN — APIXABAN 5 MG: 5 TABLET, FILM COATED ORAL at 23:02

## 2021-10-31 RX ADMIN — METHOCARBAMOL 750 MG: 750 TABLET ORAL at 23:00

## 2021-10-31 RX ADMIN — CARVEDILOL 25 MG: 12.5 TABLET, FILM COATED ORAL at 11:45

## 2021-10-31 RX ADMIN — APIXABAN 5 MG: 5 TABLET, FILM COATED ORAL at 11:06

## 2021-10-31 RX ADMIN — LISINOPRIL 10 MG: 10 TABLET ORAL at 11:07

## 2021-10-31 RX ADMIN — PANTOPRAZOLE SODIUM 40 MG: 40 TABLET, DELAYED RELEASE ORAL at 11:45

## 2021-10-31 RX ADMIN — SPIRONOLACTONE 25 MG: 25 TABLET ORAL at 11:07

## 2021-10-31 RX ADMIN — FUROSEMIDE 40 MG: 40 TABLET ORAL at 11:06

## 2021-10-31 RX ADMIN — ASPIRIN 81 MG CHEWABLE TABLET 81 MG: 81 TABLET CHEWABLE at 11:06

## 2021-10-31 RX ADMIN — METHOCARBAMOL 750 MG: 750 TABLET ORAL at 11:07

## 2021-10-31 RX ADMIN — AMITRIPTYLINE HYDROCHLORIDE 25 MG: 25 TABLET, FILM COATED ORAL at 23:02

## 2021-10-31 RX ADMIN — ATORVASTATIN CALCIUM 80 MG: 40 TABLET, FILM COATED ORAL at 11:07

## 2021-10-31 RX ADMIN — OXYCODONE 5 MG: 5 TABLET ORAL at 14:29

## 2021-10-31 NOTE — PROGRESS NOTES
Bedside shift change report given to [facility] ROSANNE LUCAS (oncoming nurse) by Monika Mitchell (offgoing nurse). Report included the following information SBAR, ED Summary, Intake/Output, Recent Results, Med Rec Status and Cardiac Rhythm NSR.

## 2021-10-31 NOTE — PROGRESS NOTES
Hospitalist Progress Note  Hailey Hebert MD  Answering service: [phone] from in house phone      Date of Service:  10/31/2021  NAME:  Rui Trujillo  :  1950  MRN:  [id number]    Admission Summary:   71M p/w suspected CVA    Interval history / Subjective:   Patient seen and examined at bedside, feels ok, on acute events. MRI and CTAa reviewed by neurology and neurointervention teams. Echo pending. Will start working on dc. Assessment & Plan:     #. Acute CVAs:   - CT head: foci of acute/subacute infarcts in R- occipital area. CTA head/neck: No sig stenosis  - , A1c 7.9. Statin 'high intensity', aspirin.   - MRI: R PCA territory likely subacute infarcts, occluded R PCA- likely atherothrombotic- evaluated by [facility] who didn't think it needs any intervention and likely old. - TTE with bubble pending.  - Neuro following, restarted home meds, pt not been compliant. #. CAD: s/p CABG. stable, home regimen, monitor  #. Diastolic CHF: stable, continue home meds/diuretics. #. HTN: chronic, stable, Home regimen, PRN BP meds. Monitor  #. HLD: chronic, Stable, Home regimen  #. DM2: A1c 7.9, SSI, AccuChecks, monitor  #. COPD: no exacerbation, continue home regimen, DuoNebs PRN. #. Chronic nicotine dependence: counseled on health risks of nicotine. Nicotine patch daily  #. smokes marijuana. UDS +ve. Pt counseled on harmful effects. Encouraged to quit. Monitor   #. PEs: chronic- continue Eliquis. Code status: Full  DVT prophylaxis: Eliquis  Care Plan discussed with: Patient/Family and Nurse  Disposition: TBD 1-2days     Hospital Problems  Date Reviewed: 10/29/2021        Codes Class Noted POA    * (Principal) Acute CVA (cerebrovascular accident) [facility]) ICD-10-CM: I63.9  ICD-9-CM: 434.91  10/29/2021 Yes            Review of Systems:   Pertinent items are mentioned in interval history.     Vital Signs:    Last 24hrs VS reviewed since prior progress note. Most recent are:  Visit Vitals  BP (!) 140/75   Pulse 69   Temp 98.2 °F (36.8 °C)   Resp 16   Ht 6' (1.829 m)   Wt 102.3 kg (225 lb 8.5 oz)   SpO2 97%   BMI 30.59 kg/m²       No intake or output data in the 24 hours ending 10/31/21 0945     Physical Examination:   Evaluated face to face and examined 10/31/21    General:  Alert, partially oriented, No acute distress. Elderly BM. Card:  S1, S2, No murmurs, good peripheral perfusion, warm peripheries  Resp:  No accessory muscle use, Good AE, no wheezes. no crepitations  Abd:  Soft, non-tender, non-distended, BS+  Extremities:  No cyanosis or clubbing, no significant edema  Neuro:  alert, partially oriented, follows commands, speech understood   Psych:  fair/poor insight, not agitated. Data Review:    Review and/or order of clinical lab test  Review and/or order of tests in the radiology section of CPT  Review and/or order of tests in the medicine section of CPT  Labs:     Recent Labs     10/30/21  0718 10/29/21  1523   WBC 7.1 7.2   HGB 14.5 16.5   HCT 42.8 49.8    180     Recent Labs     10/30/21  0718 10/29/21  1808    139   K 3.6 4.2   * 109*   CO2 26 26   BUN 7 10   CREA 0.68* 0.78   * 87   CA 9.1 9.4   MG 2.0 2.0   PHOS 3.1  --      Recent Labs     10/30/21  0718 10/29/21  1808   ALT 25 29   AP 80 89   TBILI 0.4 0.7   TP 6.6 7.5   ALB 2.8* 3.3*   GLOB 3.8 4.2*     No results for input(s): INR, PTP, APTT, INREXT, INREXT in the last 72 hours. No results for input(s): FE, TIBC, PSAT, FERR in the last 72 hours. Lab Results   Component Value Date/Time    Folate 12.8 10/30/2021 07:18 AM      No results for input(s): PH, PCO2, PO2 in the last 72 hours. No results for input(s): CPK, CKNDX, TROIQ in the last 72 hours.     No lab exists for component: CPKMB  Lab Results   Component Value Date/Time    Cholesterol, total 202 (H) 10/30/2021 07:18 AM    HDL Cholesterol 43 10/30/2021 07:18 AM    LDL, calculated 134.8 (H) 10/30/2021 07:18 AM    Triglyceride 121 10/30/2021 07:18 AM    CHOL/HDL Ratio 4.7 10/30/2021 07:18 AM     Lab Results   Component Value Date/Time    Glucose (POC) 166 (H) 10/30/2021 10:05 PM    Glucose (POC) 127 (H) 10/30/2021 04:49 PM    Glucose (POC) 132 (H) 10/30/2021 07:39 AM    Glucose (POC) 134 (H) 10/30/2021 02:08 AM    Glucose (POC) 170 (H) 10/29/2021 11:16 PM     Lab Results   Component Value Date/Time    Color DARK YELLOW 10/29/2021 03:23 PM    Appearance CLEAR 10/29/2021 03:23 PM    Specific gravity 1.030 10/29/2021 03:23 PM    Specific gravity 1.015 01/02/2019 03:45 PM    pH (UA) 5.5 10/29/2021 03:23 PM    Protein Negative 10/29/2021 03:23 PM    Glucose Negative 10/29/2021 03:23 PM    Ketone TRACE (A) 10/29/2021 03:23 PM    Bilirubin Negative 10/29/2021 03:23 PM    Urobilinogen 1.0 10/29/2021 03:23 PM    Nitrites Negative 10/29/2021 03:23 PM    Leukocyte Esterase SMALL (A) 10/29/2021 03:23 PM    Epithelial cells FEW 10/29/2021 03:23 PM    Bacteria Negative 10/29/2021 03:23 PM    WBC 10-20 10/29/2021 03:23 PM    RBC 0-5 10/29/2021 03:23 PM     Medications Reviewed:     Current Facility-Administered Medications   Medication Dose Route Frequency    apixaban (ELIQUIS) tablet 5 mg  5 mg Oral Q12H    oxyCODONE IR (ROXICODONE) tablet 5 mg  5 mg Oral Q4H PRN    albuterol-ipratropium (DUO-NEB) 2.5 MG-0.5 MG/3 ML  3 mL Nebulization Q6H PRN    amitriptyline (ELAVIL) tablet 25 mg  25 mg Oral QHS    aspirin chewable tablet 81 mg  81 mg Oral DAILY    atorvastatin (LIPITOR) tablet 80 mg  80 mg Oral DAILY    carvediloL (COREG) tablet 25 mg  25 mg Oral BID WITH MEALS    furosemide (LASIX) tablet 40 mg  40 mg Oral DAILY    lisinopriL (PRINIVIL, ZESTRIL) tablet 10 mg  10 mg Oral DAILY    nicotine (NICODERM CQ) 21 mg/24 hr patch 1 Patch  1 Patch TransDERmal Q24H    pantoprazole (PROTONIX) tablet 40 mg  40 mg Oral ACB    spironolactone (ALDACTONE) tablet 25 mg  25 mg Oral DAILY    tamsulosin (FLOMAX) capsule 0.4 mg  0.4 mg Oral DAILY    methocarbamoL (ROBAXIN) tablet 750 mg  750 mg Oral TID    acetaminophen (TYLENOL) tablet 650 mg  650 mg Oral Q4H PRN    Or    acetaminophen (TYLENOL) solution 650 mg  650 mg Per NG tube Q4H PRN    Or    acetaminophen (TYLENOL) suppository 650 mg  650 mg Rectal Q4H PRN    ondansetron (ZOFRAN) injection 4 mg  4 mg IntraVENous Q6H PRN    bisacodyL (DULCOLAX) tablet 5 mg  5 mg Oral DAILY PRN    insulin lispro (HUMALOG) injection   SubCUTAneous AC&HS    glucose chewable tablet 16 g  4 Tablet Oral PRN    dextrose (D50W) injection syrg 12.5-25 g  12.5-25 g IntraVENous PRN    glucagon (GLUCAGEN) injection 1 mg  1 mg IntraMUSCular PRN   ______________________________________________________________________  EXPECTED LENGTH OF STAY: - - -  ACTUAL LENGTH OF STAY:          2               Jim Severance, MD

## 2021-10-31 NOTE — PROGRESS NOTES
Bedside shift change report given to Master Aiken RN (oncoming nurse) by Italo Higuera, Student RN (offgoing nurse). Report included the following information SBAR, Kardex, Intake/Output, MAR, Recent Results, Cardiac Rhythm NSR and Dual Neuro Assessment.

## 2021-11-01 LAB
GLUCOSE BLD STRIP.AUTO-MCNC: 119 MG/DL (ref 65–117)
GLUCOSE BLD STRIP.AUTO-MCNC: 145 MG/DL (ref 65–117)
GLUCOSE BLD STRIP.AUTO-MCNC: 183 MG/DL (ref 65–117)
GLUCOSE BLD STRIP.AUTO-MCNC: 94 MG/DL (ref 65–117)
SERVICE CMNT-IMP: ABNORMAL
SERVICE CMNT-IMP: NORMAL

## 2021-11-01 PROCEDURE — 65660000000 HC RM CCU STEPDOWN

## 2021-11-01 PROCEDURE — 97116 GAIT TRAINING THERAPY: CPT

## 2021-11-01 PROCEDURE — 99356 PR PROLONGED SVC I/P OR OBS SETTING 1ST HOUR: CPT | Performed by: CLINICAL NURSE SPECIALIST

## 2021-11-01 PROCEDURE — 97112 NEUROMUSCULAR REEDUCATION: CPT

## 2021-11-01 PROCEDURE — 74011250637 HC RX REV CODE- 250/637: Performed by: INTERNAL MEDICINE

## 2021-11-01 PROCEDURE — 99233 SBSQ HOSP IP/OBS HIGH 50: CPT | Performed by: CLINICAL NURSE SPECIALIST

## 2021-11-01 PROCEDURE — 97530 THERAPEUTIC ACTIVITIES: CPT

## 2021-11-01 PROCEDURE — 74011636637 HC RX REV CODE- 636/637: Performed by: INTERNAL MEDICINE

## 2021-11-01 PROCEDURE — 92610 EVALUATE SWALLOWING FUNCTION: CPT

## 2021-11-01 PROCEDURE — 82962 GLUCOSE BLOOD TEST: CPT

## 2021-11-01 PROCEDURE — 97535 SELF CARE MNGMENT TRAINING: CPT

## 2021-11-01 RX ADMIN — ASPIRIN 81 MG CHEWABLE TABLET 81 MG: 81 TABLET CHEWABLE at 08:20

## 2021-11-01 RX ADMIN — APIXABAN 5 MG: 5 TABLET, FILM COATED ORAL at 21:50

## 2021-11-01 RX ADMIN — LISINOPRIL 10 MG: 10 TABLET ORAL at 08:20

## 2021-11-01 RX ADMIN — METHOCARBAMOL 750 MG: 750 TABLET ORAL at 21:51

## 2021-11-01 RX ADMIN — METHOCARBAMOL 750 MG: 750 TABLET ORAL at 17:32

## 2021-11-01 RX ADMIN — APIXABAN 5 MG: 5 TABLET, FILM COATED ORAL at 08:20

## 2021-11-01 RX ADMIN — CARVEDILOL 25 MG: 12.5 TABLET, FILM COATED ORAL at 08:16

## 2021-11-01 RX ADMIN — AMITRIPTYLINE HYDROCHLORIDE 25 MG: 25 TABLET, FILM COATED ORAL at 21:50

## 2021-11-01 RX ADMIN — FUROSEMIDE 40 MG: 40 TABLET ORAL at 08:20

## 2021-11-01 RX ADMIN — TAMSULOSIN HYDROCHLORIDE 0.4 MG: 0.4 CAPSULE ORAL at 08:20

## 2021-11-01 RX ADMIN — ATORVASTATIN CALCIUM 80 MG: 40 TABLET, FILM COATED ORAL at 08:20

## 2021-11-01 RX ADMIN — INSULIN LISPRO 2 UNITS: 100 INJECTION, SOLUTION INTRAVENOUS; SUBCUTANEOUS at 13:12

## 2021-11-01 RX ADMIN — METHOCARBAMOL 750 MG: 750 TABLET ORAL at 08:20

## 2021-11-01 RX ADMIN — CARVEDILOL 25 MG: 12.5 TABLET, FILM COATED ORAL at 17:32

## 2021-11-01 RX ADMIN — SPIRONOLACTONE 25 MG: 25 TABLET ORAL at 08:20

## 2021-11-01 RX ADMIN — PANTOPRAZOLE SODIUM 40 MG: 40 TABLET, DELAYED RELEASE ORAL at 07:34

## 2021-11-01 NOTE — PROGRESS NOTES
Problem: Diabetes Self-Management  Goal: *Disease process and treatment process  Description: Define diabetes and identify own type of diabetes; list 3 options for treating diabetes. Outcome: Progressing Towards Goal  Goal: *Incorporating nutritional management into lifestyle  Description: Describe effect of type, amount and timing of food on blood glucose; list 3 methods for planning meals. Outcome: Progressing Towards Goal  Goal: *Incorporating physical activity into lifestyle  Description: State effect of exercise on blood glucose levels. Outcome: Progressing Towards Goal  Goal: *Developing strategies to promote health/change behavior  Description: Define the ABC's of diabetes; identify appropriate screenings, schedule and personal plan for screenings. Outcome: Progressing Towards Goal  Goal: *Using medications safely  Description: State effect of diabetes medications on diabetes; name diabetes medication taking, action and side effects. Outcome: Progressing Towards Goal  Goal: *Monitoring blood glucose, interpreting and using results  Description: Identify recommended blood glucose targets  and personal targets. Outcome: Progressing Towards Goal  Goal: *Prevention, detection, treatment of acute complications  Description: List symptoms of hyper- and hypoglycemia; describe how to treat low blood sugar and actions for lowering  high blood glucose level. Outcome: Progressing Towards Goal  Goal: *Prevention, detection and treatment of chronic complications  Description: Define the natural course of diabetes and describe the relationship of blood glucose levels to long term complications of diabetes.   Outcome: Progressing Towards Goal  Goal: *Developing strategies to address psychosocial issues  Description: Describe feelings about living with diabetes; identify support needed and support network  Outcome: Progressing Towards Goal  Goal: *Insulin pump training  Outcome: Progressing Towards Goal  Goal: *Sick day guidelines  Outcome: Progressing Towards Goal  Goal: *Patient Specific Goal (EDIT GOAL, INSERT TEXT)  Outcome: Progressing Towards Goal     Problem: Patient Education: Go to Patient Education Activity  Goal: Patient/Family Education  Outcome: Progressing Towards Goal     Problem: Patient Education: Go to Patient Education Activity  Goal: Patient/Family Education  Outcome: Progressing Towards Goal     Problem: Patient Education: Go to Patient Education Activity  Goal: Patient/Family Education  Outcome: Progressing Towards Goal     Problem: Falls - Risk of  Goal: *Absence of Falls  Description: Document Clydene Bone Fall Risk and appropriate interventions in the flowsheet.   Outcome: Progressing Towards Goal  Note: Fall Risk Interventions:  Mobility Interventions: Bed/chair exit alarm, Patient to call before getting OOB, PT Consult for mobility concerns, Strengthening exercises (ROM-active/passive), Utilize walker, cane, or other assistive device         Medication Interventions: Bed/chair exit alarm, Patient to call before getting OOB, Teach patient to arise slowly    Elimination Interventions: Bed/chair exit alarm, Urinal in reach, Toileting schedule/hourly rounds              Problem: Patient Education: Go to Patient Education Activity  Goal: Patient/Family Education  Outcome: Progressing Towards Goal

## 2021-11-01 NOTE — PROGRESS NOTES
Bedside shift change report given to Francesco Morrison RN (oncoming nurse) by Reid Murdock RN (offgoing nurse). Report included the following information SBAR, Kardex, ED Summary, MAR, Recent Results, Cardiac Rhythm Sinus Charles Docker, Alarm Parameters , Quality Measures and Dual Neuro Assessment.

## 2021-11-01 NOTE — PROGRESS NOTES
Transition of Care Plan: Home with outpatient therapy    RUR: 10%    PCP F/U: Lambert Dumont NP    Disposition: home with outpatient therapy    Transportation: [place]    Main Contact: Counselor-Alphonso [phone]: Patient to discharge to a group home/rooming house once medically stable. Will need outpatient PT/OT script at discharge. 1159: Confirmed with patient that he is being discharged back to the group home: [address]. States that his counselor Yadi Henley will pick him up.      Nai Ralph RN, CRM

## 2021-11-01 NOTE — PROGRESS NOTES
Bedside and Verbal shift change report given to Dalila [place] Cyndi (oncoming nurse) by Rama Walters RN (offgoing nurse). Report included the following information SBAR, Kardex, MAR, Cardiac Rhythm NSR-Esteban and Dual Neuro Assessment.

## 2021-11-01 NOTE — PROGRESS NOTES
Problem: Self Care Deficits Care Plan (Adult)  Goal: *Acute Goals and Plan of Care (Insert Text)  Description: FUNCTIONAL STATUS PRIOR TO ADMISSION: I PTA, no drivers license; has  who drives him to appts, incarcerated x 25 yrs until recently per chart    HOME SUPPORT: lives w/3 others in small home; walk in shower; has \"\" whom he called when he needed assist to hospital 10-2 this year. Occupational Therapy Goals  Initiated 10/30/2021   1. Patient will perform scanning environment with during meals with modified independence within 7 day(s). 2.  Patient will perform simple home management with safe environmental scanning with modified independence within 7 day(s). 3  Patient will participate in upper extremity therapeutic coordination exercise/activities with supervision/set-up within 7 day(s). 4.  Patient will utilize energy conservation, fall prevention techniques during functional activities with verbal cues within 7 day(s). 5.  Patient will improve their Fugl Marlow score by 5 points in prep for ADLs within 7 days. 6.  Patient will verbalize understanding of BE FAST and personal risk factors for CVA in 7 days       Outcome: Progressing Towards Goal   OCCUPATIONAL THERAPY TREATMENT  Patient: Brent Noonan (28 y.o. male)  Date: 11/1/2021  Diagnosis: Acute CVA (cerebrovascular accident) [facility]) [I63.9] Acute CVA (cerebrovascular accident) [facility])       Precautions: Fall, Spinal (precautions for back pain; Open heart surgery 2017)  Chart, occupational therapy assessment, plan of care, and goals were reviewed. ASSESSMENT  Patient continues with skilled OT services and is progressing towards goals. Patient semi supine in bed upon OT entry and agreeable to working with therapy. Patient with socks on bed, one on left side of body and one on right, difficulty locating sock in L visual field requiring cueing to scanning. Patient with improved scanning throughout session after initial cueing. Patient stood at sink ~6 minutes to complete grooming tasks and stood at toilet to urinate. Patient returned to recliner in room and participated in visual scanning tasks in room, demonstrating good attention to left side after initial verbal cueing to scan to left. Patient educated on BEFAST, unable to recall acronym or signs and symptoms of stroke independently at this time, provided with print out of BEFAST and re-educated, patient with good participation with learning, will follow up next session as able for carryover. Patient overall with good participation this day and no noted LOB during mobility, will require further reinforcement of education as able. Recommend discharge back to prior living situation with OP OT neuro and vision, when medically appropriate. Current Level of Function Impacting Discharge (ADLs): requires supervision or SBA with verbal cueing for self care tasks    Other factors to consider for discharge: home environment, prior level of function, compensatory strategies         PLAN :  Patient continues to benefit from skilled intervention to address the above impairments. Continue treatment per established plan of care to address goals. Recommend with staff: Recommend with nursing, ADLs with supervision/setup, OOB to chair 3x/day via gait belt and toileting via functional mobility to and from bathroom. Thank you for completing as able in order to maintain patient strength, endurance and independence. Recommendation for discharge: (in order for the patient to meet his/her long term goals)  Outpatient occupational therapy follow up recommended for neuro and vision    This discharge recommendation:  Has been made in collaboration with the attending provider and/or case management    IF patient discharges home will need the following DME: patient owns DME required for discharge       SUBJECTIVE:   Patient stated I think it was aspirin.  When asked what the acronym is for signs and symptoms of stroke. OBJECTIVE DATA SUMMARY:   Cognitive/Behavioral Status:  Neurologic State: Alert  Orientation Level: Oriented X4  Cognition: Follows commands             Functional Mobility and Transfers for ADLs:  Bed Mobility:  Rolling: Supervision  Supine to Sit: Supervision  Scooting: Supervision    Transfers:  Sit to Stand: Supervision  Functional Transfers  Bathroom Mobility: Supervision/set up  Bed to Chair: Supervision    Balance:  Sitting: Intact  Standing: Impaired; Without support  Standing - Static: Good; Unsupported  Standing - Dynamic : Fair;Occasional    ADL Intervention:  Feeding  Feeding Assistance: Stand-by assistance (verbal cueing for visual scanning)  Container Management: Independent  Food to Mouth: Independent  Drink to Mouth: Independent    Grooming  Grooming Assistance: Supervision  Position Performed: Standing (at sink)  Washing Face: Supervision  Washing Hands: Supervision  Brushing Teeth: Supervision  Cues: Verbal cues provided;Visual cues provided                   Lower Body Dressing Assistance  Dressing Assistance: Stand-by assistance (verbal cues to scan environment to locate sock on L side)  Socks: Stand-by assistance  Leg Crossed Method Used: Yes  Position Performed: Seated edge of bed  Cues: Verbal cues provided;Visual cues provided    Toileting  Toileting Assistance: Supervision  Bladder Hygiene: Supervision  Clothing Management: Supervision         Neuro Re-Education:  Standing bilateral coordination tasks during grooming at sink  Visual scanning tasks in sitting and standing to attend to L visual field    Pain:  8/10 back pain at rest and remains constant with activity    Activity Tolerance:   Good and SpO2 stable on RA    After treatment patient left in no apparent distress:   Sitting in chair, Call bell within reach and Bed / chair alarm activated    COMMUNICATION/COLLABORATION:   The patients plan of care was discussed with: Speech therapist and Registered nurse. Patient was educated regarding His deficit(s) of L visual field deficits as this relates to His diagnosis of CVA. He demonstrated Fair understanding as evidenced by good visual scanning during tasks after initial verbal cueing. Patient and/or family was verbally educated on the BE FAST acronym for signs/symptoms of CVA and TIA. BE FAST handout was provided and reviewed with patient. All questions answered with patient indicating good understanding.      Betty Joiner  Time Calculation: 23 mins

## 2021-11-01 NOTE — DIABETES MGMT
[facility]    CLINICAL NURSE SPECIALIST CONSULT     INITIAL NOTE    Initial Presentation   Angélica Elizalde is a 80 y.o. male admitted 10/29/21 with c/o generalized weakness and SOB. CT of head showed acute CVA. HX:   Past Medical History:   Diagnosis Date    CAD (coronary artery disease)     Diabetes ([facility] [address]     GERD (gastroesophageal reflux disease)     Heart failure (HCC)     Hypertension         INITIAL DX: Acute CVA (cerebrovascular accident) ([facility] [address] [I63.9]    Current Treatment     TX: MRI-1. Late acute versus subacute infarcts along the right PCA territory involve the  basal ganglia, thalamus, corpus callosum, and occipital lobe. Evidence of  associated petechial hemorrhage. 2. Extensive chronic microvascular ischemic disease  / ECHO-pending/ neurology consult/ B12-WNL/ TSH- WNL/    Consulted by Nursing for advanced diabetes nursing assessment and care for:   [x] Home management assessment      Hospital Course   Clinical progress has been complicated by acute CVA in setting of CAD/ and  Controlled DM2. .      Diabetes History   DM2- A1C in goal- 6.5%. PCP-Cha Calhoun-last communication in Sept 2020 when Lantus dose changed to ONCE daily. She attempted to call him and notify him of this but was unable to reach him. Notes say he needed to make a follow up appt.      Diabetes-related Medical History  Acute complications  NONE  Neurological complications  Peripheral neuropathy  Microvascular disease  NONE  Macrovascular disease  CAD and Cerebral vascular accident  Other associated conditions     HTN/ HF    Diabetes Medication History  Drug class Currently in use Discontinued Never used   Biguanide  Metformin-I stopped b/c I had diarrhea    DDP-4 inhibitor       Sulfonylurea      Thiazolidinedione      GLP-1 RA      SGLT-2 inhibitors      Basal insulin Lantus 30units daily   (states he gives himself 30 units BID, instead of once daily)     Bolus insulin Regular insulin 10units twice daily     Fixed Dose  Combinations        Subjective   I can't say no to sweets\"    Has reported frequent low blood sugars lately, likely due to too much insulin   Has been giving him self 30 units Lantus in AM and PM (when prescribed as once daily)    Patient lives with roommates in a house, no family or children      Patient reports the following home diabetes self-management practices:   Eating pattern-poor dentition (3 teeth left)   [x] Eating a carbohydrate-controlled mealplan  [x] Breakfast Corn beef hash/ boiled egg/ coffee   Lunch  Fried fish sandwich/FF or chips/ regular ginger ale  [x] Dinner  \"Whatever my roommates cook\"  [x] Snacks Loves soft sweets: honey bun/ cakes/ ice cream  [x] Beverages Water/ sodas  Physical activity pattern - mostly sedentary     Monitoring pattern- checks BG twice daily AFTER meals -mostly high >150. Taking medications pattern  [x] Consistent administration  [x] Affordable    Social determinants of health impacting diabetes self-management practices   Concerned that you need to know more about how to stay healthy with diabetes    Overall evaluation:    [x] Achieving A1c target with drug therapy & self-care practices     Objective   Physical exam  General Over weight male in no acute distress. Conversant and cooperative  Neuro  Alert, oriented   Vital Signs   Visit Vitals  /70 (BP 1 Location: Right upper arm, BP Patient Position: Lying)   Pulse (!) 54   Temp 97.5 °F (36.4 °C)   Resp 16   Ht 6' (1.829 m)   Wt 102.3 kg (225 lb 8.5 oz)   SpO2 99%   BMI 30.59 kg/m²     Skin  Warm and dry. Heart   Regular rate and rhythm.  No murmurs, rubs or gallops  Lungs  Clear to auscultation without rales or rhonchi  Extremities No foot wounds    Diabetic foot exam:    Left Foot     Visual Exam: normal    Pulse DP: 2+ (normal)   Filament test: reduced sensation -bottom of toes     Right Foot   Visual Exam: normal    Pulse DP: 2+ (normal)   Filament test: reduced sensation -bottom of toes    DP & PT pulses +2. Laboratory  Tests 10/30   A1c 6.5%      Anion gap -   Serum triglycerides 121   WBC -   Serum creatinine 0.68   GFR >60   AST WNL   ALT WNL   cholesterol 202      B-12 361   TSH WNL     Factors impacting BG management  Factor Dose Comments   Nutrition:  Standard meals     60 grams/meal      Other:   Kidney function  Liver function     WNL  WNL      Blood glucose pattern        Assessment and Plan   Nursing Diagnosis Risk for unstable blood glucose pattern   Nursing Intervention Domain 5251 Decision-making Support   Nursing Interventions Examined current inpatient diabetes control   Explored factors facilitating and impeding inpatient management  Identified self-management practices impeding diabetes control  Explored corrective strategies with patient and responsible inpatient provider   Informed patient of rational for insulin strategy while hospitalized  Instructed patient in :  Check BG before meals   Taking Lantus once a day     Evaluation   This AA male , with controlled  Type 2 diabetes,and in target A1C 6.5%. Currently admitted for acute CVA. Re: diabetes management, he reports taking his Lantus twice daily (with breakfast and dinner) and the regular insulin 10 units twice daily. His Lantus dose was changed to once daily back in Sept. But MD was unable to reach him by phone to communicate this to him (per chart review). He also reports frequent low blood sugars states \"the lows are terrible\". He monitors his blood glucose once to twice daily but checks AFTER meals. Discussed his diet - consumes sweets, regular sodas, frequently. He reports he sometimes \"forgets\" if he gave himself his insulin and will dose himself again. Discussed how dangerous it is for him to do this. On admission he also had low BG. Likely this is due to non intentional over dosing on his insulins at home.    BG trends since hospitalized have been in target without edwina.  Has only needed 2 units of correctional insulin since admitted on 10/29/21. His diet at home is driving his elevated blood sugars and increased insulin needs. He seems easily confused with his regimen - Will attempt to simplify his and write down what he needs to do. Recommendations     [x] Use of Subcutaneous Insulin Order set (7785)  Insulin Dosing Specific recommendation   CONTINUE Corrective                                       (Useful in adjusting insulin dosing) [] Normal sensitivity          Discharge Plannnig   1. Resume Lantus 30 units ONCE daily  2. Resume regular insulin 10units Twice daily  3. Follow up with PCP post hospital discharge- will route this note. Billing Code(s)   76457  [id number]    Before making these care recommendations, I personally reviewed the hospitalization record, including notes, laboratory & diagnostic data and current medications, and examined the patient at the bedside (circumstances permitting) before making care recommendations.      Total minutes: [address]  Diabetes Clinical Nurse Specialist  [facility]  Access via [facility]

## 2021-11-01 NOTE — PROGRESS NOTES
SPEECH PATHOLOGY BEDSIDE SWALLOW EVALUATION/DISCHARGE  Patient: Bishop Land [de-identified]80 y.o. male)  Date: 11/1/2021  Primary Diagnosis: Acute CVA (cerebrovascular accident) [facility]) [I63.9]       Precautions:   Fall, Spinal (precautions for back pain; Open heart surgery 2017)    ASSESSMENT :  Based on the objective data described below, the patient presents with [facility] oral/pharyngeal swallow. Patient with mildly prolonged mastication likely related to limited dentition, as patient reported he cannot eat hard foods. No overt s/s aspiration observed. Patient denied decline in speech function. Patient reported intermittent word-finding difficulty and memory loss, however upon further questioning, this began prior to CVA, and patient with no deficits in informal conversation. Note MRI showed Late acute versus subacute infarcts along the right PCA territory involve the basal ganglia, thalamus, corpus callosum, and occipital lobe and Evidence of associated petechial hemorrhage. Skilled acute therapy provided by a speech-language pathologist is not indicated at this time. PLAN :  Recommendations:  -Continue easy to chew/thin liquid diet  Discharge Recommendations: None     SUBJECTIVE:   Patient stated I love boogie but I can't eat it. If it gets a certain way, it goes up in my gums.  Ox4.     OBJECTIVE:     Past Medical History:   Diagnosis Date    CAD (coronary artery disease)     Diabetes ([facility] [address]     GERD (gastroesophageal reflux disease)     Heart failure ([facility] [address]     Hypertension      Past Surgical History:   Procedure Laterality Date    HX CORONARY ARTERY BYPASS GRAFT      4 way     Prior Level of Function/Home Situation:   Home Situation  Home Environment: Private residence  # Steps to Enter: 3  Rails to Enter: Yes  Wheelchair Ramp: No  One/Two Story Residence: Two story  Living Alone: No  Support Systems: Other (Comment) ((Counselor) Christopher folds- [phone])  Patient Expects to be Discharged to[de-identified] [place]  Current DME Used/Available at Home: Maebelle Cramp, rolling, Cane, straight (fearful he can't use in home due to small spaces)  Tub or Shower Type: Shower  Diet prior to admission: soft solids/thin liquid  Current Diet:  Easy to chew/thin liquid   Cognitive and Communication Status:  Neurologic State: Alert, Appropriate for age  Orientation Level: Oriented X4  Cognition: Follows commands  Perception: Appears intact  Perseveration: No perseveration noted  Safety/Judgement: Decreased insight into deficits, Decreased awareness of need for safety (receptive to learning about visual deficits)  Oral Assessment:  Oral Assessment  Labial: No impairment  Dentition: Natural;Limited  Oral Hygiene: moist oral mucosa  Lingual: No impairment  Velum: No impairment  Mandible: No impairment  P.O. Trials:  Patient Position: sitting in chair  Vocal quality prior to P.O.: No impairment  Consistency Presented: Thin liquid; Solid; Other (comment) (breakfast tray)  How Presented: Self-fed/presented;Cup/gulp; Successive swallows;Spoon     Bolus Acceptance: No impairment  Bolus Formation/Control: No impairment (WFL given limited dentition)     Propulsion: No impairment  Oral Residue: None  Initiation of Swallow: No impairment  Laryngeal Elevation: Functional  Aspiration Signs/Symptoms: None  Pharyngeal Phase Characteristics: No impairment, issues, or problems   Effective Modifications: None  Cues for Modifications: None       Oral Phase Severity: Other (comment) (WFL given dentition)  Pharyngeal Phase Severity : No impairment  NOMS:   The NOMS functional outcome measure was used to quantify this patient's level of swallowing impairment.   Based on the NOMS, the patient was determined to be at level 6 for swallow function     NOMS Swallowing Levels:  Level 1 (CN): NPO  Level 2 (CM): NPO but takes consistency in therapy  Level 3 (CL): Takes less than 50% of nutrition p.o. and continues with nonoral feedings; and/or safe with mod cues; and/or max diet restriction  Level 4 (CK): Safe swallow but needs mod cues; and/or mod diet restriction; and/or still requires some nonoral feeding/supplements  Level 5 (CJ): Safe swallow with min diet restriction; and/or needs min cues  Level 6 (CI): Independent with p.o.; rare cues; usually self cues; may need to avoid some foods or needs extra time  Level 7 ([address] [place] [address]): Independent for all p.o.  NEREIDA. (2003). National Outcomes Measurement System (NOMS): Adult Speech-Language Pathology User's Guide. Pain:           After treatment:   Patient left in no apparent distress sitting up in chair, Call bell within reach and Nursing notified    COMMUNICATION/EDUCATION:   Patient was educated regarding his deficit(s) of WFL swallow as this relates to his diagnosis of CVA. He demonstrated Good understanding as evidenced by verbalizing understanding. The patient's plan of care including recommendations, planned interventions, and recommended diet changes were discussed with: Registered nurse and OT.      Thank you for this referral.  Kenyetta Wharton, SLP  Time Calculation: 10 mins

## 2021-11-01 NOTE — PROGRESS NOTES
Hospitalist Progress Note  Ming Robins MD  Answering service: [phone] from in house phone      Date of Service:  2021  NAME:  Guillermo Smith  :  1950  MRN:  [id number]    Admission Summary:   71M p/w suspected CVA    Interval history / Subjective:   Patient seen and examined at bedside, feels some numbness on L side, Echo still pending. Assessment & Plan:     #. Acute CVAs:   - CT head: foci of acute/subacute infarcts in R- occipital area. CTA head/neck: No sig stenosis  - , A1c 7.9. Statin 'high intensity', aspirin.   - MRI: R PCA territory likely subacute infarcts, occluded R PCA- likely atherothrombotic- evaluated by [facility] who didn't think it needs any intervention and likely old. - TTE with bubble pending.  - Neuro following, needs evaluation by ophthalmology, they can't bring equipment into hospital, hence requested he follows up op closely with them. - restarted home meds, pt not been compliant. PT/OT- op therapy. #. CAD: s/p CABG. stable, home regimen, monitor  #. Diastolic CHF: stable, continue home meds/diuretics. #. HTN: chronic, stable, Home regimen, PRN BP meds. Monitor  #. HLD: chronic, Stable, Home regimen  #. DM2: A1c 7.9, SSI, AccuChecks, monitor  #. COPD: no exacerbation, continue home regimen, DuoNebs PRN. #. Chronic nicotine dependence: counseled on health risks of nicotine. Nicotine patch daily  #. smokes marijuana. UDS +ve. Pt counseled on harmful effects. Encouraged to quit. Monitor   #. PEs: chronic- continue Eliquis.     Code status: Full  DVT prophylaxis: Eliquis  Care Plan discussed with: Patient/Family and Nurse  Disposition: home [facility] Problems  Date Reviewed: 10/29/2021        Codes Class Noted POA    * (Principal) Acute CVA (cerebrovascular accident) [facility]) ICD-10-CM: I63.9  ICD-9-CM: 434.91  10/29/2021 Yes            Review of Systems:   Pertinent items are mentioned in interval history. Vital Signs:    Last 24hrs VS reviewed since prior progress note. Most recent are:  Visit Vitals  BP (!) 147/86 (BP 1 Location: Right arm, BP Patient Position: At rest)   Pulse (!) 53   Temp 98 °F (36.7 °C)   Resp 14   Ht 6' (1.829 m)   Wt 102.3 kg (225 lb 8.5 oz)   SpO2 98%   BMI 30.59 kg/m²       No intake or output data in the 24 hours ending 11/01/21 1144     Physical Examination:   Evaluated face to face and examined 11/01/21    General:  Alert, partially oriented, No acute distress. Elderly BM. Card:  S1, S2, No murmurs, good peripheral perfusion, warm peripheries  Resp:  No accessory muscle use, Good AE, no wheezes. no crepitations  Abd:  Soft, non-tender, non-distended, BS+  Extremities:  No cyanosis or clubbing, no significant edema  Neuro:  alert, partially oriented, follows commands, speech understood   Psych:  fair/poor insight, not agitated. Data Review:    Review and/or order of clinical lab test  Review and/or order of tests in the radiology section of CPT  Review and/or order of tests in the medicine section of CPT  Labs:     Recent Labs     10/30/21  0718 10/29/21  1523   WBC 7.1 7.2   HGB 14.5 16.5   HCT 42.8 49.8    180     Recent Labs     10/30/21  0718 10/29/21  1808    139   K 3.6 4.2   * 109*   CO2 26 26   BUN 7 10   CREA 0.68* 0.78   * 87   CA 9.1 9.4   MG 2.0 2.0   PHOS 3.1  --      Recent Labs     10/30/21  0718 10/29/21  1808   ALT 25 29   AP 80 89   TBILI 0.4 0.7   TP 6.6 7.5   ALB 2.8* 3.3*   GLOB 3.8 4.2*     No results for input(s): INR, PTP, APTT, INREXT, INREXT in the last 72 hours. No results for input(s): FE, TIBC, PSAT, FERR in the last 72 hours. Lab Results   Component Value Date/Time    Folate 12.8 10/30/2021 07:18 AM      No results for input(s): PH, PCO2, PO2 in the last 72 hours. No results for input(s): CPK, CKNDX, TROIQ in the last 72 hours.     No lab exists for component: CPKMB  Lab Results   Component Value Date/Time    Cholesterol, total 202 (H) 10/30/2021 07:18 AM    HDL Cholesterol 43 10/30/2021 07:18 AM    LDL, calculated 134.8 (H) 10/30/2021 07:18 AM    Triglyceride 121 10/30/2021 07:18 AM    CHOL/HDL Ratio 4.7 10/30/2021 07:18 AM     Lab Results   Component Value Date/Time    Glucose (POC) 94 11/01/2021 08:12 AM    Glucose (POC) 133 (H) 10/31/2021 10:39 PM    Glucose (POC) 106 10/31/2021 05:07 PM    Glucose (POC) 92 10/31/2021 11:43 AM    Glucose (POC) 166 (H) 10/30/2021 10:05 PM     Lab Results   Component Value Date/Time    Color DARK YELLOW 10/29/2021 03:23 PM    Appearance CLEAR 10/29/2021 03:23 PM    Specific gravity 1.030 10/29/2021 03:23 PM    Specific gravity 1.015 01/02/2019 03:45 PM    pH (UA) 5.5 10/29/2021 03:23 PM    Protein Negative 10/29/2021 03:23 PM    Glucose Negative 10/29/2021 03:23 PM    Ketone TRACE (A) 10/29/2021 03:23 PM    Bilirubin Negative 10/29/2021 03:23 PM    Urobilinogen 1.0 10/29/2021 03:23 PM    Nitrites Negative 10/29/2021 03:23 PM    Leukocyte Esterase SMALL (A) 10/29/2021 03:23 PM    Epithelial cells FEW 10/29/2021 03:23 PM    Bacteria Negative 10/29/2021 03:23 PM    WBC 10-20 10/29/2021 03:23 PM    RBC 0-5 10/29/2021 03:23 PM     Medications Reviewed:     Current Facility-Administered Medications   Medication Dose Route Frequency    apixaban (ELIQUIS) tablet 5 mg  5 mg Oral Q12H    oxyCODONE IR (ROXICODONE) tablet 5 mg  5 mg Oral Q4H PRN    albuterol-ipratropium (DUO-NEB) 2.5 MG-0.5 MG/3 ML  3 mL Nebulization Q6H PRN    amitriptyline (ELAVIL) tablet 25 mg  25 mg Oral QHS    aspirin chewable tablet 81 mg  81 mg Oral DAILY    atorvastatin (LIPITOR) tablet 80 mg  80 mg Oral DAILY    carvediloL (COREG) tablet 25 mg  25 mg Oral BID WITH MEALS    furosemide (LASIX) tablet 40 mg  40 mg Oral DAILY    lisinopriL (PRINIVIL, ZESTRIL) tablet 10 mg  10 mg Oral DAILY    nicotine (NICODERM CQ) 21 mg/24 hr patch 1 Patch  1 Patch TransDERmal Q24H    pantoprazole (PROTONIX) tablet 40 mg  40 mg Oral ACB    spironolactone (ALDACTONE) tablet 25 mg  25 mg Oral DAILY    tamsulosin (FLOMAX) capsule 0.4 mg  0.4 mg Oral DAILY    methocarbamoL (ROBAXIN) tablet 750 mg  750 mg Oral TID    acetaminophen (TYLENOL) tablet 650 mg  650 mg Oral Q4H PRN    Or    acetaminophen (TYLENOL) solution 650 mg  650 mg Per NG tube Q4H PRN    Or    acetaminophen (TYLENOL) suppository 650 mg  650 mg Rectal Q4H PRN    ondansetron (ZOFRAN) injection 4 mg  4 mg IntraVENous Q6H PRN    bisacodyL (DULCOLAX) tablet 5 mg  5 mg Oral DAILY PRN    insulin lispro (HUMALOG) injection   SubCUTAneous AC&HS    glucose chewable tablet 16 g  4 Tablet Oral PRN    dextrose (D50W) injection syrg 12.5-25 g  12.5-25 g IntraVENous PRN    glucagon (GLUCAGEN) injection 1 mg  1 mg IntraMUSCular PRN   ______________________________________________________________________  EXPECTED LENGTH OF STAY: - - -  ACTUAL LENGTH OF STAY:          3               Ming Robins MD

## 2021-11-01 NOTE — PROGRESS NOTES
Problem: Mobility Impaired (Adult and Pediatric)  Goal: *Acute Goals and Plan of Care (Insert Text)  Description: FUNCTIONAL STATUS PRIOR TO ADMISSION: Patient was independent and active without use of DME.    HOME SUPPORT PRIOR TO ADMISSION: The patient lived with three roommates. Questionable living situation? Physical Therapy Goals  Initiated 10/30/2021  1. Patient will move from supine to sit and sit to supine  in bed with modified independence within 7 day(s). 2.  Patient will transfer from bed to chair and chair to bed with modified independence using the least restrictive device within 7 day(s). 3.  Patient will perform sit to stand with modified independence within 7 day(s). 4.  Patient will ambulate with modified independence for 150 feet with the least restrictive device within 7 day(s). 5.  Patient will ascend/descend 12 stairs with 1 handrail(s) with modified independence within 7 day(s). Outcome: Progressing Towards Goal    PHYSICAL THERAPY TREATMENT  Patient: Derek Wyatt (37 y.o. male)  Date: 11/1/2021  Diagnosis: Acute CVA (cerebrovascular accident) [facility]) [I63.9] Acute CVA (cerebrovascular accident) [facility])       Precautions: Fall, Spinal (precautions for back pain; Open heart surgery 2017)  Chart, physical therapy assessment, plan of care and goals were reviewed. ASSESSMENT  Patient continues with skilled PT services and is progressing towards goals. Pt received supine in bed and willing to work with therapy. Pt continues to present with some L side inattention, decreased strength L>R with activity this session. Pt able to tolerate bed mobility to R side EOB with no assistance, followed by STS with decreased standing balance. Pt continued to bathroom with SBA/CGA with voiding during standing followed by washing hands. Pt continued with STS x5 from recliner with no UE support to increased  LE strength. Pt continued with gait training in hallway up to 300' with no AD.  Upon return to room, pt sat in recliner with no reports of pain. Pt completed session seated in chair with alarm activated, call bell within reach and all needs met at the time. RN notified of session. Current Level of Function Impacting Discharge (mobility/balance): SBA/CGA for all mobility , amb up to 300' with no AD    Other factors to consider for discharge: cognition impairments, BEFAST         PLAN :  Patient continues to benefit from skilled intervention to address the above impairments. Continue treatment per established plan of care. to address goals. Recommendation for discharge: (in order for the patient to meet his/her long term goals)  Physical therapy at least 2 days/week in the home / OP PT    This discharge recommendation:  Has not yet been discussed the attending provider and/or case management    IF patient discharges home will need the following DME: none       SUBJECTIVE:   Patient stated Aneesh Head a stroke or heart attack, I would rather a heart attack.     OBJECTIVE DATA SUMMARY:   Critical Behavior:  Neurologic State: Alert  Orientation Level: Oriented X4  Cognition: Follows commands  Safety/Judgement: Decreased insight into deficits, Decreased awareness of need for safety (receptive to learning about visual deficits)  Functional Mobility Training:  Bed Mobility:  Rolling: Supervision  Supine to Sit: Supervision  Scooting: Supervision     Transfers:  Sit to Stand: Stand-by assistance  Stand to Sit: Stand-by assistance    Balance:  Sitting: Intact  Standing: Impaired  Standing - Static: Constant support;Good;Fair  Standing - Dynamic : Constant support; Fair    Ambulation/Gait Training:  Distance (ft): 300 Feet (ft)  Assistive Device: Gait belt (No AD)  Ambulation - Level of Assistance: Stand-by assistance;Contact guard assistance  Base of Support: Widened  Speed/Kina: Slow  Step Length: Left shortened      Therapeutic Exercises:   STS  in recliner with no UE support x5    Pain Rating:  No pain reported    Activity Tolerance:   Good and Fair    After treatment patient left in no apparent distress:   Sitting in chair, Call bell within reach, and Bed / chair alarm activated    COMMUNICATION/COLLABORATION:   The patients plan of care was discussed with: Registered nurse.      Camille Lou PTA   Time Calculation: 31 mins

## 2021-11-02 ENCOUNTER — HOSPITAL ENCOUNTER (INPATIENT)
Dept: NON INVASIVE DIAGNOSTICS | Age: 71
DRG: 045 | End: 2021-11-02
Attending: INTERNAL MEDICINE
Payer: MEDICAID

## 2021-11-02 ENCOUNTER — APPOINTMENT (OUTPATIENT)
Dept: CT IMAGING | Age: 71
DRG: 045 | End: 2021-11-02
Attending: INTERNAL MEDICINE
Payer: MEDICAID

## 2021-11-02 LAB
GLUCOSE BLD STRIP.AUTO-MCNC: 152 MG/DL (ref 65–117)
GLUCOSE BLD STRIP.AUTO-MCNC: 162 MG/DL (ref 65–117)
GLUCOSE BLD STRIP.AUTO-MCNC: 177 MG/DL (ref 65–117)
GLUCOSE BLD STRIP.AUTO-MCNC: 213 MG/DL (ref 65–117)
SERVICE CMNT-IMP: ABNORMAL

## 2021-11-02 PROCEDURE — 74011250637 HC RX REV CODE- 250/637: Performed by: INTERNAL MEDICINE

## 2021-11-02 PROCEDURE — 74011000258 HC RX REV CODE- 258: Performed by: INTERNAL MEDICINE

## 2021-11-02 PROCEDURE — 82962 GLUCOSE BLOOD TEST: CPT

## 2021-11-02 PROCEDURE — 65660000000 HC RM CCU STEPDOWN

## 2021-11-02 PROCEDURE — 95816 EEG AWAKE AND DROWSY: CPT | Performed by: PSYCHIATRY & NEUROLOGY

## 2021-11-02 PROCEDURE — 05HC33Z INSERTION OF INFUSION DEVICE INTO LEFT BASILIC VEIN, PERCUTANEOUS APPROACH: ICD-10-PCS | Performed by: HOSPITALIST

## 2021-11-02 PROCEDURE — 95816 EEG AWAKE AND DROWSY: CPT | Performed by: INTERNAL MEDICINE

## 2021-11-02 PROCEDURE — C1751 CATH, INF, PER/CENT/MIDLINE: HCPCS

## 2021-11-02 PROCEDURE — 99231 SBSQ HOSP IP/OBS SF/LOW 25: CPT | Performed by: CLINICAL NURSE SPECIALIST

## 2021-11-02 PROCEDURE — 99233 SBSQ HOSP IP/OBS HIGH 50: CPT | Performed by: PSYCHIATRY & NEUROLOGY

## 2021-11-02 PROCEDURE — 74011636637 HC RX REV CODE- 636/637: Performed by: INTERNAL MEDICINE

## 2021-11-02 PROCEDURE — 77030020365 HC SOL INJ SOD CL 0.9% 50ML

## 2021-11-02 PROCEDURE — 70450 CT HEAD/BRAIN W/O DYE: CPT

## 2021-11-02 PROCEDURE — 76937 US GUIDE VASCULAR ACCESS: CPT

## 2021-11-02 PROCEDURE — 74011250636 HC RX REV CODE- 250/636: Performed by: INTERNAL MEDICINE

## 2021-11-02 RX ORDER — CARVEDILOL 3.12 MG/1
6.25 TABLET ORAL 2 TIMES DAILY WITH MEALS
Status: DISCONTINUED | OUTPATIENT
Start: 2021-11-03 | End: 2021-11-09 | Stop reason: HOSPADM

## 2021-11-02 RX ORDER — LORAZEPAM 2 MG/ML
1 INJECTION INTRAMUSCULAR
Status: DISCONTINUED | OUTPATIENT
Start: 2021-11-02 | End: 2021-11-09 | Stop reason: HOSPADM

## 2021-11-02 RX ORDER — LORAZEPAM 2 MG/ML
INJECTION INTRAMUSCULAR
Status: DISCONTINUED
Start: 2021-11-02 | End: 2021-11-02

## 2021-11-02 RX ADMIN — ASPIRIN 81 MG CHEWABLE TABLET 81 MG: 81 TABLET CHEWABLE at 08:28

## 2021-11-02 RX ADMIN — CARVEDILOL 25 MG: 12.5 TABLET, FILM COATED ORAL at 08:27

## 2021-11-02 RX ADMIN — SPIRONOLACTONE 25 MG: 25 TABLET ORAL at 08:27

## 2021-11-02 RX ADMIN — METHOCARBAMOL 750 MG: 750 TABLET ORAL at 18:11

## 2021-11-02 RX ADMIN — TAMSULOSIN HYDROCHLORIDE 0.4 MG: 0.4 CAPSULE ORAL at 08:27

## 2021-11-02 RX ADMIN — INSULIN LISPRO 2 UNITS: 100 INJECTION, SOLUTION INTRAVENOUS; SUBCUTANEOUS at 12:25

## 2021-11-02 RX ADMIN — APIXABAN 5 MG: 5 TABLET, FILM COATED ORAL at 21:55

## 2021-11-02 RX ADMIN — METHOCARBAMOL 750 MG: 750 TABLET ORAL at 21:55

## 2021-11-02 RX ADMIN — LEVETIRACETAM 500 MG: 100 INJECTION, SOLUTION, CONCENTRATE INTRAVENOUS at 15:14

## 2021-11-02 RX ADMIN — LORAZEPAM 1 MG: 2 INJECTION INTRAMUSCULAR; INTRAVENOUS at 11:03

## 2021-11-02 RX ADMIN — APIXABAN 5 MG: 5 TABLET, FILM COATED ORAL at 08:27

## 2021-11-02 RX ADMIN — FUROSEMIDE 40 MG: 40 TABLET ORAL at 08:27

## 2021-11-02 RX ADMIN — LISINOPRIL 10 MG: 10 TABLET ORAL at 08:27

## 2021-11-02 RX ADMIN — INSULIN LISPRO 2 UNITS: 100 INJECTION, SOLUTION INTRAVENOUS; SUBCUTANEOUS at 08:26

## 2021-11-02 RX ADMIN — ATORVASTATIN CALCIUM 80 MG: 40 TABLET, FILM COATED ORAL at 08:27

## 2021-11-02 RX ADMIN — PANTOPRAZOLE SODIUM 40 MG: 40 TABLET, DELAYED RELEASE ORAL at 08:28

## 2021-11-02 RX ADMIN — LEVETIRACETAM 500 MG: 100 INJECTION, SOLUTION, CONCENTRATE INTRAVENOUS at 21:00

## 2021-11-02 RX ADMIN — AMITRIPTYLINE HYDROCHLORIDE 25 MG: 25 TABLET, FILM COATED ORAL at 21:55

## 2021-11-02 RX ADMIN — INSULIN LISPRO 3 UNITS: 100 INJECTION, SOLUTION INTRAVENOUS; SUBCUTANEOUS at 18:11

## 2021-11-02 RX ADMIN — METHOCARBAMOL 750 MG: 750 TABLET ORAL at 08:27

## 2021-11-02 NOTE — PROGRESS NOTES
Bedside shift change report given to Rusty Anglin RN (oncoming nurse) by Justina Gonzales RN (offgoing nurse). Report included the following information SBAR, Kardex, Intake/Output, MAR, Cardiac Rhythm Sinus Maile Boss, Alarm Parameters , Quality Measures and Dual Neuro Assessment.

## 2021-11-02 NOTE — PROCEDURES
Midline Insertion and Progress Note    PRE-PROCEDURE VERIFICATION  Correct Procedure: yes  Correct Site:  yes  Temperature: Temp: 98.4 °F (36.9 °C), Temperature Source: Temp Source: Oral  No results for input(s): BUN, CREA, PLT, INR, WBC, PLTEXT, INREXT in the last 72 hours. No lab exists for component: APTHR  Allergies: Patient has no known allergies. PROCEDURE DETAIL  A single lumen midline IV catheter was started for vascular access. The following documentation is in addition to the Midline properties in the lines/airways flowsheet :  Xylocaine 1% used intradermally  Lot #: [id number]  Catheter Total Length: 13 (cm)  External Catheter Length: 0 (cm)  Circumference: 38 (cm)  Vein Selection for Midline :left basilic  Complication Related to Insertion: none    Line is okay to use.   Report given to Analy Cameron

## 2021-11-02 NOTE — PROGRESS NOTES
Occupational Therapy    Patient chart reviewed up to date. Note patient with questionable seizure this morning, orders for stat head CT. Will hold therapy at this time and follow-up as medically appropriate. Thank you.   Nadira Barragan, OTR/L

## 2021-11-02 NOTE — PROCEDURES
[address]   EEG    Name:  Edna Shaikh  MR#:  [id number]  :  1950  ACCOUNT #:  [de-identified]  DATE OF SERVICE:  2021      REQUESTING PHYSICIAN:  Dr. Jelly Collado.    HISTORY:  The patient is a 60-year-old male who is being evaluated for three episodes of unresponsiveness, gazing to one side and losing tone. DESCRIPTION:  This is an 18-channel EEG performed on an awake patient. The dominant posterior background rhythm consists of symmetric well-modulated medium voltage rhythms in the 8 Hz frequency range out of the posterior head region. There is minimal, brief 1-2 second periods of theta slowing seen in the right temporal head region when compared to the left. Drowsiness and sleep architecture were not noted. Photic stimulation did not elicit driving response. Hyperventilation was not performed. EEG SUMMARY:  Mildly abnormal EEG due to minimal intermittent slowing seen in the right frontal temporal region. CLINICAL INTERPRETATION:  This EEG shows minimal slowing in the right frontal temporal region when compared to the left. No convincing epileptiform features were noted. No seizure was recorded. Please correlate with imaging to rule out focal right-sided lesion.       Jolynn Yun MD      [id number]  D:  2021 14:05  T:  2021 15:57  JOB #:  [id number]

## 2021-11-02 NOTE — PROGRESS NOTES
Problem: Diabetes Self-Management  Goal: *Disease process and treatment process  Description: Define diabetes and identify own type of diabetes; list 3 options for treating diabetes. Outcome: Progressing Towards Goal  Goal: *Incorporating nutritional management into lifestyle  Description: Describe effect of type, amount and timing of food on blood glucose; list 3 methods for planning meals. Outcome: Progressing Towards Goal  Goal: *Incorporating physical activity into lifestyle  Description: State effect of exercise on blood glucose levels. Outcome: Progressing Towards Goal  Goal: *Using medications safely  Description: State effect of diabetes medications on diabetes; name diabetes medication taking, action and side effects. Outcome: Progressing Towards Goal  Goal: *Prevention, detection and treatment of chronic complications  Description: Define the natural course of diabetes and describe the relationship of blood glucose levels to long term complications of diabetes.   Outcome: Progressing Towards Goal

## 2021-11-02 NOTE — PROGRESS NOTES
Physical Therapy    Patient chart reviewed up to date. Note patient with questionable seizure this morning, orders for stat head CT. Will hold therapy at this time and follow-up as medically appropriate. Thank you.   Nupur Melvin, PT, DPT

## 2021-11-02 NOTE — PROGRESS NOTES
Bedside and Verbal shift change report given to [address] (oncoming nurse) by Diane Collazo (offgoing nurse). Report included the following information SBAR, Kardex, MAR, Cardiac Rhythm NSR-Sinus Penny Revel and Dual Neuro Assessment.

## 2021-11-02 NOTE — DIABETES MGMT
[facility]    CLINICAL NURSE SPECIALIST CONSULT     FOLLOW UP  NOTE    Initial Presentation   Lelia Kumar is a 80 y.o. male admitted 10/29/21 with c/o generalized weakness and SOB. CT of head showed acute CVA. HX:   Past Medical History:   Diagnosis Date    CAD (coronary artery disease)     Diabetes ([address]     GERD (gastroesophageal reflux disease)     Heart failure (HCC)     Hypertension         INITIAL DX: Acute CVA (cerebrovascular accident) ([address] [I63.9]    Current Treatment     TX: MRI-1. Late acute versus subacute infarcts along the right PCA territory involve the  basal ganglia, thalamus, corpus callosum, and occipital lobe. Evidence of  associated petechial hemorrhage. 2. Extensive chronic microvascular ischemic disease  / ECHO-pending/ neurology consult/ B12-WNL/ TSH- WNL/    Consulted by Nursing for advanced diabetes nursing assessment and care for:   [x] Home management assessment      Hospital Course   Clinical progress has been complicated by acute CVA in setting of CAD/ and  Controlled DM2. .      11/2/21: noted seizure activity today, 3 episodes of unresponsiveness/gazing/losing tone. STAT EEG completed. Repeat CT scan completed-evolving nonhemorrhagic right  occipital infarction  Diabetes History   DM2- A1C in goal- 6.5%. PCP-Cha Calhoun-last communication in Sept 2020 when Lantus dose changed to ONCE daily. She attempted to call him and notify him of this but was unable to reach him. Notes say he needed to make a follow up appt.      Diabetes-related Medical History  Acute complications  NONE  Neurological complications  Peripheral neuropathy  Microvascular disease  NONE  Macrovascular disease  CAD and Cerebral vascular accident  Other associated conditions     HTN/ HF    Diabetes Medication History  Drug class Currently in use Discontinued Never used   Biguanide  Metformin-I stopped b/c I had diarrhea    DDP-4 inhibitor       Sulfonylurea Thiazolidinedione      GLP-1 RA      SGLT-2 inhibitors      Basal insulin Lantus 30units daily   (states he gives himself 30 units BID, instead of once daily)     Bolus insulin Regular insulin 10units twice daily     Fixed Dose  Combinations        Subjective   Patient found resting in bed; somewhat groggy/lethargic. Given recent seizure activity this explains his presentation. Has reported frequent low blood sugars lately, likely due to too much insulin   Has been giving him self 30 units Lantus in AM and PM (when prescribed as once daily)    Patient lives with roommates in a house, no family or children      Patient reports the following home diabetes self-management practices:   Eating pattern-poor dentition (3 teeth left)   [x] Eating a carbohydrate-controlled mealplan  [x] Breakfast Corn beef hash/ boiled egg/ coffee   Lunch  Fried fish sandwich/FF or chips/ regular ginger ale  [x] Dinner  \"Whatever my roommates cook\"  [x] Snacks Loves soft sweets: honey bun/ cakes/ ice cream  [x] Beverages Water/ sodas  Physical activity pattern - mostly sedentary     Monitoring pattern- checks BG twice daily AFTER meals -mostly high >150. Taking medications pattern  [x] Consistent administration  [x] Affordable    Social determinants of health impacting diabetes self-management practices   Concerned that you need to know more about how to stay healthy with diabetes    Overall evaluation:    [x] Achieving A1c target with drug therapy & self-care practices     Objective   Physical exam  General Over weight male in no acute distress. Conversant and cooperative  Neuro  Alert, oriented   Vital Signs   Visit Vitals  /74   Pulse (!) 58   Temp 98.4 °F (36.9 °C)   Resp 18   Ht 6' (1.829 m)   Wt 102.3 kg (225 lb 8.5 oz)   SpO2 98%   BMI 30.59 kg/m²     Skin  Warm and dry. Heart   Regular rate and rhythm.  No murmurs, rubs or gallops  Lungs  Clear to auscultation without rales or rhonchi  Extremities No foot wounds    Diabetic foot exam:    Left Foot     Visual Exam: normal    Pulse DP: 2+ (normal)   Filament test: reduced sensation -bottom of toes     Right Foot   Visual Exam: normal    Pulse DP: 2+ (normal)   Filament test: reduced sensation -bottom of toes    DP & PT pulses +2. Laboratory  Tests 10/30   A1c 6.5%      Anion gap -   Serum triglycerides 121   WBC -   Serum creatinine 0.68   GFR >60   AST WNL   ALT WNL   cholesterol 202      B-12 361   TSH WNL     Factors impacting BG management  Factor Dose Comments   Nutrition:  Standard meals     60 grams/meal      Other:   Kidney function  Liver function     WNL  WNL      Blood glucose pattern        Assessment and Plan   Nursing Diagnosis Risk for unstable blood glucose pattern   Nursing Intervention Domain 5256 Decision-making Support   Nursing Interventions Examined current inpatient diabetes control   Explored factors facilitating and impeding inpatient management  Identified self-management practices impeding diabetes control  Explored corrective strategies with patient and responsible inpatient provider   Informed patient of rational for insulin strategy while hospitalized  Instructed patient in :  Check BG before meals   Taking Lantus once a day     Evaluation   This AA male , with controlled  Type 2 diabetes,and in target A1C 6.5%. Currently admitted for acute CVA. Re: diabetes management, he reports taking his Lantus twice daily (with breakfast and dinner) and the regular insulin 10 units twice daily. His Lantus dose was changed to once daily back in Sept. But MD was unable to reach him by phone to communicate this to him (per chart review). He also reports frequent low blood sugars states \"the lows are terrible\". He monitors his blood glucose once to twice daily but checks AFTER meals. Discussed his diet - consumes sweets, regular sodas, frequently.   He reports he sometimes \"forgets\" if he gave himself his insulin and will dose himself again.  Discussed how dangerous it is for him to do this. On admission he also had low BG. Likely this is due to non intentional over dosing on his insulins at home. BG trends since hospitalized have been in target without lows. Has only needed 2 units of correctional insulin since admitted on 10/29/21. His diet at home is driving his elevated blood sugars and increased insulin needs. He seems easily confused with his regimen - Will attempt to simplify his and write down what he needs to do.     11/2: BG remains in target, 100-180. Minimal correctional insulin needed. PO intake? Given recent seizure activity today    Recommendations     [x] Use of Subcutaneous Insulin Order set (4788)  Insulin Dosing Specific recommendation   CONTINUE Corrective                                       (Useful in adjusting insulin dosing) [] Normal sensitivity          Discharge Plannnig   1. Resume Lantus 30 units ONCE daily  2. Resume regular insulin 10units Twice daily  3. Follow up with PCP post hospital discharge- will route this note. Billing Code(s)   68515    Before making these care recommendations, I personally reviewed the hospitalization record, including notes, laboratory & diagnostic data and current medications, and examined the patient at the bedside (circumstances permitting) before making care recommendations.      Total minutes: [address] [facility] Aguila Brady CNS  Diabetes Clinical Nurse Specialist  Program for Diabetes [facility]  Access via [address] [place] [address]

## 2021-11-02 NOTE — PROGRESS NOTES
Hospitalist Progress Note  Jonny Charles MD  Answering service: [phone] from in house phone      Date of Service:  2021  NAME:  Brent Noonan  :  1950  MRN:  [id number]    Admission Summary:   71M p/w suspected CVA    Interval history / Subjective:   Patient seen and examined at bedside, patient had a seizure as he was being placed on gurney to take him to Echo. Assessment & Plan:     #. Acute CVAs:   - CT head: foci of acute/subacute infarcts in R- occipital area. CTA head/neck: No sig stenosis  - , A1c 7.9. Statin 'high intensity', aspirin.   - MRI: R PCA territory likely subacute infarcts, occluded R PCA- likely atherothrombotic- evaluated by [facility] who didn't think it needs any intervention and likely old. - TTE with bubble pending.  - Neuro following, needs evaluation by ophthalmology, they can't bring equipment into hospital, hence requested he follows up op closely with them. - restarted home meds, pt not been compliant. PT/OT- op therapy. #. Seizure: pt had 3 episodes of unresponsiveness, gazing, loosing tone. - repeat CT head- ? Hemorrhagic conversion, PRN ativan iv, EEG, re-consult to neurology. #. CAD: s/p CABG. stable, home regimen, monitor  #. Diastolic CHF: stable, continue home meds/diuretics. #. HTN: chronic, stable, Home regimen, PRN BP meds. Monitor  #. HLD: chronic, Stable, Home regimen  #. DM2: A1c 7.9, SSI, AccuChecks, monitor  #. COPD: no exacerbation, continue home regimen, DuoNebs PRN. #. Chronic nicotine dependence: counseled on health risks of nicotine. Nicotine patch daily  #. smokes marijuana. UDS +ve. Pt counseled on harmful effects. Encouraged to quit. Monitor   #. PEs: chronic- continue Eliquis.     Code status: Full  DVT prophylaxis: Eliquis  Care Plan discussed with: Patient/Family and Nurse  Disposition: home [facility] [place] Problems  Date Reviewed: 10/29/2021 Codes Class Noted POA    * (Principal) Acute CVA (cerebrovascular accident) [facility]) ICD-10-CM: I63.9  ICD-9-CM: 434.91  10/29/2021 Yes            Review of Systems:   Pertinent items are mentioned in interval history. Vital Signs:    Last 24hrs VS reviewed since prior progress note. Most recent are:  Visit Vitals  /89   Pulse 69   Temp 97.9 °F (36.6 °C)   Resp 16   Ht 6' (1.829 m)   Wt 102.3 kg (225 lb 8.5 oz)   SpO2 98%   BMI 30.59 kg/m²       No intake or output data in the 24 hours ending 11/02/21 1103     Physical Examination:   Evaluated face to face and examined 11/02/21    General:  Alert, partially oriented, No acute distress. Elderly BM. Card:  S1, S2, No murmurs, good peripheral perfusion, warm peripheries  Resp:  No accessory muscle use, Good AE, no wheezes. no crepitations  Abd:  Soft, non-tender, non-distended, BS+  Extremities:  No cyanosis or clubbing, no significant edema  Neuro:  alert, partially oriented, follows commands, speech understood, LUE weaker than R 3/5. Psych:  fair/poor insight, not agitated. Data Review:    Review and/or order of clinical lab test  Review and/or order of tests in the radiology section of CPT  Review and/or order of tests in the medicine section of CPT  Labs:     No results for input(s): WBC, HGB, HCT, PLT, HGBEXT, HCTEXT, PLTEXT, HGBEXT, HCTEXT, PLTEXT in the last 72 hours. No results for input(s): NA, K, CL, CO2, BUN, CREA, GLU, CA, MG, PHOS, URICA in the last 72 hours. No results for input(s): ALT, AP, TBIL, TBILI, TP, ALB, GLOB, GGT, AML, LPSE in the last 72 hours. No lab exists for component: SGOT, GPT, AMYP, HLPSE  No results for input(s): INR, PTP, APTT, INREXT, INREXT in the last 72 hours. No results for input(s): FE, TIBC, PSAT, FERR in the last 72 hours. Lab Results   Component Value Date/Time    Folate 12.8 10/30/2021 07:18 AM      No results for input(s): PH, PCO2, PO2 in the last 72 hours.   No results for input(s): CPK, CKNDX, TROIQ in the last 72 hours.     No lab exists for component: CPKMB  Lab Results   Component Value Date/Time    Cholesterol, total 202 (H) 10/30/2021 07:18 AM    HDL Cholesterol 43 10/30/2021 07:18 AM    LDL, calculated 134.8 (H) 10/30/2021 07:18 AM    Triglyceride 121 10/30/2021 07:18 AM    CHOL/HDL Ratio 4.7 10/30/2021 07:18 AM     Lab Results   Component Value Date/Time    Glucose (POC) 152 (H) 11/02/2021 07:40 AM    Glucose (POC) 145 (H) 11/01/2021 09:49 PM    Glucose (POC) 119 (H) 11/01/2021 05:07 PM    Glucose (POC) 183 (H) 11/01/2021 12:07 PM    Glucose (POC) 94 11/01/2021 08:12 AM     Lab Results   Component Value Date/Time    Color DARK YELLOW 10/29/2021 03:23 PM    Appearance CLEAR 10/29/2021 03:23 PM    Specific gravity 1.030 10/29/2021 03:23 PM    Specific gravity 1.015 01/02/2019 03:45 PM    pH (UA) 5.5 10/29/2021 03:23 PM    Protein Negative 10/29/2021 03:23 PM    Glucose Negative 10/29/2021 03:23 PM    Ketone TRACE (A) 10/29/2021 03:23 PM    Bilirubin Negative 10/29/2021 03:23 PM    Urobilinogen 1.0 10/29/2021 03:23 PM    Nitrites Negative 10/29/2021 03:23 PM    Leukocyte Esterase SMALL (A) 10/29/2021 03:23 PM    Epithelial cells FEW 10/29/2021 03:23 PM    Bacteria Negative 10/29/2021 03:23 PM    WBC 10-20 10/29/2021 03:23 PM    RBC 0-5 10/29/2021 03:23 PM     Medications Reviewed:     Current Facility-Administered Medications   Medication Dose Route Frequency    LORazepam (ATIVAN) injection 1 mg  1 mg IntraVENous Q2H PRN    apixaban (ELIQUIS) tablet 5 mg  5 mg Oral Q12H    oxyCODONE IR (ROXICODONE) tablet 5 mg  5 mg Oral Q4H PRN    albuterol-ipratropium (DUO-NEB) 2.5 MG-0.5 MG/3 ML  3 mL Nebulization Q6H PRN    amitriptyline (ELAVIL) tablet 25 mg  25 mg Oral QHS    aspirin chewable tablet 81 mg  81 mg Oral DAILY    atorvastatin (LIPITOR) tablet 80 mg  80 mg Oral DAILY    carvediloL (COREG) tablet 25 mg  25 mg Oral BID WITH MEALS    furosemide (LASIX) tablet 40 mg  40 mg Oral DAILY    lisinopriL (PRINIVIL, ZESTRIL) tablet 10 mg  10 mg Oral DAILY    nicotine (NICODERM CQ) 21 mg/24 hr patch 1 Patch  1 Patch TransDERmal Q24H    pantoprazole (PROTONIX) tablet 40 mg  40 mg Oral ACB    spironolactone (ALDACTONE) tablet 25 mg  25 mg Oral DAILY    tamsulosin (FLOMAX) capsule 0.4 mg  0.4 mg Oral DAILY    methocarbamoL (ROBAXIN) tablet 750 mg  750 mg Oral TID    acetaminophen (TYLENOL) tablet 650 mg  650 mg Oral Q4H PRN    Or    acetaminophen (TYLENOL) solution 650 mg  650 mg Per NG tube Q4H PRN    Or    acetaminophen (TYLENOL) suppository 650 mg  650 mg Rectal Q4H PRN    ondansetron (ZOFRAN) injection 4 mg  4 mg IntraVENous Q6H PRN    bisacodyL (DULCOLAX) tablet 5 mg  5 mg Oral DAILY PRN    insulin lispro (HUMALOG) injection   SubCUTAneous AC&HS    glucose chewable tablet 16 g  4 Tablet Oral PRN    dextrose (D50W) injection syrg 12.5-25 g  12.5-25 g IntraVENous PRN    glucagon (GLUCAGEN) injection 1 mg  1 mg IntraMUSCular PRN   ______________________________________________________________________  EXPECTED LENGTH OF STAY: 4d 9h  ACTUAL LENGTH OF STAY:          4               Arlin King MD

## 2021-11-02 NOTE — PROGRESS NOTES
SUBJECTIVE  Noreen Verde is a 80 y.o. male with multiple vascular risk factors, admitted with acute to subacute-appearing infarcts in the right posterior cerebral artery distribution. He has occluded right PCA likely due to underlying atherosclerosis with superimposed thrombosis. Now on aspirin and statin. Has not been compliant with his medications. Earlier today patient had an episode of near syncope. He was coming out of the bathroom when he started to fall to 1 side, was held by staff, helped to the chair but kept slumping to the side. He says that he was feeling dizzy, somewhat nauseous and room appeared as if it was spinning. He does not think he lost consciousness completely but does not remember the transportation down to CT scan. EXAM  Exam:  Visit Vitals  /74   Pulse (!) 59   Temp 98.4 °F (36.9 °C)   Resp 18   Ht 6' (1.829 m)   Wt 225 lb 8.5 oz (102.3 kg)   SpO2 98%   BMI 30.59 kg/m²     Gen:  CV: RRR  Lungs: non labored breathing  Abd: non distending  Neuro: A&O x 3, no dysarthria or aphasia  CN II-XII: PERRL, EOMI, face symmetric, tongue/palate midline  Motor: strength 5/5 all four ext  Sensory: intact to LT  Gait: Slightly favors left lower extremity    LABS/ IMAGING  CT Results (most recent):  Results from Hospital Encounter encounter on 10/29/21    CT HEAD WO CONT    Narrative  INDICATION: new seizures ? new pathology in brain- recent CVA    EXAM:  HEAD CT WITHOUT CONTRAST    COMPARISON: CTA October 29, 2021 and MRI October 30, 2021    TECHNIQUE:  Routine noncontrast axial head CT was performed. Sagittal and  coronal reconstructions were generated. CT dose reduction was achieved through use of a standardized protocol tailored  for this examination and automatic exposure control for dose modulation. FINDINGS:    Ventricles: Midline, no hydrocephalus. Intracranial Hemorrhage: None. Brain Parenchyma/Brainstem: Evolving right occipital infarction.  Extensive  chronic white matter hypodensity throughout the supratentorial brain. Chronic  right caudate and internal capsule infarction. Basal Cisterns: Normal.  Paranasal Sinuses: Visualized sinuses are clear. Additional Comments: Hyperdensity in the proximal right posterior cerebral  artery likely corresponds to occlusion on recent CTA. Impression  Extensive chronic white matter disease, and evolving nonhemorrhagic right  occipital infarction. Subtle hyperdensity right posterior cerebral artery likely  corresponds to occlusion on recent CTA. No definite new abnormality compared to  recent MRI. If high clinical concern for new abnormality recommend MRI. Lab Results   Component Value Date/Time    Cholesterol, total 202 (H) 10/30/2021 07:18 AM    Cholesterol (POC) 120 06/28/2021 09:00 AM    HDL Cholesterol 43 10/30/2021 07:18 AM    HDL Cholesterol (POC) 35 06/28/2021 09:00 AM    LDL Cholesterol (POC) 60 06/28/2021 09:00 AM    LDL, calculated 134.8 (H) 10/30/2021 07:18 AM    VLDL, calculated 24.2 10/30/2021 07:18 AM    Triglyceride 121 10/30/2021 07:18 AM    Triglycerides (POC) 124 06/28/2021 09:00 AM    CHOL/HDL Ratio 4.7 10/30/2021 07:18 AM     Echo pending    CTA head and neck     IMPRESSION  CT Head:  1. Suspected acute/subacute infarction of the right occipital lobe.      CTA Head:  1. Occlusion of the right PCA beginning at the origin with a few areas of  intermittent vascularity in the distal P2.  2. No flow-limiting stenosis or large vessel occlusion in the anterior  circulation.     CTA Neck:  1.  No large vessel occlusion or significant flow-limiting stenosis.       ASSESSMENT  Hospital Problems  Date Reviewed: 10/29/2021        Codes Class Noted POA    * (Principal) Acute CVA (cerebrovascular accident) [facility]) ICD-10-CM: I63.9  ICD-9-CM: 434.91  10/29/2021 Yes        Near syncope ICD-10-CM: R55  ICD-9-CM: 780.2  1/2/2019 Unknown               PLAN  Right PCA stroke related to atherosclerosis with superimposed thrombosis  Continue aspirin and statin  Has minimal left-sided deficits with strength and has visual field cut on the left  Needs to follow-up with ophthalmology as outpatient and avoid driving for now    The episode earlier today appears more to be a near syncopal episode rule out hyportension, bradycardia/dysrhythmia  rather than a true epileptic seizure.    However, he does have predisposition to develop seizures given right PCA stroke and EEG shows focal right-sided slowing  Reasonable to continue Keppra for at least a month but may not needed for long-term    Lori Cruz MD

## 2021-11-03 ENCOUNTER — APPOINTMENT (OUTPATIENT)
Dept: NON INVASIVE DIAGNOSTICS | Age: 71
DRG: 045 | End: 2021-11-03
Attending: INTERNAL MEDICINE
Payer: MEDICAID

## 2021-11-03 LAB
ECHO AO ROOT DIAM: 3.48 CM
ECHO AV AREA PEAK VELOCITY: 3.56 CM2
ECHO AV AREA/BSA PEAK VELOCITY: 1.6 CM2/M2
ECHO AV PEAK GRADIENT: 4.77 MMHG
ECHO AV PEAK VELOCITY: 109.21 CM/S
ECHO LA AREA 4C: 25.31 CM2
ECHO LA MAJOR AXIS: 5.11 CM
ECHO LA MINOR AXIS: 2.28 CM
ECHO LA VOL 2C: 73.22 ML (ref 18–58)
ECHO LA VOL 4C: 83.23 ML (ref 18–58)
ECHO LA VOL BP: 86.38 ML (ref 18–58)
ECHO LA VOL/BSA BIPLANE: 38.56 ML/M2 (ref 16–28)
ECHO LA VOLUME INDEX A2C: 32.69 ML/M2 (ref 16–28)
ECHO LA VOLUME INDEX A4C: 37.16 ML/M2 (ref 16–28)
ECHO LV INTERNAL DIMENSION DIASTOLIC: 5.45 CM (ref 4.2–5.9)
ECHO LV INTERNAL DIMENSION SYSTOLIC: 3.83 CM
ECHO LV IVSD: 1.1 CM (ref 0.6–1)
ECHO LV MASS 2D: 251.7 G (ref 88–224)
ECHO LV MASS INDEX 2D: 112.4 G/M2 (ref 49–115)
ECHO LV POSTERIOR WALL DIASTOLIC: 1.19 CM (ref 0.6–1)
ECHO LVOT DIAM: 2.46 CM
ECHO LVOT PEAK GRADIENT: 2.69 MMHG
ECHO LVOT PEAK VELOCITY: 81.97 CM/S
ECHO MV A VELOCITY: 64.41 CM/S
ECHO MV AREA PHT: 2.5 CM2
ECHO MV E DECELERATION TIME (DT): 303.19 MS
ECHO MV E VELOCITY: 37.55 CM/S
ECHO MV E/A RATIO: 0.58
ECHO MV PRESSURE HALF TIME (PHT): 87.93 MS
ECHO PV PEAK INSTANTANEOUS GRADIENT SYSTOLIC: 3.02 MMHG
ECHO RV INTERNAL DIMENSION: 4.57 CM
ECHO RV TAPSE: 2.26 CM (ref 1.5–2)
GLUCOSE BLD STRIP.AUTO-MCNC: 116 MG/DL (ref 65–117)
GLUCOSE BLD STRIP.AUTO-MCNC: 161 MG/DL (ref 65–117)
GLUCOSE BLD STRIP.AUTO-MCNC: 176 MG/DL (ref 65–117)
GLUCOSE BLD STRIP.AUTO-MCNC: 223 MG/DL (ref 65–117)
SERVICE CMNT-IMP: ABNORMAL
SERVICE CMNT-IMP: NORMAL

## 2021-11-03 PROCEDURE — 97112 NEUROMUSCULAR REEDUCATION: CPT

## 2021-11-03 PROCEDURE — 97116 GAIT TRAINING THERAPY: CPT

## 2021-11-03 PROCEDURE — 93306 TTE W/DOPPLER COMPLETE: CPT | Performed by: INTERNAL MEDICINE

## 2021-11-03 PROCEDURE — 99231 SBSQ HOSP IP/OBS SF/LOW 25: CPT | Performed by: CLINICAL NURSE SPECIALIST

## 2021-11-03 PROCEDURE — 74011250636 HC RX REV CODE- 250/636: Performed by: INTERNAL MEDICINE

## 2021-11-03 PROCEDURE — 82962 GLUCOSE BLOOD TEST: CPT

## 2021-11-03 PROCEDURE — 74011250637 HC RX REV CODE- 250/637: Performed by: INTERNAL MEDICINE

## 2021-11-03 PROCEDURE — 97535 SELF CARE MNGMENT TRAINING: CPT

## 2021-11-03 PROCEDURE — C8929 TTE W OR WO FOL WCON,DOPPLER: HCPCS

## 2021-11-03 PROCEDURE — 74011636637 HC RX REV CODE- 636/637: Performed by: INTERNAL MEDICINE

## 2021-11-03 PROCEDURE — 74011000258 HC RX REV CODE- 258: Performed by: INTERNAL MEDICINE

## 2021-11-03 PROCEDURE — 65660000000 HC RM CCU STEPDOWN

## 2021-11-03 PROCEDURE — 97530 THERAPEUTIC ACTIVITIES: CPT

## 2021-11-03 RX ORDER — GUAIFENESIN 100 MG/5ML
81 LIQUID (ML) ORAL DAILY
Qty: 30 TABLET | Refills: 0 | Status: SHIPPED | OUTPATIENT
Start: 2021-11-03 | End: 2021-12-03

## 2021-11-03 RX ORDER — LEVETIRACETAM 500 MG/1
500 TABLET ORAL 2 TIMES DAILY
Qty: 60 TABLET | Refills: 0 | Status: SHIPPED | OUTPATIENT
Start: 2021-11-03 | End: 2021-12-03

## 2021-11-03 RX ORDER — CARVEDILOL 6.25 MG/1
6.25 TABLET ORAL 2 TIMES DAILY WITH MEALS
Qty: 60 TABLET | Refills: 0 | Status: SHIPPED | OUTPATIENT
Start: 2021-11-03 | End: 2021-12-03

## 2021-11-03 RX ORDER — ATORVASTATIN CALCIUM 40 MG/1
80 TABLET, FILM COATED ORAL DAILY
Qty: 60 TABLET | Refills: 0 | Status: SHIPPED | OUTPATIENT
Start: 2021-11-03 | End: 2021-12-03

## 2021-11-03 RX ADMIN — METHOCARBAMOL 750 MG: 750 TABLET ORAL at 17:10

## 2021-11-03 RX ADMIN — CARVEDILOL 6.25 MG: 3.12 TABLET, FILM COATED ORAL at 09:00

## 2021-11-03 RX ADMIN — APIXABAN 5 MG: 5 TABLET, FILM COATED ORAL at 09:00

## 2021-11-03 RX ADMIN — LEVETIRACETAM 500 MG: 100 INJECTION, SOLUTION, CONCENTRATE INTRAVENOUS at 22:46

## 2021-11-03 RX ADMIN — INSULIN LISPRO 2 UNITS: 100 INJECTION, SOLUTION INTRAVENOUS; SUBCUTANEOUS at 09:06

## 2021-11-03 RX ADMIN — PERFLUTREN 2 ML: 6.52 INJECTION, SUSPENSION INTRAVENOUS at 10:22

## 2021-11-03 RX ADMIN — APIXABAN 5 MG: 5 TABLET, FILM COATED ORAL at 22:34

## 2021-11-03 RX ADMIN — LISINOPRIL 10 MG: 10 TABLET ORAL at 09:00

## 2021-11-03 RX ADMIN — METHOCARBAMOL 750 MG: 750 TABLET ORAL at 22:34

## 2021-11-03 RX ADMIN — TAMSULOSIN HYDROCHLORIDE 0.4 MG: 0.4 CAPSULE ORAL at 09:00

## 2021-11-03 RX ADMIN — SPIRONOLACTONE 25 MG: 25 TABLET ORAL at 08:59

## 2021-11-03 RX ADMIN — LEVETIRACETAM 500 MG: 100 INJECTION, SOLUTION, CONCENTRATE INTRAVENOUS at 09:09

## 2021-11-03 RX ADMIN — INSULIN LISPRO 2 UNITS: 100 INJECTION, SOLUTION INTRAVENOUS; SUBCUTANEOUS at 12:27

## 2021-11-03 RX ADMIN — INSULIN LISPRO 2 UNITS: 100 INJECTION, SOLUTION INTRAVENOUS; SUBCUTANEOUS at 22:34

## 2021-11-03 RX ADMIN — FUROSEMIDE 40 MG: 40 TABLET ORAL at 09:00

## 2021-11-03 RX ADMIN — OXYCODONE 5 MG: 5 TABLET ORAL at 18:08

## 2021-11-03 RX ADMIN — ASPIRIN 81 MG CHEWABLE TABLET 81 MG: 81 TABLET CHEWABLE at 08:59

## 2021-11-03 RX ADMIN — ATORVASTATIN CALCIUM 80 MG: 40 TABLET, FILM COATED ORAL at 09:00

## 2021-11-03 RX ADMIN — PANTOPRAZOLE SODIUM 40 MG: 40 TABLET, DELAYED RELEASE ORAL at 08:59

## 2021-11-03 RX ADMIN — METHOCARBAMOL 750 MG: 750 TABLET ORAL at 09:00

## 2021-11-03 RX ADMIN — CARVEDILOL 6.25 MG: 3.12 TABLET, FILM COATED ORAL at 17:10

## 2021-11-03 RX ADMIN — AMITRIPTYLINE HYDROCHLORIDE 25 MG: 25 TABLET, FILM COATED ORAL at 22:34

## 2021-11-03 NOTE — PROGRESS NOTES
Problem: Mobility Impaired (Adult and Pediatric)  Goal: *Acute Goals and Plan of Care (Insert Text)  Description: FUNCTIONAL STATUS PRIOR TO ADMISSION: Patient was independent and active without use of DME.    HOME SUPPORT PRIOR TO ADMISSION: The patient lived with three roommates. Questionable living situation? Physical Therapy Goals  Initiated 10/30/2021  1. Patient will move from supine to sit and sit to supine  in bed with modified independence within 7 day(s). 2.  Patient will transfer from bed to chair and chair to bed with modified independence using the least restrictive device within 7 day(s). 3.  Patient will perform sit to stand with modified independence within 7 day(s). 4.  Patient will ambulate with modified independence for 150 feet with the least restrictive device within 7 day(s). 5.  Patient will ascend/descend 12 stairs with 1 handrail(s) with modified independence within 7 day(s). Outcome: Progressing Towards Goal   PHYSICAL THERAPY TREATMENT  Patient: Afsaneh Mike (39 y.o. male)  Date: 11/3/2021  Diagnosis: Acute CVA (cerebrovascular accident) [facility]) [I63.9]   Acute CVA (cerebrovascular accident) [facility])       Precautions: Fall, Spinal (precautions for back pain; Open heart surgery 2017)  Chart, physical therapy assessment, plan of care and goals were reviewed. ASSESSMENT  Patient continues with skilled PT services and is progressing towards goals. Pt received supine in bed and agreeable to therapy. Pt tolerated session fairly well. Pt with impaired vision on the L and L inattention that appears to be worse than previous sessions, decreased functional mobility, impaired balance and gait, decreased safety awareness, decreased insight into deficits, and difficulty compensating for impairments. Pt also reported dizziness during transitional movements and after ambulation (see below for vitals).  Pt tolerated gait training with CGA demonstrating wide BASSEM, decreased mary and poor awareness of L side of environment. OT presented themselves to the L side of environment and walked quickly in front of patient and pt did not even know they were present until on the R side of him. Pt with difficulty acknowledging and identifying objects on the L. Pt with episode of unresponsiveness yesterday and reported feeling dizzy just prior to this occurrence. Pt will benefit from inpatient rehab upon discharge to continue therapy efforts, maximize safety, reduce risk of falls and injury. Discussed with CM and MD>    Vitals:    11/03/21 1105 11/03/21 1110 11/03/21 1114 11/03/21 1117   BP: 119/69 94/81 102/73 99/70   BP 1 Location: Right upper arm Right upper arm Right upper arm Left upper arm   BP Patient Position: Supine Sitting Standing Standing  Comment: reporting dizziness, after gait   Pulse: (!) 59 64 61 77   Temp:       Resp:       Height:       Weight:       SpO2:             Current Level of Function Impacting Discharge (mobility/balance): CGA gait training x 40 ft; poor spatial and environmental awareness on the L--MAX verbal cues to attend to L side    Other factors to consider for discharge: previously independent, lives with roommates         PLAN :  Patient continues to benefit from skilled intervention to address the above impairments. Continue treatment per established plan of care. to address goals. Recommendation for discharge: (in order for the patient to meet his/her long term goals)  Therapy 3 hours per day 5-7 days per week  If pt were to d/c home would need 24 hour supervision especially when outside of the home (pt reported he walks to and from the grocery store at times), HHPT/OT. This discharge recommendation:  Has been made in collaboration with the attending provider and/or case management    IF patient discharges home will need the following DME: none       SUBJECTIVE:   Patient stated I have passed out all my life. Even when I was a juvenile.     OBJECTIVE DATA SUMMARY:   Critical Behavior:  Neurologic State: Alert  Orientation Level: Oriented X4  Cognition: Follows commands  Safety/Judgement: Decreased insight into deficits, Decreased awareness of need for safety (receptive to learning about visual deficits)  Functional Mobility Training:  Bed Mobility:     Supine to Sit: Supervision  Sit to Supine: Supervision           Transfers:  Sit to Stand: Stand-by assistance  Stand to Sit: Stand-by assistance                             Balance:  Sitting: Intact  Standing: Impaired  Standing - Static: Constant support; Good  Standing - Dynamic : Constant support; Fair  Ambulation/Gait Training:  Distance (ft): 40 Feet (ft)  Assistive Device: Gait belt  Ambulation - Level of Assistance: Stand-by assistance; Contact guard assistance                 Base of Support: Widened     Speed/Kina: Slow  Step Length: Right shortened; Left shortened                    Stairs: Therapeutic Exercises:     Pain Rating:  Pt tay cavazos    Activity Tolerance:   Fair and signs and symptoms of orthostatic hypotension    After treatment patient left in no apparent distress:   Supine in bed, Call bell within reach, Bed / chair alarm activated, and Side rails x 3    COMMUNICATION/COLLABORATION:   The patients plan of care was discussed with: Occupational therapist, Registered nurse, Physician, and Case management.      Narendra Gold, PT, DPT   Time Calculation: 25 mins

## 2021-11-03 NOTE — PROGRESS NOTES
Pharmacist Discharge Medication Reconciliation    Discharging Provider: Dr. Vale Myo PMH:   Past Medical History:   Diagnosis Date    CAD (coronary artery disease)     Diabetes ([facility] [address]     GERD (gastroesophageal reflux disease)     Heart failure ([facility] [address]     Hypertension      Chief Complaint for this Admission:   Chief Complaint   Patient presents with    Fatigue    Blood sugar problem     Allergies: Patient has no known allergies. Discharge Medications:   Current Discharge Medication List        START taking these medications    Details   levETIRAcetam (Keppra) 500 mg tablet Take 1 Tablet by mouth two (2) times a day for 30 days. Qty: 60 Tablet, Refills: 0  Start date: 11/3/2021, End date: 12/3/2021           CONTINUE these medications which have CHANGED    Details   aspirin 81 mg chewable tablet Take 1 Tablet by mouth daily for 30 days. Qty: 30 Tablet, Refills: 0  Start date: 11/3/2021, End date: 12/3/2021    Associated Diagnoses: Coronary artery disease without angina pectoris, unspecified vessel or lesion type, unspecified whether native or transplanted heart      apixaban (ELIQUIS) 5 mg tablet Take 1 Tablet by mouth two (2) times a day for 30 days. Qty: 60 Tablet, Refills: 0  Start date: 11/3/2021, End date: 12/3/2021    Associated Diagnoses: Acute pulmonary embolism, unspecified pulmonary embolism type, unspecified whether acute cor pulmonale present (HCC)      carvediloL (COREG) 6.25 mg tablet Take 1 Tablet by mouth two (2) times daily (with meals) for 30 days. Qty: 60 Tablet, Refills: 0  Start date: 11/3/2021, End date: 12/3/2021      !! atorvastatin (LIPITOR) 40 mg tablet Take 2 Tablets by mouth daily for 30 days. Qty: 60 Tablet, Refills: 0  Start date: 11/3/2021, End date: 12/3/2021       !! - Potential duplicate medications found. Please discuss with provider.         CONTINUE these medications which have NOT CHANGED    Details   insulin glargine (Lantus Solostar U-100 Insulin) 100 unit/mL (3 mL) inpn 30 Units by SubCUTAneous route daily. Qty: 3 Pen, Refills: 0    Associated Diagnoses: Type 2 diabetes mellitus treated with insulin (formerly [facility])      omeprazole (PRILOSEC) 40 mg capsule Take 1 Capsule by mouth daily. Qty: 30 Capsule, Refills: 0    Associated Diagnoses: Gastroesophageal reflux disease without esophagitis      lisinopriL (PRINIVIL, ZESTRIL) 10 mg tablet Take 1 Tablet by mouth daily. Qty: 30 Tablet, Refills: 0    Comments: Needs office visit      spironolactone (ALDACTONE) 25 mg tablet Take 1 Tablet by mouth daily. Qty: 30 Tablet, Refills: 0    Comments: Needs office visit      amitriptyline (ELAVIL) 25 mg tablet Take  by mouth nightly. nicotine (NICODERM CQ) 21 mg/24 hr 1 Patch by TransDERmal route every twenty-four (24) hours. cyclobenzaprine (FLEXERIL) 10 mg tablet TAKE 1 TABLET BY MOUTH THREE TIMES DAILY AS NEEDED FOR MUSCLE SPASMS      !! methocarbamoL (Robaxin-750) 750 mg tablet Take 1 Tablet by mouth three (3) times daily. Qty: 15 Tablet, Refills: 0      lidocaine (Lidoderm) 5 % Apply patch to the affected area for 12 hours a day and remove for 12 hours a day. Qty: 5 Each, Refills: 0      acetaminophen (TYLENOL) 325 mg tablet TAKE 3 TABLETS BY MOUTH EVERY 8 HOURS FOR 14 DAYS. (GENERIC FOR TYLENOL)      ! ! methocarbamoL (ROBAXIN) 500 mg tablet TAKE ONE TABLET BY MOUTH TWICE A DAY      polyethylene glycol (MIRALAX) 17 gram/dose powder MIX 17 GRAMS (1 CAPFUL) IN 4 TO 8 OUNCES OF LIQUID AND DRINK ONCE DAILY. !! tamsulosin (FLOMAX) 0.4 mg capsule 1 cap(s)      Blood-Glucose Meter monitoring kit Patient with Type II DM needs glucometer for blood glucose monitoring. DX code E11.9  Qty: 1 Kit, Refills: 0    Associated Diagnoses: Type II diabetes mellitus with complication (formerly [facility])      glucose blood VI test strips (blood glucose test) strip Please measure blood glucose 2 times per day.  DX code: E11.9  Qty: 100 Strip, Refills: 2    Associated Diagnoses: Type II diabetes mellitus with complication (HCC)      lancets misc Please measure blood glucose 2 times per day. DX Code E11.9  Qty: 1 Each, Refills: 2    Associated Diagnoses: Type II diabetes mellitus with complication ([facility] [place] [address]      multivit-min/FA/lycopen/lutein (CENTRUM SILVER MEN PO) Take  by mouth. albuterol (PROVENTIL HFA, VENTOLIN HFA, PROAIR HFA) 90 mcg/actuation inhaler Take 2 Puffs by inhalation every four (4) hours as needed for Wheezing for up to 360 days. Qty: 1 Inhaler, Refills: 2    Associated Diagnoses: History of asthma      Insulin Needles, Disposable, (Comfort EZ Pen Needles) 32 gauge x 5/32\" ndle E11.6 use for insulin SQ injection as directed  Qty: 100 Pen Needle, Refills: 2    Associated Diagnoses: Type 2 diabetes mellitus treated with insulin ([facility])      Insulin Syringe-Needle U-100 0.5 mL 30 gauge x 5/16\" syrg Use as directed BID  Qty: 200 Syringe, Refills: 3      insulin regular (NOVOLIN R, HUMULIN R) 100 unit/mL injection 10 Units by SubCUTAneous route two (2) times a day. Qty: 3 Vial, Refills: 2    Associated Diagnoses: Type 2 diabetes mellitus treated with insulin ([facility])      isosorbide-hydrALAZINE (BiDiL) 20-37.5 mg per tablet Take 1 Tab by mouth three (3) times daily. Qty: 80 Tab, Refills: 0    Associated Diagnoses: Coronary artery disease without angina pectoris, unspecified vessel or lesion type, unspecified whether native or transplanted heart      !! atorvastatin (LIPITOR) 40 mg tablet Take 1 Tab by mouth daily. Qty: 30 Tab, Refills: 0    Associated Diagnoses: Coronary artery disease without angina pectoris, unspecified vessel or lesion type, unspecified whether native or transplanted heart      hydroCHLOROthiazide (HYDRODIURIL) 25 mg tablet Take 1 Tab by mouth daily. Qty: 30 Tab, Refills: 0    Associated Diagnoses: Essential hypertension      metFORMIN (GLUCOPHAGE) 1,000 mg tablet Take 1 Tab by mouth two (2) times daily (with meals).   Qty: 60 Tab, Refills: 0    Associated Diagnoses: Type 2 diabetes mellitus [facility]      ! ! tamsulosin (FLOMAX) 0.4 mg capsule Take 1 Cap by mouth daily. Qty: 30 Cap, Refills: 0      furosemide (LASIX) 40 mg tablet Take 1 Tab by mouth daily. Qty: 30 Tab, Refills: 0    Associated Diagnoses: Essential hypertension      ergocalciferol (ERGOCALCIFEROL) 1,250 mcg (50,000 unit) capsule TK 1 C PO 1 TIME A WK       !! - Potential duplicate medications found. Please discuss with provider. The patient's chart, MAR and AVS were reviewed by Chuyita Reese.

## 2021-11-03 NOTE — PROGRESS NOTES
Hospitalist Progress Note  Deon De La Rosa MD  Answering service: [phone] from in house phone      Date of Service:  11/3/2021  NAME:  Amrita Stanton  :  1950  MRN:  [id number]    Admission Summary:   71M p/w suspected CVA    Interval history / Subjective:   Patient seen and examined at bedside, patient feels ok, when got up with therapy today, did very poorly, neglecting L side, needs rehab for dc. Assessment & Plan:     #. Acute CVAs:   - CT head: foci of acute/subacute infarcts in R- occipital area. CTA head/neck: No sig stenosis  - , A1c 7.9. Statin 'high intensity', aspirin.   - MRI: R PCA territory likely subacute infarcts, occluded R PCA- likely atherothrombotic- evaluated by [facility] who didn't think it needs any intervention and likely old. - TTE with bubble pending.  - Neuro following, needs evaluation by ophthalmology, they can't bring equipment into hospital, hence requested he follows up op closely with them. - restarted home meds, pt not been compliant. PT/OT- worsening performance, needs rehab    #. Orthostatic hypotension: dropped by 20 points, and felt dizzy. - was initially Suspect Seizure: pt had 3 episodes of unresponsiveness, gazing, losing tone. No clear convulsion.  - repeat CT head: NAD. PRN ativan iv, EEG: slowing on R side,   - re-consult to neurology- Keppra for 1 month. #. CAD: s/p CABG. stable, home regimen, monitor  #. Diastolic CHF: stable, continue home meds/diuretics. #. HTN: chronic, stable, Home regimen, PRN BP meds. Monitor  #. HLD: chronic, Stable, Home regimen  #. DM2: A1c 7.9, SSI, AccuChecks, monitor  #. COPD: no exacerbation, continue home regimen, DuoNebs PRN. #. Chronic nicotine dependence: counseled on health risks of nicotine. Nicotine patch daily  #. smokes marijuana. UDS +ve. Pt counseled on harmful effects. Encouraged to quit.  Monitor   #. PEs: chronic- continue Eliquis. Code status: Full  DVT prophylaxis: Eliquis  Care Plan discussed with: Patient/Family and Nurse  Disposition: [facility] [place] Problems  Date Reviewed: 10/29/2021          Codes Class Noted POA    * (Principal) Acute CVA (cerebrovascular accident) [facility]) ICD-10-CM: I63.9  ICD-9-CM: 434.91  10/29/2021 Yes        Near syncope ICD-10-CM: R55  ICD-9-CM: 780.2  1/2/2019 Unknown            Review of Systems:   Pertinent items are mentioned in interval history. Vital Signs:    Last 24hrs VS reviewed since prior progress note. Most recent are:  Visit Vitals  /82   Pulse 67   Temp 98 °F (36.7 °C)   Resp 17   Ht 6' (1.829 m)   Wt 102.1 kg (225 lb)   SpO2 94%   BMI 30.52 kg/m²       No intake or output data in the 24 hours ending 11/03/21 1019     Physical Examination:   Evaluated face to face and examined 11/03/21    General:  Alert, partially oriented, No acute distress. Elderly BM. Card:  S1, S2, No murmurs, good peripheral perfusion, warm peripheries  Resp:  No accessory muscle use, Good AE, no wheezes. no crepitations  Abd:  Soft, non-tender, non-distended, BS+  Extremities:  No cyanosis or clubbing, no significant edema  Neuro:  alert, partially oriented, follows commands, speech understood, LUE weaker than R 3/5. Psych:  fair/poor insight, not agitated. Data Review:    Review and/or order of clinical lab test  Review and/or order of tests in the radiology section of CPT  Review and/or order of tests in the medicine section of CPT  Labs:     No results for input(s): WBC, HGB, HCT, PLT, HGBEXT, HCTEXT, PLTEXT, HGBEXT, HCTEXT, PLTEXT in the last 72 hours. No results for input(s): NA, K, CL, CO2, BUN, CREA, GLU, CA, MG, PHOS, URICA in the last 72 hours. No results for input(s): ALT, AP, TBIL, TBILI, TP, ALB, GLOB, GGT, AML, LPSE in the last 72 hours. No lab exists for component: SGOT, GPT, AMYP, HLPSE  No results for input(s): INR, PTP, APTT, INREXT, INREXT in the last 72 hours.    No results for input(s): FE, TIBC, PSAT, FERR in the last 72 hours. Lab Results   Component Value Date/Time    Folate 12.8 10/30/2021 07:18 AM      No results for input(s): PH, PCO2, PO2 in the last 72 hours. No results for input(s): CPK, CKNDX, TROIQ in the last 72 hours.     No lab exists for component: CPKMB  Lab Results   Component Value Date/Time    Cholesterol, total 202 (H) 10/30/2021 07:18 AM    HDL Cholesterol 43 10/30/2021 07:18 AM    LDL, calculated 134.8 (H) 10/30/2021 07:18 AM    Triglyceride 121 10/30/2021 07:18 AM    CHOL/HDL Ratio 4.7 10/30/2021 07:18 AM     Lab Results   Component Value Date/Time    Glucose (POC) 161 (H) 11/03/2021 08:08 AM    Glucose (POC) 177 (H) 11/02/2021 09:48 PM    Glucose (POC) 213 (H) 11/02/2021 04:16 PM    Glucose (POC) 162 (H) 11/02/2021 11:13 AM    Glucose (POC) 152 (H) 11/02/2021 07:40 AM     Lab Results   Component Value Date/Time    Color DARK YELLOW 10/29/2021 03:23 PM    Appearance CLEAR 10/29/2021 03:23 PM    Specific gravity 1.030 10/29/2021 03:23 PM    Specific gravity 1.015 01/02/2019 03:45 PM    pH (UA) 5.5 10/29/2021 03:23 PM    Protein Negative 10/29/2021 03:23 PM    Glucose Negative 10/29/2021 03:23 PM    Ketone TRACE (A) 10/29/2021 03:23 PM    Bilirubin Negative 10/29/2021 03:23 PM    Urobilinogen 1.0 10/29/2021 03:23 PM    Nitrites Negative 10/29/2021 03:23 PM    Leukocyte Esterase SMALL (A) 10/29/2021 03:23 PM    Epithelial cells FEW 10/29/2021 03:23 PM    Bacteria Negative 10/29/2021 03:23 PM    WBC 10-20 10/29/2021 03:23 PM    RBC 0-5 10/29/2021 03:23 PM     Medications Reviewed:     Current Facility-Administered Medications   Medication Dose Route Frequency    LORazepam (ATIVAN) injection 1 mg  1 mg IntraVENous Q2H PRN    levETIRAcetam (KEPPRA) 500 mg in 0.9% sodium chloride 100 mL IVPB  500 mg IntraVENous Q12H    carvediloL (COREG) tablet 6.25 mg  6.25 mg Oral BID WITH MEALS    apixaban (ELIQUIS) tablet 5 mg  5 mg Oral Q12H    oxyCODONE IR (ROXICODONE) tablet 5 mg  5 mg Oral Q4H PRN    albuterol-ipratropium (DUO-NEB) 2.5 MG-0.5 MG/3 ML  3 mL Nebulization Q6H PRN    amitriptyline (ELAVIL) tablet 25 mg  25 mg Oral QHS    aspirin chewable tablet 81 mg  81 mg Oral DAILY    atorvastatin (LIPITOR) tablet 80 mg  80 mg Oral DAILY    furosemide (LASIX) tablet 40 mg  40 mg Oral DAILY    lisinopriL (PRINIVIL, ZESTRIL) tablet 10 mg  10 mg Oral DAILY    nicotine (NICODERM CQ) 21 mg/24 hr patch 1 Patch  1 Patch TransDERmal Q24H    pantoprazole (PROTONIX) tablet 40 mg  40 mg Oral ACB    spironolactone (ALDACTONE) tablet 25 mg  25 mg Oral DAILY    tamsulosin (FLOMAX) capsule 0.4 mg  0.4 mg Oral DAILY    methocarbamoL (ROBAXIN) tablet 750 mg  750 mg Oral TID    acetaminophen (TYLENOL) tablet 650 mg  650 mg Oral Q4H PRN    Or    acetaminophen (TYLENOL) solution 650 mg  650 mg Per NG tube Q4H PRN    Or    acetaminophen (TYLENOL) suppository 650 mg  650 mg Rectal Q4H PRN    ondansetron (ZOFRAN) injection 4 mg  4 mg IntraVENous Q6H PRN    bisacodyL (DULCOLAX) tablet 5 mg  5 mg Oral DAILY PRN    insulin lispro (HUMALOG) injection   SubCUTAneous AC&HS    glucose chewable tablet 16 g  4 Tablet Oral PRN    dextrose (D50W) injection syrg 12.5-25 g  12.5-25 g IntraVENous PRN    glucagon (GLUCAGEN) injection 1 mg  1 mg IntraMUSCular PRN   ______________________________________________________________________  EXPECTED LENGTH OF STAY: 4d 9h  ACTUAL LENGTH OF STAY:          5               Quincy Herrera MD

## 2021-11-03 NOTE — PROGRESS NOTES
Bedside shift change report given to Shi Acosta RN (oncoming nurse) by Margarita Gibbons RN (offgoing nurse). Report included the following information SBAR, Kardex, Intake/Output, MAR, Recent Results, Cardiac Rhythm NSR, Alarm Parameters , Quality Measures and Dual Neuro Assessment.

## 2021-11-03 NOTE — PROGRESS NOTES
Hospital follow-up PCP transitional care appointment has been scheduled with Dr. Chandrika Campa for Monday, 11/8/21 at 10:30 a.m. Pending patient discharge.   Efraín Gay, Care Management Specialist.

## 2021-11-03 NOTE — PROGRESS NOTES
Bedside and Verbal shift change report given to [address] (oncoming nurse) by [id number] (offgoing nurse). Report included the following information SBAR, Kardex, [facility], Cardiac Rhythm Sinus Sasha Shake and Dual Neuro Assessment.

## 2021-11-03 NOTE — DIABETES MGMT
[facility] [facility]    CLINICAL NURSE SPECIALIST CONSULT     FOLLOW UP  NOTE    Initial Presentation   Lisa Kinney is a 80 y.o. male admitted 10/29/21 with c/o generalized weakness and SOB. CT of head showed acute CVA. HX:   Past Medical History:   Diagnosis Date    CAD (coronary artery disease)     Diabetes ([facility]     GERD (gastroesophageal reflux disease)     Heart failure (HCC)     Hypertension         INITIAL DX: Acute CVA (cerebrovascular accident) ([facility] [I63.9]    Current Treatment     TX: MRI-1. Late acute versus subacute infarcts along the right PCA territory involve the  basal ganglia, thalamus, corpus callosum, and occipital lobe. Evidence of  associated petechial hemorrhage. 2. Extensive chronic microvascular ischemic disease  / ECHO-pending/ neurology consult/ B12-WNL/ TSH- WNL/    Consulted by Nursing for advanced diabetes nursing assessment and care for:   [x] Home management assessment      Hospital Course   Clinical progress has been complicated by acute CVA in setting of CAD/ and  Controlled DM2. .      11/2/21: noted seizure activity today, 3 episodes of unresponsiveness/gazing/losing tone. STAT EEG completed. Repeat CT scan completed-evolving nonhemorrhagic right occipital infarction  Diabetes History   DM2- A1C in goal- 6.5%. PCP-Cha Calhoun-last communication in Sept 2020 when Lantus dose changed to ONCE daily. She attempted to call him and notify him of this but was unable to reach him. Notes say he needed to make a follow up appt.      Diabetes-related Medical History  Acute complications  NONE  Neurological complications  Peripheral neuropathy  Microvascular disease  NONE  Macrovascular disease  CAD and Cerebral vascular accident  Other associated conditions     HTN/ HF    Diabetes Medication History  Drug class Currently in use Discontinued Never used   Biguanide  Metformin-I stopped b/c I had diarrhea    DDP-4 inhibitor       Sulfonylurea Thiazolidinedione      GLP-1 RA      SGLT-2 inhibitors      Basal insulin Lantus 30 units daily   (states he gives himself 30 units BID, instead of once daily)     Bolus insulin Regular insulin 10 units twice daily     Fixed Dose  Combinations        Subjective   Patient found resting in bed    Has reported frequent low blood sugars lately, likely due to too much insulin   Has been giving him self 30 units Lantus in AM and PM (when prescribed as once daily)    Patient lives with roommates in a house, no family or children      Patient reports the following home diabetes self-management practices:   Eating pattern-poor dentition (3 teeth left)   [x] Eating a carbohydrate-controlled mealplan  [x] Breakfast Corn beef hash/ boiled egg/ coffee   Lunch  Fried fish sandwich/FF or chips/ regular ginger ale  [x] Dinner  \"Whatever my roommates cook\"  [x] Snacks Loves soft sweets: honey bun/ cakes/ ice cream  [x] Beverages Water/ sodas  Physical activity pattern - mostly sedentary     Monitoring pattern- checks BG twice daily AFTER meals -mostly high >150. Taking medications pattern  [x] Consistent administration  [x] Affordable    Social determinants of health impacting diabetes self-management practices   Concerned that you need to know more about how to stay healthy with diabetes    Overall evaluation:    [x] Achieving A1c target with drug therapy & self-care practices     Objective   Physical exam  General Over weight male in no acute distress. Conversant and cooperative  Neuro  Alert, oriented   Vital Signs   Visit Vitals  /72 (BP 1 Location: Left upper arm, BP Patient Position: Supine;Lying right side)   Pulse 73   Temp 98.5 °F (36.9 °C)   Resp 17   Ht 6' (1.829 m)   Wt 101.5 kg (223 lb 12.3 oz)   SpO2 94%   BMI 30.35 kg/m²     Skin  Warm and dry. Heart   Regular rate and rhythm.  No murmurs, rubs or gallops  Lungs  Clear to auscultation without rales or rhonchi  Extremities No foot wounds    Diabetic foot exam:    Left Foot     Visual Exam: normal    Pulse DP: 2+ (normal)   Filament test: reduced sensation -bottom of toes     Right Foot   Visual Exam: normal    Pulse DP: 2+ (normal)   Filament test: reduced sensation -bottom of toes    DP & PT pulses +2. Laboratory  Tests 10/30   A1c 6.5%      Anion gap -   Serum triglycerides 121   WBC -   Serum creatinine 0.68   GFR >60   AST WNL   ALT WNL   cholesterol 202      B-12 361   TSH WNL     No results found for this visit on 10/29/21 (from the past 12 hour(s)). No results found for this visit on 10/29/21 (from the past 12 hour(s)). No results found for this visit on 10/29/21 (from the past 6 hour(s)). Factors impacting BG management  Factor Dose Comments   Nutrition:  Standard meals     60 grams/meal      Other:   Kidney function  Liver function     WNL  WNL      Blood glucose pattern        Assessment and Plan   Nursing Diagnosis Risk for unstable blood glucose pattern   Nursing Intervention Domain 5252 Decision-making Support   Nursing Interventions Examined current inpatient diabetes control   Explored factors facilitating and impeding inpatient management  Identified self-management practices impeding diabetes control  Explored corrective strategies with patient and responsible inpatient provider   Informed patient of rational for insulin strategy while hospitalized  Instructed patient in :  Check BG before meals   Taking Lantus once a day     Evaluation   This AA male, with controlled Type 2 diabetes is currently admitted for acute CVA. Re: diabetes management, he reports taking his Lantus twice daily (with breakfast and dinner) and the regular insulin 10 units twice daily. His Lantus dose was changed to once daily back in Sept. But MD was unable to reach him by phone to communicate this to him. He also reports frequent low blood sugars states \"the lows are terrible\". He monitors his blood glucose once to twice daily but checks AFTER meals. Discussed his diet - consumes sweets, regular sodas, frequently. He reports he sometimes \"forgets\" if he gave himself his insulin and will dose himself again. Discussed how dangerous it is for him to do this. On admission he also had low BG. Likely this is due to high basal insulin doses at home. BG trends since hospitalized have been in target without lows. Has only needed 2 units of correctional insulin since admitted on 10/29/21. His diet at home is driving his elevated blood sugars and increased insulin needs. He seems easily confused with his regimen - Will attempt to simplify his and write down what he needs to do. Recommendations   1. POC glucose ACHS  2. Consistent carbohydrate diet (60 grams CHO/meal)  3. Continue correctional humalog at normal sensitivity   4. Start low dose basal insulin: 15 units Lantus once daily  Discharge Plannnig   1. Will need a FUV with PCP within 1-2 weeks after hospital discharge for ongoing diabetes management     2. Given that A1C is low at 6.5% with minimal insulin doses this admission, would adjust Lantus to hospital based doses: 15 units once daily. No additional mealtime insulin at this time. May even benefit from oral antihyperglycemic agents instead of insulin (such as an SGLT2-I with his HF diagnosis) but will defer to PCP for this switch. Billing Code(s)   66044    Before making these care recommendations, I personally reviewed the hospitalization record, including notes, laboratory & diagnostic data and current medications, and examined the patient at the bedside (circumstances permitting) before making care recommendations.      Total minutes: 13    ALEE Arauz  Diabetes Clinical Nurse Specialist  [facility]  Access via [employer]

## 2021-11-03 NOTE — PROGRESS NOTES
Problem: Self Care Deficits Care Plan (Adult)  Goal: *Acute Goals and Plan of Care (Insert Text)  Description: FUNCTIONAL STATUS PRIOR TO ADMISSION: I PTA, no drivers license; has  who drives him to appts, incarcerated x 25 yrs until recently per chart    HOME SUPPORT: lives w/3 others in small home; walk in shower; has \"\" whom he called when he needed assist to hospital 10-2 this year. Occupational Therapy Goals  Initiated 10/30/2021   1. Patient will perform scanning environment with during meals with modified independence within 7 day(s). 2.  Patient will perform simple home management with safe environmental scanning with modified independence within 7 day(s). 3  Patient will participate in upper extremity therapeutic coordination exercise/activities with supervision/set-up within 7 day(s). 4.  Patient will utilize energy conservation, fall prevention techniques during functional activities with verbal cues within 7 day(s). 5.  Patient will improve their Fugl Marlow score by 5 points in prep for ADLs within 7 days. 6.  Patient will verbalize understanding of BE FAST and personal risk factors for CVA in 7 days     Outcome: Not Progressing Towards Goal     OCCUPATIONAL THERAPY TREATMENT  Patient: Afsaneh Mike (74 y.o. male)  Date: 11/3/2021  Diagnosis: Acute CVA (cerebrovascular accident) [facility]) [I63.9]   Acute CVA (cerebrovascular accident) [facility])       Precautions: Fall, Spinal (precautions for back pain; Open heart surgery 2017)  Chart, occupational therapy assessment, plan of care, and goals were reviewed. ASSESSMENT  Patient continues with skilled OT services and is not progressing towards goals. Patient presents with L face decreased to LT (reports acute onset yesterday), severe L homonymous hemianopsia, mild LUE ataxia, and poor L sided obstacle avoidance. Patient presents with L field cut to midline and initiated very little compensation despite education and instruction. Per consultation with the last OT who worked with patient on 11/1, patient's L visual deficits and ability to compensate are worse today. RN and MD notified of increased deficits. He attempted to don his L sock with a sock already on, requiring cuing. He was unable to find a drink on L side of tray despite max cuing, eventually requiring assistance to place drink on R side. While patient ambulated with PT providing assistance, OT approached patient multiple times from L side. Patient did not react at all to this environmental hazard and therapist had to change course to avoid a collision. Given acute neurological deficits and poor compensation, recommend inpatient rehab at d/c. (If patient d/c home instead, he would benefit from [facility] and would require x1 assistance for ADLs, IADLs, and to safely navigate environment. Of concern, he has limited home support.)    Also of concern, patient had an unresponsive episode yesterday, possibly due to hypotension per neurology. Orthostatics taken in today's session (see chart below). Patient reported lightheadedness in standing and need to lay down or the feeling would increase. He reported he had the same feeling yesterday prior to becoming unresponsive. Vitals:    11/03/21 1105 11/03/21 1110 11/03/21 1114 11/03/21 1117   BP: 119/69 94/81 102/73 99/70   BP 1 Location: Right upper arm Right upper arm Right upper arm Left upper arm   BP Patient Position: Supine Sitting Standing Standing  Comment: reporting dizziness, after gait   Pulse: (!) 59 [phone]       Current Level of Function Impacting Discharge (ADLs): up to minimum assistance + verbal cues for ADLs and to ambulate with obstacle avoidance         PLAN :  Patient continues to benefit from skilled intervention to address the above impairments. Continue treatment per established plan of care to address goals.     Recommendation for discharge: (in order for the patient to meet his/her long term goals)   Given acute neurological deficits and poor compensation, recommend inpatient rehab at d/c. (If patient d/c home instead, he would benefit from [facility] and would require x1 assistance for ADLs, IADLs, and to safely navigate environment. Of concern, he has limited home support.)    This discharge recommendation:  Has been made in collaboration with the attending provider and/or case management         SUBJECTIVE:   Patient stated I need to lay down or it's going to get worse.  (regarding lightheadedness)    OBJECTIVE DATA SUMMARY:   Cognitive/Behavioral Status:  Neurologic State: Alert  Orientation Level: Oriented X4  Cognition: Follows commands             Functional Mobility and Transfers for ADLs:  Bed Mobility:  Supine to Sit: Supervision  Sit to Supine: Supervision    Transfers:  Sit to Stand: Stand-by assistance          Balance:  Sitting: Intact  Standing: Impaired  Standing - Static: Constant support; Good  Standing - Dynamic : Constant support; Fair    ADL Intervention:        LB dressing: contact guard assistance. Attempted to don L sock on foot with L sock already donned. Required cuing to correct    Self-feeding: minimum assistance. Unable to locate cup on L side of tray despite max cuing. Requiring assistance to place on R side    Pain:  Patient did not report pain    Activity Tolerance:   signs and symptoms of orthostatic hypotension    After treatment patient left in no apparent distress:   Supine in bed and Call bell within reach    COMMUNICATION/COLLABORATION:   The patients plan of care was discussed with: Physical therapist, Registered nurse, Physician, and Case management. Patient was educated regarding His deficit(s) of mild LUE ataxia and L visual field cut as this relates to His diagnosis of CVA. He demonstrated Good understanding as evidenced by verbal response. Patient and/or family was verbally educated on the BE FAST acronym for signs/symptoms of CVA and TIA.  Provided with CL handout. All questions answered with patient indicating good understanding.      Rosmery Clark, OT  Time Calculation: 23 mins

## 2021-11-03 NOTE — PROGRESS NOTES
Transition of Care Plan: Likely [facility]PR[facility] accepted pending auth     RUR: 12%     PCP F/U: Ryan King NP     Disposition: Likely IPR     Transportation: [facility]     Main Contact: Counselor-Alphonso [phone]     1108: Patient to discharge to a group home/rooming house once medically stable. Will need outpatient PT/OT script at discharge. RN Notified. Confirmed with patient that he is being discharged back to the group home: [address]. States that his counselor Charles Welch will pick him up.     1400: Therapy worked with patient today. Now recommending IPR. MD notified. Will send out referrals. 1600: [facility] accepted. Patient agreeable. Let [facility] know to start [id number] Zac Chapa, RN, CRM    Transition of Care Plan:     The Plan for Transition of Care is related to the following treatment goals: IPR    The Patient and/or patient representative was provided with a choice of provider and agrees  with the discharge plan. Yes [x] No []    A Freedom of choice list was provided with basic dialogue that supports the patient's individualized plan of care/goals and shares the quality data associated with the providers.        Yes [x] No []

## 2021-11-04 LAB
GLUCOSE BLD STRIP.AUTO-MCNC: 152 MG/DL (ref 65–117)
GLUCOSE BLD STRIP.AUTO-MCNC: 176 MG/DL (ref 65–117)
GLUCOSE BLD STRIP.AUTO-MCNC: 183 MG/DL (ref 65–117)
GLUCOSE BLD STRIP.AUTO-MCNC: 185 MG/DL (ref 65–117)
SARS-COV-2, COV2: NORMAL
SERVICE CMNT-IMP: ABNORMAL

## 2021-11-04 PROCEDURE — 82962 GLUCOSE BLOOD TEST: CPT

## 2021-11-04 PROCEDURE — 74011000258 HC RX REV CODE- 258: Performed by: INTERNAL MEDICINE

## 2021-11-04 PROCEDURE — 65660000000 HC RM CCU STEPDOWN

## 2021-11-04 PROCEDURE — 99231 SBSQ HOSP IP/OBS SF/LOW 25: CPT | Performed by: CLINICAL NURSE SPECIALIST

## 2021-11-04 PROCEDURE — 74011250636 HC RX REV CODE- 250/636: Performed by: INTERNAL MEDICINE

## 2021-11-04 PROCEDURE — U0005 INFEC AGEN DETEC AMPLI PROBE: HCPCS

## 2021-11-04 PROCEDURE — 74011636637 HC RX REV CODE- 636/637: Performed by: INTERNAL MEDICINE

## 2021-11-04 PROCEDURE — 74011250637 HC RX REV CODE- 250/637: Performed by: INTERNAL MEDICINE

## 2021-11-04 RX ADMIN — METHOCARBAMOL 750 MG: 750 TABLET ORAL at 09:01

## 2021-11-04 RX ADMIN — ATORVASTATIN CALCIUM 80 MG: 40 TABLET, FILM COATED ORAL at 08:57

## 2021-11-04 RX ADMIN — TAMSULOSIN HYDROCHLORIDE 0.4 MG: 0.4 CAPSULE ORAL at 08:58

## 2021-11-04 RX ADMIN — METHOCARBAMOL 750 MG: 750 TABLET ORAL at 17:08

## 2021-11-04 RX ADMIN — LEVETIRACETAM 500 MG: 100 INJECTION, SOLUTION, CONCENTRATE INTRAVENOUS at 22:27

## 2021-11-04 RX ADMIN — AMITRIPTYLINE HYDROCHLORIDE 25 MG: 25 TABLET, FILM COATED ORAL at 22:17

## 2021-11-04 RX ADMIN — PANTOPRAZOLE SODIUM 40 MG: 40 TABLET, DELAYED RELEASE ORAL at 07:26

## 2021-11-04 RX ADMIN — SPIRONOLACTONE 25 MG: 25 TABLET ORAL at 08:57

## 2021-11-04 RX ADMIN — APIXABAN 5 MG: 5 TABLET, FILM COATED ORAL at 08:57

## 2021-11-04 RX ADMIN — FUROSEMIDE 40 MG: 40 TABLET ORAL at 09:01

## 2021-11-04 RX ADMIN — INSULIN LISPRO 2 UNITS: 100 INJECTION, SOLUTION INTRAVENOUS; SUBCUTANEOUS at 08:58

## 2021-11-04 RX ADMIN — CARVEDILOL 6.25 MG: 3.12 TABLET, FILM COATED ORAL at 08:57

## 2021-11-04 RX ADMIN — INSULIN LISPRO 2 UNITS: 100 INJECTION, SOLUTION INTRAVENOUS; SUBCUTANEOUS at 17:08

## 2021-11-04 RX ADMIN — LISINOPRIL 10 MG: 10 TABLET ORAL at 08:57

## 2021-11-04 RX ADMIN — LEVETIRACETAM 500 MG: 100 INJECTION, SOLUTION, CONCENTRATE INTRAVENOUS at 10:00

## 2021-11-04 RX ADMIN — APIXABAN 5 MG: 5 TABLET, FILM COATED ORAL at 22:17

## 2021-11-04 RX ADMIN — INSULIN LISPRO 2 UNITS: 100 INJECTION, SOLUTION INTRAVENOUS; SUBCUTANEOUS at 12:26

## 2021-11-04 RX ADMIN — CARVEDILOL 6.25 MG: 3.12 TABLET, FILM COATED ORAL at 17:08

## 2021-11-04 RX ADMIN — METHOCARBAMOL 750 MG: 750 TABLET ORAL at 22:17

## 2021-11-04 RX ADMIN — ASPIRIN 81 MG CHEWABLE TABLET 81 MG: 81 TABLET CHEWABLE at 09:00

## 2021-11-04 NOTE — PROGRESS NOTES
Bedside and Verbal shift change report given to [address] (oncoming nurse) by Ruby Rojas (offgoing nurse). Report included the following information SBAR, Kardex, Intake/Output, MAR, Recent Results, Cardiac Rhythm NSR and Dual Neuro Assessment.

## 2021-11-04 NOTE — PROGRESS NOTES
I have read and agree with the documentation of Mari Ferrari, as her preceptor. Bedside and Verbal shift change report given to [address] (oncoming nurse) by Destini Fernandez RN (offgoing nurse). Report included the following information SBAR, Kardex, Intake/Output, MAR, Recent Results, Cardiac Rhythm NSR-SB and Dual Neuro Assessment.

## 2021-11-04 NOTE — PROGRESS NOTES
Hospitalist Progress Note        Date of Service:  2021  NAME:  Derek Wyatt  :  1950  MRN:  [id number]    Admission Summary:   71M w/ CAD s/p CABG, HTN, CHF, DM, dyslipidemia, DVT on Eliquis, COPD, who presented with weakness and was found to have an acute occipital infarcts. Interval history / Subjective:   Offers no complaints, no pain, dyspnea, n/v/d     Assessment & Plan:     #. Acute CVAs:   - CT head: foci of acute/subacute infarcts in R- occipital area. CTA head/neck: No sig stenosis  - , A1c 7.9. Statin 'high intensity', aspirin.   - MRI: R PCA territory likely subacute infarcts, occluded R PCA- likely atherothrombotic- evaluated by [facility] who didn't think it needs any intervention and likely old. - TTE with bubble pending.  - Neuro following, needs evaluation by ophthalmology, they can't bring equipment into hospital, hence requested he follows up op closely with them. - restarted home meds, pt not been compliant. PT/OT- worsening performance, needs rehab    #. Orthostatic hypotension: dropped by 20 points, and felt dizzy. - was initially Suspect Seizure: pt had 3 episodes of unresponsiveness, gazing, losing tone. No clear convulsion.  - repeat CT head: NAD. PRN ativan iv, EEG: slowing on R side,   - re-consult to neurology- Keppra for 1 month. #. CAD: s/p CABG. stable, home regimen, monitor  #. Diastolic CHF: stable, continue home meds/diuretics. #. HTN: chronic, stable, Home regimen, PRN BP meds. Monitor  #. HLD: chronic, Stable, Home regimen  #. DM2: A1c 7.9, SSI, AccuChecks, monitor  #. COPD: no exacerbation, continue home regimen, DuoNebs PRN. #. Chronic nicotine dependence: counseled on health risks of nicotine. Nicotine patch daily  #. smokes marijuana. UDS +ve. Pt counseled on harmful effects. Encouraged to quit. Monitor   #. PEs: chronic- continue Eliquis.     Code status: Full  DVT prophylaxis: Eliquis  Care Plan discussed with: Patient/Family and Nurse  Disposition: awaiting rehab [facility] Problems  Date Reviewed: 10/29/2021          Codes Class Noted POA    * (Principal) Acute CVA (cerebrovascular accident) [facility]) ICD-10-CM: I63.9  ICD-9-CM: 434.91  10/29/2021 Yes        Near syncope ICD-10-CM: R55  ICD-9-CM: 780.2  1/2/2019 Unknown            Review of Systems:   Pertinent items are mentioned in interval history. Vital Signs:    Last 24hrs VS reviewed since prior progress note. Most recent are:  Visit Vitals  /72 (BP 1 Location: Right upper arm, BP Patient Position: At rest)   Pulse 67   Temp 97.6 °F (36.4 °C)   Resp 15   Ht 6' (1.829 m)   Wt 101.1 kg (222 lb 14.2 oz)   SpO2 97%   BMI 30.23 kg/m²         Intake/Output Summary (Last 24 hours) at 11/4/2021 1141  Last data filed at 11/3/2021 2234  Gross per 24 hour   Intake --   Output 425 ml   Net -425 ml        Physical Examination:   Evaluated face to face and examined 11/04/21    General:  Alert, partially oriented, No acute distress. Elderly BM. Card:  S1, S2, No murmurs, good peripheral perfusion, warm peripheries  Resp:  No accessory muscle use, Good AE, no wheezes. no crepitations  Abd:  Soft, non-tender, non-distended, BS+  Extremities:  No cyanosis or clubbing, no significant edema  Neuro:  alert, partially oriented, follows commands, speech understood, LUE weaker than R 3/5. Psych:  fair/poor insight, not agitated. Data Review:    Review and/or order of clinical lab test  Review and/or order of tests in the radiology section of CPT  Review and/or order of tests in the medicine section of CPT  Labs:     No results for input(s): WBC, HGB, HCT, PLT, HGBEXT, HCTEXT, PLTEXT, HGBEXT, HCTEXT, PLTEXT in the last 72 hours. No results for input(s): NA, K, CL, CO2, BUN, CREA, GLU, CA, MG, PHOS, URICA in the last 72 hours. No results for input(s): ALT, AP, TBIL, TBILI, TP, ALB, GLOB, GGT, AML, LPSE in the last 72 hours.     No lab exists for component: SGOT, GPT, AMYP, HLPSE  No results for input(s): INR, PTP, APTT, INREXT, INREXT in the last 72 hours. No results for input(s): FE, TIBC, PSAT, FERR in the last 72 hours. Lab Results   Component Value Date/Time    Folate 12.8 10/30/2021 07:18 AM      No results for input(s): PH, PCO2, PO2 in the last 72 hours. No results for input(s): CPK, CKNDX, TROIQ in the last 72 hours.     No lab exists for component: CPKMB  Lab Results   Component Value Date/Time    Cholesterol, total 202 (H) 10/30/2021 07:18 AM    HDL Cholesterol 43 10/30/2021 07:18 AM    LDL, calculated 134.8 (H) 10/30/2021 07:18 AM    Triglyceride 121 10/30/2021 07:18 AM    CHOL/HDL Ratio 4.7 10/30/2021 07:18 AM     Lab Results   Component Value Date/Time    Glucose (POC) 152 (H) 11/04/2021 07:28 AM    Glucose (POC) 223 (H) 11/03/2021 09:58 PM    Glucose (POC) 116 11/03/2021 04:32 PM    Glucose (POC) 176 (H) 11/03/2021 11:50 AM    Glucose (POC) 161 (H) 11/03/2021 08:08 AM     Lab Results   Component Value Date/Time    Color DARK YELLOW 10/29/2021 03:23 PM    Appearance CLEAR 10/29/2021 03:23 PM    Specific gravity 1.030 10/29/2021 03:23 PM    Specific gravity 1.015 01/02/2019 03:45 PM    pH (UA) 5.5 10/29/2021 03:23 PM    Protein Negative 10/29/2021 03:23 PM    Glucose Negative 10/29/2021 03:23 PM    Ketone TRACE (A) 10/29/2021 03:23 PM    Bilirubin Negative 10/29/2021 03:23 PM    Urobilinogen 1.0 10/29/2021 03:23 PM    Nitrites Negative 10/29/2021 03:23 PM    Leukocyte Esterase SMALL (A) 10/29/2021 03:23 PM    Epithelial cells FEW 10/29/2021 03:23 PM    Bacteria Negative 10/29/2021 03:23 PM    WBC 10-20 10/29/2021 03:23 PM    RBC 0-5 10/29/2021 03:23 PM     Medications Reviewed:     Current Facility-Administered Medications   Medication Dose Route Frequency    LORazepam (ATIVAN) injection 1 mg  1 mg IntraVENous Q2H PRN    levETIRAcetam (KEPPRA) 500 mg in 0.9% sodium chloride 100 mL IVPB  500 mg IntraVENous Q12H    carvediloL (COREG) tablet 6.25 mg  6.25 mg Oral BID WITH MEALS    apixaban (ELIQUIS) tablet 5 mg  5 mg Oral Q12H    oxyCODONE IR (ROXICODONE) tablet 5 mg  5 mg Oral Q4H PRN    albuterol-ipratropium (DUO-NEB) 2.5 MG-0.5 MG/3 ML  3 mL Nebulization Q6H PRN    amitriptyline (ELAVIL) tablet 25 mg  25 mg Oral QHS    aspirin chewable tablet 81 mg  81 mg Oral DAILY    atorvastatin (LIPITOR) tablet 80 mg  80 mg Oral DAILY    furosemide (LASIX) tablet 40 mg  40 mg Oral DAILY    lisinopriL (PRINIVIL, ZESTRIL) tablet 10 mg  10 mg Oral DAILY    nicotine (NICODERM CQ) 21 mg/24 hr patch 1 Patch  1 Patch TransDERmal Q24H    pantoprazole (PROTONIX) tablet 40 mg  40 mg Oral ACB    spironolactone (ALDACTONE) tablet 25 mg  25 mg Oral DAILY    tamsulosin (FLOMAX) capsule 0.4 mg  0.4 mg Oral DAILY    methocarbamoL (ROBAXIN) tablet 750 mg  750 mg Oral TID    acetaminophen (TYLENOL) tablet 650 mg  650 mg Oral Q4H PRN    Or    acetaminophen (TYLENOL) solution 650 mg  650 mg Per NG tube Q4H PRN    Or    acetaminophen (TYLENOL) suppository 650 mg  650 mg Rectal Q4H PRN    ondansetron (ZOFRAN) injection 4 mg  4 mg IntraVENous Q6H PRN    bisacodyL (DULCOLAX) tablet 5 mg  5 mg Oral DAILY PRN    insulin lispro (HUMALOG) injection   SubCUTAneous AC&HS    glucose chewable tablet 16 g  4 Tablet Oral PRN    dextrose (D50W) injection syrg 12.5-25 g  12.5-25 g IntraVENous PRN    glucagon (GLUCAGEN) injection 1 mg  1 mg IntraMUSCular PRN   ______________________________________________________________________  EXPECTED LENGTH OF STAY: 4d 9h  ACTUAL LENGTH OF STAY:          6               Suzanna Ruiz MD

## 2021-11-04 NOTE — PROGRESS NOTES
Transition of Care Plan: Likely [facility] accepted-auth initiated 11/04     RUR: 12%     PCP F/U: Cha Calhoun NP     Disposition: Likely [facility]-FF[facility]-jrti initiated 11/04     Transportation: counselor-Alphonso Ibrahim     Main Contact: Counselor-Alphonso [phone]     0954: Message from [facility] that they have started auth today. Will continue to follow.      Carlitos Singh RN, CRM

## 2021-11-04 NOTE — PROGRESS NOTES
Problem: Falls - Risk of  Goal: *Absence of Falls  Description: Document Merlene King Fall Risk and appropriate interventions in the flowsheet.   Outcome: Progressing Towards Goal  Note: Fall Risk Interventions:  Mobility Interventions: Bed/chair exit alarm, Communicate number of staff needed for ambulation/transfer, Patient to call before getting OOB         Medication Interventions: Patient to call before getting OOB    Elimination Interventions: Call light in reach, Urinal in reach              Problem: TIA/CVA Stroke: Day 2 Until Discharge  Goal: Diagnostic Test/Procedures  Outcome: Progressing Towards Goal  Goal: Nutrition/Diet  Outcome: Progressing Towards Goal  Goal: Discharge Planning  Outcome: Progressing Towards Goal  Goal: Medications  Outcome: Progressing Towards Goal  Goal: Respiratory  Outcome: Progressing Towards Goal

## 2021-11-04 NOTE — DIABETES MGMT
[facility]    CLINICAL NURSE SPECIALIST CONSULT     FOLLOW UP  NOTE    Initial Presentation   Afsaneh Mike is a 80 y.o. male admitted 10/29/21 with c/o generalized weakness and SOB. CT of head showed acute CVA. HX:   Past Medical History:   Diagnosis Date    CAD (coronary artery disease)     Diabetes ([facility] [address]     GERD (gastroesophageal reflux disease)     Heart failure (HCC)     Hypertension         INITIAL DX: Acute CVA (cerebrovascular accident) ([facility] [address] [I63.9]    Current Treatment     TX: MRI-1. Late acute versus subacute infarcts along the right PCA territory involve the  basal ganglia, thalamus, corpus callosum, and occipital lobe. Evidence of  associated petechial hemorrhage. 2. Extensive chronic microvascular ischemic disease  / ECHO-pending/ neurology consult/ B12-WNL/ TSH- WNL/    Consulted by Nursing for advanced diabetes nursing assessment and care for:   [x] Home management assessment      Hospital Course   Clinical progress has been complicated by acute CVA in setting of CAD/ and  Controlled DM2. .      11/2/21: noted seizure activity today, 3 episodes of unresponsiveness/gazing/losing tone. STAT EEG completed. Repeat CT scan completed-evolving nonhemorrhagic right occipital infarction  Diabetes History   DM2- A1C in goal- 6.5%. PCP-Cha Calhoun-last communication in Sept 2020 when Lantus dose changed to ONCE daily. She attempted to call him and notify him of this but was unable to reach him. Notes say he needed to make a follow up appt.      Diabetes-related Medical History  Acute complications  NONE  Neurological complications  Peripheral neuropathy  Microvascular disease  NONE  Macrovascular disease  CAD and Cerebral vascular accident  Other associated conditions     HTN/ HF    Diabetes Medication History  Drug class Currently in use Discontinued Never used   Biguanide  Metformin-I stopped b/c I had diarrhea    DDP-4 inhibitor       Sulfonylurea Thiazolidinedione      GLP-1 RA      SGLT-2 inhibitors      Basal insulin Lantus 30 units daily   (states he gives himself 30 units BID, instead of once daily)     Bolus insulin Regular insulin 10 units twice daily     Fixed Dose  Combinations        Subjective   Patient found resting in bed    Has reported frequent low blood sugars lately, likely due to too much insulin   Has been giving him self 30 units Lantus in AM and PM (when prescribed as once daily)    Patient lives with roommates in a house, no family or children      Patient reports the following home diabetes self-management practices:   Eating pattern-poor dentition (3 teeth left)   [x] Eating a carbohydrate-controlled mealplan  [x] Breakfast Corn beef hash/ boiled egg/ coffee   Lunch  Fried fish sandwich/FF or chips/ regular ginger ale  [x] Dinner  \"Whatever my roommates cook\"  [x] Snacks Loves soft sweets: honey bun/ cakes/ ice cream  [x] Beverages Water/ sodas  Physical activity pattern - mostly sedentary     Monitoring pattern- checks BG twice daily AFTER meals -mostly high >150. Taking medications pattern  [x] Consistent administration  [x] Affordable    Social determinants of health impacting diabetes self-management practices   Concerned that you need to know more about how to stay healthy with diabetes    Overall evaluation:    [x] Achieving A1c target with drug therapy & self-care practices     Objective   Physical exam  General Over weight male in no acute distress. Conversant and cooperative  Neuro  Alert, oriented   Vital Signs   Visit Vitals  /72 (BP 1 Location: Right upper arm, BP Patient Position: At rest)   Pulse 73   Temp 97.6 °F (36.4 °C)   Resp 15   Ht 6' (1.829 m)   Wt 101.1 kg (222 lb 14.2 oz)   SpO2 97%   BMI 30.23 kg/m²     Skin  Warm and dry. Heart   Regular rate and rhythm.  No murmurs, rubs or gallops  Lungs  Clear to auscultation without rales or rhonchi  Extremities No foot wounds    Diabetic foot exam:    Left Foot     Visual Exam: normal    Pulse DP: 2+ (normal)   Filament test: reduced sensation -bottom of toes     Right Foot   Visual Exam: normal    Pulse DP: 2+ (normal)   Filament test: reduced sensation -bottom of toes    DP & PT pulses +2. Laboratory  Tests 10/30   A1c 6.5%      Anion gap -   Serum triglycerides 121   WBC -   Serum creatinine 0.68   GFR >60   AST WNL   ALT WNL   cholesterol 202      B-12 361   TSH WNL     No results found for this visit on 10/29/21 (from the past 12 hour(s)). No results found for this visit on 10/29/21 (from the past 12 hour(s)). No results found for this visit on 10/29/21 (from the past 6 hour(s)). Factors impacting BG management  Factor Dose Comments   Nutrition:  Standard meals     60 grams/meal      Other:   Kidney function  Liver function     WNL  WNL      Blood glucose pattern        Assessment and Plan   Nursing Diagnosis Risk for unstable blood glucose pattern   Nursing Intervention Domain 5250 Decision-making Support   Nursing Interventions Examined current inpatient diabetes control   Explored factors facilitating and impeding inpatient management  Identified self-management practices impeding diabetes control  Explored corrective strategies with patient and responsible inpatient provider   Informed patient of rational for insulin strategy while hospitalized  Instructed patient in :  Check BG before meals   Taking Lantus once a day     Evaluation   This AA male, with controlled Type 2 diabetes is currently admitted for acute CVA. Re: diabetes management, he reports taking his Lantus twice daily (with breakfast and dinner) and the regular insulin 10 units twice daily. His Lantus dose was changed to once daily back in Sept. But MD was unable to reach him by phone to communicate this to him. He also reports frequent low blood sugars states \"the lows are terrible\". He monitors his blood glucose once to twice daily but checks AFTER meals.   Discussed his diet - consumes sweets, regular sodas, frequently. He reports he sometimes \"forgets\" if he gave himself his insulin and will dose himself again. Discussed how dangerous it is for him to do this. On admission he also had low BG. Likely this is due to high basal insulin doses at home. BG trends since hospitalized have been in target without lows. Has only needed 2 units of correctional insulin since admitted on 10/29/21. His diet at home is driving his elevated blood sugars and increased insulin needs. He seems easily confused with his regimen - Will attempt to simplify his and write down what he needs to do. Likely that his diet PTA was driving his increased insulin doses. While inpatient, insulin needs have been very low and patient's BG trends consistently <200. PO intake ? Discharge disposition is rehab. Would continue with hospital based insulin (correctional only) at discharge with low dose Lantus at recommended dose below. Recommendations   1. POC glucose ACHS  2. Consistent carbohydrate diet (60 grams CHO/meal)  3. Continue correctional humalog at normal sensitivity   4. Start low dose basal insulin: 15 units Lantus once daily  Discharge Plannnig   1. Will need a FUV with PCP within 1-2 weeks after hospital discharge for ongoing diabetes management - note forwarded, will re-forward so PCP will see discharge dispo. 2. Given that A1C is low at 6.5% with minimal insulin doses this admission, would adjust Lantus to hospital based doses: 15 units once daily. No additional mealtime insulin at this time. May even benefit from oral antihyperglycemic agents instead of insulin (such as an SGLT2-I with his HF diagnosis) but will defer to PCP for this switch.    Billing Code(s)   73782    Before making these care recommendations, I personally reviewed the hospitalization record, including notes, laboratory & diagnostic data and current medications, and examined the patient at the bedside (circumstances permitting) before making care recommendations.      Total minutes: [address] [place] ALEE Hermosillo  Diabetes Clinical Nurse Specialist  [facility]  Access via [address] [place] [address]

## 2021-11-05 LAB
ANION GAP SERPL CALC-SCNC: 2 MMOL/L (ref 5–15)
ATRIAL RATE: 68 BPM
BUN SERPL-MCNC: 10 MG/DL (ref 6–20)
BUN/CREAT SERPL: 11 (ref 12–20)
CALCIUM SERPL-MCNC: 9.5 MG/DL (ref 8.5–10.1)
CALCULATED P AXIS, ECG09: 55 DEGREES
CALCULATED R AXIS, ECG10: -75 DEGREES
CALCULATED T AXIS, ECG11: 9 DEGREES
CHLORIDE SERPL-SCNC: 103 MMOL/L (ref 97–108)
CO2 SERPL-SCNC: 32 MMOL/L (ref 21–32)
CREAT SERPL-MCNC: 0.9 MG/DL (ref 0.7–1.3)
DIAGNOSIS, 93000: NORMAL
ERYTHROCYTE [DISTWIDTH] IN BLOOD BY AUTOMATED COUNT: 12.9 % (ref 11.5–14.5)
GLUCOSE BLD STRIP.AUTO-MCNC: 149 MG/DL (ref 65–117)
GLUCOSE BLD STRIP.AUTO-MCNC: 170 MG/DL (ref 65–117)
GLUCOSE BLD STRIP.AUTO-MCNC: 198 MG/DL (ref 65–117)
GLUCOSE BLD STRIP.AUTO-MCNC: 214 MG/DL (ref 65–117)
GLUCOSE SERPL-MCNC: 160 MG/DL (ref 65–100)
HCT VFR BLD AUTO: 48.8 % (ref 36.6–50.3)
HGB BLD-MCNC: 16.6 G/DL (ref 12.1–17)
MAGNESIUM SERPL-MCNC: 1.8 MG/DL (ref 1.6–2.4)
MCH RBC QN AUTO: 31 PG (ref 26–34)
MCHC RBC AUTO-ENTMCNC: 34 G/DL (ref 30–36.5)
MCV RBC AUTO: 91.2 FL (ref 80–99)
NRBC # BLD: 0 K/UL (ref 0–0.01)
NRBC BLD-RTO: 0 PER 100 WBC
P-R INTERVAL, ECG05: 170 MS
PLATELET # BLD AUTO: 198 K/UL (ref 150–400)
PMV BLD AUTO: 10.5 FL (ref 8.9–12.9)
POTASSIUM SERPL-SCNC: 3.8 MMOL/L (ref 3.5–5.1)
Q-T INTERVAL, ECG07: 420 MS
QRS DURATION, ECG06: 142 MS
QTC CALCULATION (BEZET), ECG08: 446 MS
RBC # BLD AUTO: 5.35 M/UL (ref 4.1–5.7)
SARS-COV-2, XPLCVT: NOT DETECTED
SERVICE CMNT-IMP: ABNORMAL
SODIUM SERPL-SCNC: 137 MMOL/L (ref 136–145)
SOURCE, COVRS: NORMAL
VENTRICULAR RATE, ECG03: 68 BPM
WBC # BLD AUTO: 7.5 K/UL (ref 4.1–11.1)

## 2021-11-05 PROCEDURE — 97530 THERAPEUTIC ACTIVITIES: CPT

## 2021-11-05 PROCEDURE — 74011250637 HC RX REV CODE- 250/637: Performed by: INTERNAL MEDICINE

## 2021-11-05 PROCEDURE — 83735 ASSAY OF MAGNESIUM: CPT

## 2021-11-05 PROCEDURE — 93005 ELECTROCARDIOGRAM TRACING: CPT

## 2021-11-05 PROCEDURE — 74011636637 HC RX REV CODE- 636/637: Performed by: INTERNAL MEDICINE

## 2021-11-05 PROCEDURE — 85027 COMPLETE CBC AUTOMATED: CPT

## 2021-11-05 PROCEDURE — 65660000000 HC RM CCU STEPDOWN

## 2021-11-05 PROCEDURE — 74011000258 HC RX REV CODE- 258: Performed by: INTERNAL MEDICINE

## 2021-11-05 PROCEDURE — 74011250636 HC RX REV CODE- 250/636: Performed by: INTERNAL MEDICINE

## 2021-11-05 PROCEDURE — 82962 GLUCOSE BLOOD TEST: CPT

## 2021-11-05 PROCEDURE — 97116 GAIT TRAINING THERAPY: CPT

## 2021-11-05 PROCEDURE — 36415 COLL VENOUS BLD VENIPUNCTURE: CPT

## 2021-11-05 PROCEDURE — 80048 BASIC METABOLIC PNL TOTAL CA: CPT

## 2021-11-05 PROCEDURE — 97535 SELF CARE MNGMENT TRAINING: CPT

## 2021-11-05 PROCEDURE — 97112 NEUROMUSCULAR REEDUCATION: CPT

## 2021-11-05 RX ADMIN — APIXABAN 5 MG: 5 TABLET, FILM COATED ORAL at 09:15

## 2021-11-05 RX ADMIN — AMITRIPTYLINE HYDROCHLORIDE 25 MG: 25 TABLET, FILM COATED ORAL at 21:25

## 2021-11-05 RX ADMIN — LEVETIRACETAM 500 MG: 100 INJECTION, SOLUTION, CONCENTRATE INTRAVENOUS at 14:37

## 2021-11-05 RX ADMIN — CARVEDILOL 6.25 MG: 3.12 TABLET, FILM COATED ORAL at 09:15

## 2021-11-05 RX ADMIN — ATORVASTATIN CALCIUM 80 MG: 40 TABLET, FILM COATED ORAL at 09:15

## 2021-11-05 RX ADMIN — FUROSEMIDE 40 MG: 40 TABLET ORAL at 09:15

## 2021-11-05 RX ADMIN — METHOCARBAMOL 750 MG: 750 TABLET ORAL at 17:04

## 2021-11-05 RX ADMIN — ASPIRIN 81 MG CHEWABLE TABLET 81 MG: 81 TABLET CHEWABLE at 09:14

## 2021-11-05 RX ADMIN — TAMSULOSIN HYDROCHLORIDE 0.4 MG: 0.4 CAPSULE ORAL at 09:15

## 2021-11-05 RX ADMIN — INSULIN LISPRO 2 UNITS: 100 INJECTION, SOLUTION INTRAVENOUS; SUBCUTANEOUS at 09:14

## 2021-11-05 RX ADMIN — SPIRONOLACTONE 25 MG: 25 TABLET ORAL at 09:15

## 2021-11-05 RX ADMIN — METHOCARBAMOL 750 MG: 750 TABLET ORAL at 21:25

## 2021-11-05 RX ADMIN — CARVEDILOL 6.25 MG: 3.12 TABLET, FILM COATED ORAL at 17:04

## 2021-11-05 RX ADMIN — METHOCARBAMOL 750 MG: 750 TABLET ORAL at 09:15

## 2021-11-05 RX ADMIN — INSULIN LISPRO 2 UNITS: 100 INJECTION, SOLUTION INTRAVENOUS; SUBCUTANEOUS at 12:30

## 2021-11-05 RX ADMIN — INSULIN LISPRO 3 UNITS: 100 INJECTION, SOLUTION INTRAVENOUS; SUBCUTANEOUS at 17:03

## 2021-11-05 RX ADMIN — LISINOPRIL 10 MG: 10 TABLET ORAL at 09:15

## 2021-11-05 RX ADMIN — PANTOPRAZOLE SODIUM 40 MG: 40 TABLET, DELAYED RELEASE ORAL at 07:24

## 2021-11-05 RX ADMIN — APIXABAN 5 MG: 5 TABLET, FILM COATED ORAL at 21:25

## 2021-11-05 NOTE — PROGRESS NOTES
Problem: Mobility Impaired (Adult and Pediatric)  Goal: *Acute Goals and Plan of Care (Insert Text)  Description: FUNCTIONAL STATUS PRIOR TO ADMISSION: Patient was independent and active without use of DME.    HOME SUPPORT PRIOR TO ADMISSION: The patient lived with three roommates. Questionable living situation? Physical Therapy Goals  Reassessed and remain appropriate 11/5/2021    Initiated 10/30/2021  1. Patient will move from supine to sit and sit to supine  in bed with modified independence within 7 day(s). 2.  Patient will transfer from bed to chair and chair to bed with modified independence using the least restrictive device within 7 day(s). 3.  Patient will perform sit to stand with modified independence within 7 day(s). 4.  Patient will ambulate with modified independence for 150 feet with the least restrictive device within 7 day(s). 5.  Patient will ascend/descend 12 stairs with 1 handrail(s) with modified independence within 7 day(s). Outcome: Progressing Towards Goal   PHYSICAL THERAPY TREATMENT: WEEKLY REASSESSMENT  Patient: Paxton Juarez (34 y.o. male)  Date: 11/5/2021  Primary Diagnosis: Acute CVA (cerebrovascular accident) [facility]) [I63.9]        Precautions:   Fall, Spinal (precautions for back pain; Open heart surgery 2017)      ASSESSMENT  Patient continues with skilled PT services and is progressing towards goals. Pt received supine inbed and agreeable to therapy. Pt tolerated session well, but continues to be limited by impaired vision and inattention on the L, impaired coordination LUE, decreased functional mobility, impaired balance and gait, decreased safety awareness, decreased insight into deficits, and decreased ability to compensate for impairments. Session focused on improving patient's ability to attend to L side of environment during ambulation.  Pt required up to max verbal cues to locate objects placed throughout the hallway (purple sticky notes) and to identify room numbers as well as fire extinguisher on the L. Pt required manual assist and max verbal cues to turn his head to the L and even turn his entire body to the L to locate objects. Pt was able to avoid obstacles placed in the hallway, but did have a near miss with an obstacle placed on the L. Pt remains at increased risk for falls and injury and will  benefit from continued therapy services to maximize functional independence. Recommend inpatient rehab upon discharge    Patient's progression toward goals since last assessment: progress has been made towards all goals, but none were met    Current Level of Function Impacting Discharge (mobility/balance): min A gait training for safety and awareness of L side of environment      Other factors to consider for discharge: previously independent         PLAN :  Goals have been updated based on progression since last assessment. Patient continues to benefit from skilled intervention to address the above impairments. Recommendations and Planned Interventions: bed mobility training, transfer training, gait training, therapeutic exercises, neuromuscular re-education, patient and family training/education, and therapeutic activities      Frequency/Duration: Patient will be followed by physical therapy:  5 times a week to address goals.     Recommendation for discharge: (in order for the patient to meet his/her long term goals)  Therapy 3 hours per day 5-7 days per week    This discharge recommendation:  Has been made in collaboration with the attending provider and/or case management    IF patient discharges home will need the following DME: to be determined (TBD)         SUBJECTIVE:   Patient stated I didn't get dizzy that time    OBJECTIVE DATA SUMMARY:   HISTORY:    Past Medical History:   Diagnosis Date    CAD (coronary artery disease)     Diabetes ([facility] [address]     GERD (gastroesophageal reflux disease)     Heart failure ([facility] [address]     Hypertension      Past Surgical History: Procedure Laterality Date    HX CORONARY ARTERY BYPASS GRAFT      4 way       Personal factors and/or comorbidities impacting plan of care:     Home Situation  Home Environment: Private residence  # Steps to Enter: 3  Rails to Enter: Yes  Wheelchair Ramp: No  One/Two Story Residence: Two story  Living Alone: No  Support Systems: Other (Comment) ((Counselor) Suzanne Pastor- [phone])  Patient Expects to be Discharged to[de-identified] House  Current DME Used/Available at Home: Walker, rolling, Cane, straight (fearful he can't use in home due to small spaces)  Tub or Shower Type: Shower    EXAMINATION/PRESENTATION/DECISION MAKING:   Critical Behavior:  Neurologic State: Alert  Orientation Level: Oriented X4  Cognition: Appropriate safety awareness, Follows commands (periodic paranoia)  Safety/Judgement: Decreased insight into deficits, Decreased awareness of need for safety (receptive to learning about visual deficits)  Hearing: Auditory  Auditory Impairment: None  Skin:    Edema:   Range Of Motion:  AROM: Within functional limits                       Strength:    Strength:  Within functional limits                    Tone & Sensation:   Tone: Normal              Sensation: Intact               Coordination:  Coordination: Generally decreased, functional (LUE impaired)  Vision:      Functional Mobility:  Bed Mobility:     Supine to Sit: Supervision     Scooting: Supervision  Transfers:  Sit to Stand: Stand-by assistance  Stand to Sit: Stand-by assistance                       Balance:   Sitting: Intact  Standing: Impaired  Standing - Static: Good  Standing - Dynamic : Fair  Ambulation/Gait Training:  Distance (ft): 50 Feet (ft)  Assistive Device: Gait belt  Ambulation - Level of Assistance: Contact guard assistance (max verbal cues to attend to the L side)                 Base of Support: Widened     Speed/Kina: Slow  Step Length: Left shortened; Right shortened           Therapeutic Exercises:   Gait training with object identification and localization with emphasis on L side awareness of the environment    Functional Measure:        Pain Rating:pt denied pain      Activity Tolerance:   Good    After treatment patient left in no apparent distress:   Sitting in chair, Call bell within reach, Bed / chair alarm activated, and Side rails x 3    COMMUNICATION/EDUCATION:   The patients plan of care was discussed with: Occupational therapist and Registered nurse. Fall prevention education was provided and the patient/caregiver indicated understanding., Patient/family have participated as able in goal setting and plan of care. , and Patient/family agree to work toward stated goals and plan of care.     Thank you for this referral.  Divya Chapa, PT, DPT   Time Calculation: 23 mins

## 2021-11-05 NOTE — PROGRESS NOTES
Problem: Self Care Deficits Care Plan (Adult)  Goal: *Acute Goals and Plan of Care (Insert Text)  Description: FUNCTIONAL STATUS PRIOR TO ADMISSION: I PTA, no drivers license; has  who drives him to appts, incarcerated x 25 yrs until recently per chart    HOME SUPPORT: lives w/3 others in small home; walk in shower; has \"\" whom he called when he needed assist to hospital 10-2 this year. Occupational Therapy Goals  Initiated 10/30/2021   1. Patient will perform scanning environment with during meals with modified independence within 7 day(s). 2.  Patient will perform simple home management with safe environmental scanning with modified independence within 7 day(s). 3  Patient will participate in upper extremity therapeutic coordination exercise/activities with supervision/set-up within 7 day(s). 4.  Patient will utilize energy conservation, fall prevention techniques during functional activities with verbal cues within 7 day(s). 5.  Patient will improve their Fugl Marlow score by 5 points in prep for ADLs within 7 days. 6.  Patient will verbalize understanding of BE FAST and personal risk factors for CVA in 7 days       Outcome: Progressing Towards Goal    OCCUPATIONAL THERAPY TREATMENT  Patient: Tana James (57 y.o. male)  Date: 11/5/2021  Diagnosis: Acute CVA (cerebrovascular accident) [facility]) [I63.9]   Acute CVA (cerebrovascular accident) [facility])       Precautions: Fall, Spinal (precautions for back pain; Open heart surgery 2017)  Chart, occupational therapy assessment, plan of care, and goals were reviewed. ASSESSMENT  Patient is progressing in OT goals but remains limited by L homonymous hemianopsia and mild standing balance impairment. Patient continues to require significant cuing to compensate for L visual field cut, although improving. While mobilizing in the bathroom, patient successfully approached both toilet and sink from L side to keep objects in view on R side. Practiced ambulating in [place] while scanning for various items and avoiding obstacles, requiring moderate cuing to attend to L side. Patient reported no lightheadedness today with BP stable throughout session. Continue to recommend inpatient rehab at d/c. (If patient d/c home instead, he would benefit from [facility] and would require x1 assistance for ADLs, IADLs, and to safely navigate environment. Of concern, he has limited home support.)    Current Level of Function Impacting Discharge (ADLs): contact guard assistance for toileting and grooming. Infer overall up to minimum assistance for L obstacle avoidance       PLAN :  Patient continues to benefit from skilled intervention to address the above impairments. Continue treatment per established plan of care to address goals. Recommendation for discharge: (in order for the patient to meet his/her long term goals)  Therapy 3 hours per day 5-7 days per week    This discharge recommendation:  Has been made in collaboration with the attending provider and/or case management         SUBJECTIVE:   Patient stated I don't feel lightheaded.     OBJECTIVE DATA SUMMARY:   Cognitive/Behavioral Status:  Neurologic State: Alert  Orientation Level: Oriented X4  Cognition: Appropriate safety awareness             Functional Mobility and Transfers for ADLs:  Bed Mobility:  Supine to Sit: Supervision  Scooting: Supervision    Transfers:  Sit to Stand: Stand-by assistance          Balance:  Sitting: Intact  Standing: Impaired  Standing - Static: Good  Standing - Dynamic : Fair    ADL Intervention:       Grooming  Washing Hands: Contact guard assistance (standing at sink)               Toileting  Bladder Hygiene: Contact guard assistance (standing)  Clothing Management: Contact guard assistance     Pain:  Patient did not report pain    Activity Tolerance:   VSS, good    After treatment patient left in no apparent distress:   Sitting in chair, Call bell within reach, and Bed / chair alarm activated    COMMUNICATION/COLLABORATION:   The patients plan of care was discussed with: Physical therapist and Registered nurse. Patient was educated regarding His deficit(s) of L visual field cut as this relates to His diagnosis of CVA. He demonstrated Good understanding as evidenced by verbal response. Patient and/or family was verbally educated on the BE FAST acronym for signs/symptoms of CVA and TIA. Provided with BEFAST handout. All questions answered with patient indicating good understanding.      Bradley Meadows OT  Time Calculation: 24 mins

## 2021-11-05 NOTE — PROGRESS NOTES
Problem: TIA/CVA Stroke: 0-24 hours  Goal: Activity/Safety  Outcome: Progressing Towards Goal  Goal: Consults, if ordered  Outcome: Progressing Towards Goal  Goal: Diagnostic Test/Procedures  Outcome: Progressing Towards Goal  Goal: Nutrition/Diet  Outcome: Progressing Towards Goal  Goal: Discharge Planning  Outcome: Progressing Towards Goal  Goal: Medications  Outcome: Progressing Towards Goal  Goal: Respiratory  Outcome: Progressing Towards Goal  Goal: Treatments/Interventions/Procedures  Outcome: Progressing Towards Goal  Goal: Minimize risk of bleeding post-thrombolytic infusion  Outcome: Progressing Towards Goal  Goal: Monitor for complications post-thrombolytic infusion  Outcome: Progressing Towards Goal  Goal: *Hemodynamically stable  Outcome: Progressing Towards Goal  Goal: *Neurologically stable  Description: Absence of additional neurological deficits    Outcome: Progressing Towards Goal  Goal: *Verbalizes anxiety and depression are reduced or absent  Outcome: Progressing Towards Goal  Goal: *Absence of Signs of Aspiration on Current Diet  Outcome: Progressing Towards Goal  Goal: *Absence of deep venous thrombosis signs and symptoms(Stroke Metric)  Outcome: Progressing Towards Goal  Goal: *Ability to perform ADLs and demonstrates progressive mobility and function  Outcome: Progressing Towards Goal  Goal: *Stroke education started(Stroke Metric)  Outcome: Progressing Towards Goal  Goal: *Dysphagia screen performed(Stroke Metric)  Outcome: Progressing Towards Goal  Goal: *Rehab consulted(Stroke Metric)  Outcome: Progressing Towards Goal

## 2021-11-05 NOTE — DIABETES MGMT
Diabetes Management Team to sign off at this point as patient's blood glucose remains stable with need for correctional insulin only. Education was provided to patient re: proper insulin regimen and also put into AVS for nurse to cover at discharge. Please re-consult us if patient needs change. Thank you for including us in their care.       Signed By: Bridget Sheppard CNS   [facility]    November 5, 2021

## 2021-11-05 NOTE — PROGRESS NOTES
Transition of Care Plan: IPR-TASHA accepted-auth initiated 11/04     RUR: 12%     PCP F/U: Cha Calhoun NP     Disposition: XDA-UTA-kfou initiated 11/04     Transportation: S     Main Contact: Counselor-Alphonso MAYURWB-[phone]     1551: Still waiting on auth from [facility].  Will continue to follow.      Rafa Sorto RN, CRM

## 2021-11-05 NOTE — PROGRESS NOTES
Bedside shift change report given to Chelo Landry RN (oncoming nurse) by Gabriela Saini Student RN (offgoing nurse). Report included the following information SBAR, Kardex, Intake/Output, MAR, Recent Results, Cardiac Rhythm NSR and Dual Neuro Assessment.

## 2021-11-05 NOTE — PROGRESS NOTES
Hospitalist Progress Note        Date of Service:  2021  NAME:  Lisa Kinney  :  1950  MRN:  [id number]    Admission Summary:   71M w/ CAD s/p CABG, HTN, CHF, DM, dyslipidemia, DVT on Eliquis, COPD, who presented with weakness and was found to have an acute occipital infarcts. Interval history / Subjective:   Offers no complaints, no pain, dyspnea, n/v/d     Assessment & Plan:     #. Acute CVAs:   - CT head: foci of acute/subacute infarcts in R- occipital area. CTA head/neck: No sig stenosis  - , A1c 7.9. Statin 'high intensity', aspirin.   - MRI: R PCA territory likely subacute infarcts, occluded R PCA- likely atherothrombotic- evaluated by [facility] who didn't think it needs any intervention and likely old. - TTE unremarkable. - Neuro following, needs evaluation by ophthalmology, they can't bring equipment into hospital, hence requested he follows up op closely with them. - needs rehab    #. Orthostatic hypotension: dropped by 20 points, and felt dizzy. Now stable. - was initially Suspect Seizure: pt had 3 episodes of unresponsiveness, gazing, losing tone. No clear convulsion.  - repeat CT head: NAD. PRN ativan iv, EEG: slowing on R side,   - re-consult to neurology- Keppra for 1 month. #. CAD: s/p CABG. stable, home regimen, monitor  #. Diastolic CHF: stable, continue home meds/diuretics. #. HTN: chronic, stable, Home regimen, PRN BP meds. Monitor  #. HLD: chronic, Stable, Home regimen  #. DM2: A1c 7.9, SSI, AccuChecks, monitor  #. COPD: no exacerbation, continue home regimen, DuoNebs PRN. #. Chronic nicotine dependence: counseled on health risks of nicotine. Nicotine patch daily  #. smokes marijuana. UDS +ve. Pt counseled on harmful effects. Encouraged to quit. Monitor   #. PEs: chronic- continue Eliquis.     Code status: Full  DVT prophylaxis: Eliquis  Care Plan discussed with: Patient/Family and Nurse  Disposition: awaiting rehab [facility] Problems  Date Reviewed: 10/29/2021          Codes Class Noted POA    * (Principal) Acute CVA (cerebrovascular accident) [facility]) ICD-10-CM: I63.9  ICD-9-CM: 434.91  10/29/2021 Yes        Near syncope ICD-10-CM: R55  ICD-9-CM: 780.2  1/2/2019 Unknown            Review of Systems:   Pertinent items are mentioned in interval history. Vital Signs:    Last 24hrs VS reviewed since prior progress note. Most recent are:  Visit Vitals  /79   Pulse 65   Temp 97.7 °F (36.5 °C)   Resp 11   Ht 6' (1.829 m)   Wt 104 kg (229 lb 4.5 oz)   SpO2 96%   BMI 31.10 kg/m²       No intake or output data in the 24 hours ending 11/05/21 1817     Physical Examination:   Evaluated face to face and examined 11/05/21    General:  Alert, partially oriented, No acute distress. Elderly BM. Card:  S1, S2, No murmurs, good peripheral perfusion, warm peripheries  Resp:  No accessory muscle use, Good AE, no wheezes. no crepitations  Abd:  Soft, non-tender, non-distended, BS+  Extremities:  No cyanosis or clubbing, no significant edema  Neuro:  alert, partially oriented, follows commands, speech understood, LUE weaker than R 3/5. Psych:  fair/poor insight, not agitated. Data Review:    Review and/or order of clinical lab test  Review and/or order of tests in the radiology section of CPT  Review and/or order of tests in the medicine section of CPT  Labs:     Recent Labs     11/05/21  0546   WBC 7.5   HGB 16.6   HCT 48.8        Recent Labs     11/05/21  0546      K 3.8      CO2 32   BUN 10   CREA 0.90   *   CA 9.5   MG 1.8     No results for input(s): ALT, AP, TBIL, TBILI, TP, ALB, GLOB, GGT, AML, LPSE in the last 72 hours. No lab exists for component: SGOT, GPT, AMYP, HLPSE  No results for input(s): INR, PTP, APTT, INREXT, INREXT in the last 72 hours. No results for input(s): FE, TIBC, PSAT, FERR in the last 72 hours.    Lab Results   Component Value Date/Time    Folate 12.8 10/30/2021 07:18 AM      No results for input(s): PH, PCO2, PO2 in the last 72 hours. No results for input(s): CPK, CKNDX, TROIQ in the last 72 hours.     No lab exists for component: CPKMB  Lab Results   Component Value Date/Time    Cholesterol, total 202 (H) 10/30/2021 07:18 AM    HDL Cholesterol 43 10/30/2021 07:18 AM    LDL, calculated 134.8 (H) 10/30/2021 07:18 AM    Triglyceride 121 10/30/2021 07:18 AM    CHOL/HDL Ratio 4.7 10/30/2021 07:18 AM     Lab Results   Component Value Date/Time    Glucose (POC) 214 (H) 11/05/2021 04:45 PM    Glucose (POC) 198 (H) 11/05/2021 11:03 AM    Glucose (POC) 170 (H) 11/05/2021 08:48 AM    Glucose (POC) 176 (H) 11/04/2021 10:22 PM    Glucose (POC) 185 (H) 11/04/2021 04:10 PM     Lab Results   Component Value Date/Time    Color DARK YELLOW 10/29/2021 03:23 PM    Appearance CLEAR 10/29/2021 03:23 PM    Specific gravity 1.030 10/29/2021 03:23 PM    Specific gravity 1.015 01/02/2019 03:45 PM    pH (UA) 5.5 10/29/2021 03:23 PM    Protein Negative 10/29/2021 03:23 PM    Glucose Negative 10/29/2021 03:23 PM    Ketone TRACE (A) 10/29/2021 03:23 PM    Bilirubin Negative 10/29/2021 03:23 PM    Urobilinogen 1.0 10/29/2021 03:23 PM    Nitrites Negative 10/29/2021 03:23 PM    Leukocyte Esterase SMALL (A) 10/29/2021 03:23 PM    Epithelial cells FEW 10/29/2021 03:23 PM    Bacteria Negative 10/29/2021 03:23 PM    WBC 10-20 10/29/2021 03:23 PM    RBC 0-5 10/29/2021 03:23 PM     Medications Reviewed:     Current Facility-Administered Medications   Medication Dose Route Frequency    LORazepam (ATIVAN) injection 1 mg  1 mg IntraVENous Q2H PRN    levETIRAcetam (KEPPRA) 500 mg in 0.9% sodium chloride 100 mL IVPB  500 mg IntraVENous Q12H    carvediloL (COREG) tablet 6.25 mg  6.25 mg Oral BID WITH MEALS    apixaban (ELIQUIS) tablet 5 mg  5 mg Oral Q12H    oxyCODONE IR (ROXICODONE) tablet 5 mg  5 mg Oral Q4H PRN    albuterol-ipratropium (DUO-NEB) 2.5 MG-0.5 MG/3 ML  3 mL Nebulization Q6H PRN    amitriptyline (ELAVIL) tablet 25 mg  25 mg Oral QHS    aspirin chewable tablet 81 mg  81 mg Oral DAILY    atorvastatin (LIPITOR) tablet 80 mg  80 mg Oral DAILY    furosemide (LASIX) tablet 40 mg  40 mg Oral DAILY    lisinopriL (PRINIVIL, ZESTRIL) tablet 10 mg  10 mg Oral DAILY    nicotine (NICODERM CQ) 21 mg/24 hr patch 1 Patch  1 Patch TransDERmal Q24H    pantoprazole (PROTONIX) tablet 40 mg  40 mg Oral ACB    spironolactone (ALDACTONE) tablet 25 mg  25 mg Oral DAILY    tamsulosin (FLOMAX) capsule 0.4 mg  0.4 mg Oral DAILY    methocarbamoL (ROBAXIN) tablet 750 mg  750 mg Oral TID    acetaminophen (TYLENOL) tablet 650 mg  650 mg Oral Q4H PRN    Or    acetaminophen (TYLENOL) solution 650 mg  650 mg Per NG tube Q4H PRN    Or    acetaminophen (TYLENOL) suppository 650 mg  650 mg Rectal Q4H PRN    ondansetron (ZOFRAN) injection 4 mg  4 mg IntraVENous Q6H PRN    bisacodyL (DULCOLAX) tablet 5 mg  5 mg Oral DAILY PRN    insulin lispro (HUMALOG) injection   SubCUTAneous AC&HS    glucose chewable tablet 16 g  4 Tablet Oral PRN    dextrose (D50W) injection syrg 12.5-25 g  12.5-25 g IntraVENous PRN    glucagon (GLUCAGEN) injection 1 mg  1 mg IntraMUSCular PRN   ______________________________________________________________________  EXPECTED LENGTH OF STAY: 4d 9h  ACTUAL LENGTH OF STAY:          7               Esperanza Lund MD

## 2021-11-06 LAB
GLUCOSE BLD STRIP.AUTO-MCNC: 154 MG/DL (ref 65–117)
GLUCOSE BLD STRIP.AUTO-MCNC: 200 MG/DL (ref 65–117)
GLUCOSE BLD STRIP.AUTO-MCNC: 205 MG/DL (ref 65–117)
GLUCOSE BLD STRIP.AUTO-MCNC: 218 MG/DL (ref 65–117)
SERVICE CMNT-IMP: ABNORMAL

## 2021-11-06 PROCEDURE — 74011250636 HC RX REV CODE- 250/636: Performed by: INTERNAL MEDICINE

## 2021-11-06 PROCEDURE — 65660000000 HC RM CCU STEPDOWN

## 2021-11-06 PROCEDURE — 74011636637 HC RX REV CODE- 636/637: Performed by: INTERNAL MEDICINE

## 2021-11-06 PROCEDURE — 74011250637 HC RX REV CODE- 250/637: Performed by: INTERNAL MEDICINE

## 2021-11-06 PROCEDURE — 82962 GLUCOSE BLOOD TEST: CPT

## 2021-11-06 PROCEDURE — 74011000258 HC RX REV CODE- 258: Performed by: INTERNAL MEDICINE

## 2021-11-06 RX ADMIN — APIXABAN 5 MG: 5 TABLET, FILM COATED ORAL at 21:44

## 2021-11-06 RX ADMIN — METHOCARBAMOL 750 MG: 750 TABLET ORAL at 21:44

## 2021-11-06 RX ADMIN — INSULIN LISPRO 2 UNITS: 100 INJECTION, SOLUTION INTRAVENOUS; SUBCUTANEOUS at 09:11

## 2021-11-06 RX ADMIN — SPIRONOLACTONE 25 MG: 25 TABLET ORAL at 09:11

## 2021-11-06 RX ADMIN — INSULIN LISPRO 2 UNITS: 100 INJECTION, SOLUTION INTRAVENOUS; SUBCUTANEOUS at 21:48

## 2021-11-06 RX ADMIN — ASPIRIN 81 MG CHEWABLE TABLET 81 MG: 81 TABLET CHEWABLE at 09:11

## 2021-11-06 RX ADMIN — METHOCARBAMOL 750 MG: 750 TABLET ORAL at 09:11

## 2021-11-06 RX ADMIN — LEVETIRACETAM 500 MG: 100 INJECTION, SOLUTION, CONCENTRATE INTRAVENOUS at 12:41

## 2021-11-06 RX ADMIN — CARVEDILOL 6.25 MG: 3.12 TABLET, FILM COATED ORAL at 17:09

## 2021-11-06 RX ADMIN — INSULIN LISPRO 3 UNITS: 100 INJECTION, SOLUTION INTRAVENOUS; SUBCUTANEOUS at 16:30

## 2021-11-06 RX ADMIN — FUROSEMIDE 40 MG: 40 TABLET ORAL at 09:11

## 2021-11-06 RX ADMIN — CARVEDILOL 6.25 MG: 3.12 TABLET, FILM COATED ORAL at 09:11

## 2021-11-06 RX ADMIN — AMITRIPTYLINE HYDROCHLORIDE 25 MG: 25 TABLET, FILM COATED ORAL at 21:44

## 2021-11-06 RX ADMIN — TAMSULOSIN HYDROCHLORIDE 0.4 MG: 0.4 CAPSULE ORAL at 09:11

## 2021-11-06 RX ADMIN — INSULIN LISPRO 3 UNITS: 100 INJECTION, SOLUTION INTRAVENOUS; SUBCUTANEOUS at 12:41

## 2021-11-06 RX ADMIN — PANTOPRAZOLE SODIUM 40 MG: 40 TABLET, DELAYED RELEASE ORAL at 07:11

## 2021-11-06 RX ADMIN — APIXABAN 5 MG: 5 TABLET, FILM COATED ORAL at 09:12

## 2021-11-06 RX ADMIN — LISINOPRIL 10 MG: 10 TABLET ORAL at 09:12

## 2021-11-06 RX ADMIN — ATORVASTATIN CALCIUM 80 MG: 40 TABLET, FILM COATED ORAL at 09:11

## 2021-11-06 RX ADMIN — METHOCARBAMOL 750 MG: 750 TABLET ORAL at 17:09

## 2021-11-06 RX ADMIN — LEVETIRACETAM 500 MG: 100 INJECTION, SOLUTION, CONCENTRATE INTRAVENOUS at 02:39

## 2021-11-06 NOTE — PROGRESS NOTES
Hospitalist Progress Note        Date of Service:  2021  NAME:  Neo Jacobs  :  1950  MRN:  [id number]    Admission Summary:   71M w/ CAD s/p CABG, HTN, CHF, DM, dyslipidemia, DVT on Eliquis, COPD, who presented with weakness and was found to have an acute occipital infarcts. Interval history / Subjective:   Offers no complaints, no pain, dyspnea, n/v/d. No changes. Assessment & Plan:     #. Acute CVAs:   - CT head: foci of acute/subacute infarcts in R- occipital area. CTA head/neck: No sig stenosis  - , A1c 7.9. Statin 'high intensity', aspirin.   - MRI: R PCA territory likely subacute infarcts, occluded R PCA- likely atherothrombotic- evaluated by [facility] who didn't think it needs any intervention and likely old. - TTE unremarkable. - Neuro following, needs evaluation by ophthalmology, they can't bring equipment into hospital, hence requested he follows up op closely with them. - needs rehab    #. Orthostatic hypotension: dropped by 20 points, and felt dizzy. Now stable. - was initially Suspect Seizure: pt had 3 episodes of unresponsiveness, gazing, losing tone. No clear convulsion.  - repeat CT head: NAD. PRN ativan iv, EEG: slowing on R side,   - re-consult to neurology- Keppra for 1 month. #. CAD: s/p CABG. stable, home regimen, monitor  #. Diastolic CHF: stable, continue home meds/diuretics. #. HTN: chronic, stable, Home regimen, PRN BP meds. Monitor  #. HLD: chronic, Stable, Home regimen  #. DM2: A1c 7.9, SSI, AccuChecks, monitor  #. COPD: no exacerbation, continue home regimen, DuoNebs PRN. #. Chronic nicotine dependence: counseled on health risks of nicotine. Nicotine patch daily  #. smokes marijuana. UDS +ve. Pt counseled on harmful effects. Encouraged to quit. Monitor   #. PEs: chronic- continue Eliquis.     Code status: Full  DVT prophylaxis: Eliquis  Care Plan discussed with: Patient/Family and Nurse  Disposition: awaiting rehab [facility] Problems  Date Reviewed: 10/29/2021          Codes Class Noted POA    * (Principal) Acute CVA (cerebrovascular accident) [facility]) ICD-10-CM: I63.9  ICD-9-CM: 434.91  10/29/2021 Yes        Near syncope ICD-10-CM: R55  ICD-9-CM: 780.2  1/2/2019 Unknown            Review of Systems:   Pertinent items are mentioned in interval history. Vital Signs:    Last 24hrs VS reviewed since prior progress note. Most recent are:  Visit Vitals  /88 (BP 1 Location: Right upper arm, BP Patient Position: At rest)   Pulse 84   Temp 98.4 °F (36.9 °C)   Resp 15   Ht 6' (1.829 m)   Wt 104 kg (229 lb 4.5 oz)   SpO2 98%   BMI 31.10 kg/m²       No intake or output data in the 24 hours ending 11/06/21 1629     Physical Examination:   Evaluated face to face and examined 11/06/21    General:  Alert, partially oriented, No acute distress. Elderly BM. Card:  S1, S2, No murmurs, good peripheral perfusion, warm peripheries  Resp:  No accessory muscle use, Good AE, no wheezes. no crepitations  Abd:  Soft, non-tender, non-distended, BS+  Extremities:  No cyanosis or clubbing, no significant edema  Neuro:  alert, partially oriented, follows commands, speech understood, LUE weaker than R 3/5. Left sided visual field deficit. Psych:  fair/poor insight, not agitated. Data Review:    Review and/or order of clinical lab test  Review and/or order of tests in the radiology section of CPT  Review and/or order of tests in the medicine section of CPT  Labs:     Recent Labs     11/05/21  0546   WBC 7.5   HGB 16.6   HCT 48.8        Recent Labs     11/05/21  0546      K 3.8      CO2 32   BUN 10   CREA 0.90   *   CA 9.5   MG 1.8     No results for input(s): ALT, AP, TBIL, TBILI, TP, ALB, GLOB, GGT, AML, LPSE in the last 72 hours. No lab exists for component: SGOT, GPT, AMYP, HLPSE  No results for input(s): INR, PTP, APTT, INREXT, INREXT in the last 72 hours.    No results for input(s): FE, TIBC, PSAT, FERR in the last 72 hours. Lab Results   Component Value Date/Time    Folate 12.8 10/30/2021 07:18 AM      No results for input(s): PH, PCO2, PO2 in the last 72 hours. No results for input(s): CPK, CKNDX, TROIQ in the last 72 hours.     No lab exists for component: CPKMB  Lab Results   Component Value Date/Time    Cholesterol, total 202 (H) 10/30/2021 07:18 AM    HDL Cholesterol 43 10/30/2021 07:18 AM    LDL, calculated 134.8 (H) 10/30/2021 07:18 AM    Triglyceride 121 10/30/2021 07:18 AM    CHOL/HDL Ratio 4.7 10/30/2021 07:18 AM     Lab Results   Component Value Date/Time    Glucose (POC) 200 (H) 11/06/2021 12:10 PM    Glucose (POC) 154 (H) 11/06/2021 07:15 AM    Glucose (POC) 149 (H) 11/05/2021 09:23 PM    Glucose (POC) 214 (H) 11/05/2021 04:45 PM    Glucose (POC) 198 (H) 11/05/2021 11:03 AM     Lab Results   Component Value Date/Time    Color DARK YELLOW 10/29/2021 03:23 PM    Appearance CLEAR 10/29/2021 03:23 PM    Specific gravity 1.030 10/29/2021 03:23 PM    Specific gravity 1.015 01/02/2019 03:45 PM    pH (UA) 5.5 10/29/2021 03:23 PM    Protein Negative 10/29/2021 03:23 PM    Glucose Negative 10/29/2021 03:23 PM    Ketone TRACE (A) 10/29/2021 03:23 PM    Bilirubin Negative 10/29/2021 03:23 PM    Urobilinogen 1.0 10/29/2021 03:23 PM    Nitrites Negative 10/29/2021 03:23 PM    Leukocyte Esterase SMALL (A) 10/29/2021 03:23 PM    Epithelial cells FEW 10/29/2021 03:23 PM    Bacteria Negative 10/29/2021 03:23 PM    WBC 10-20 10/29/2021 03:23 PM    RBC 0-5 10/29/2021 03:23 PM     Medications Reviewed:     Current Facility-Administered Medications   Medication Dose Route Frequency    LORazepam (ATIVAN) injection 1 mg  1 mg IntraVENous Q2H PRN    levETIRAcetam (KEPPRA) 500 mg in 0.9% sodium chloride 100 mL IVPB  500 mg IntraVENous Q12H    carvediloL (COREG) tablet 6.25 mg  6.25 mg Oral BID WITH MEALS    apixaban (ELIQUIS) tablet 5 mg  5 mg Oral Q12H    oxyCODONE IR (ROXICODONE) tablet 5 mg  5 mg Oral Q4H PRN    albuterol-ipratropium (DUO-NEB) 2.5 MG-0.5 MG/3 ML  3 mL Nebulization Q6H PRN    amitriptyline (ELAVIL) tablet 25 mg  25 mg Oral QHS    aspirin chewable tablet 81 mg  81 mg Oral DAILY    atorvastatin (LIPITOR) tablet 80 mg  80 mg Oral DAILY    furosemide (LASIX) tablet 40 mg  40 mg Oral DAILY    lisinopriL (PRINIVIL, ZESTRIL) tablet 10 mg  10 mg Oral DAILY    nicotine (NICODERM CQ) 21 mg/24 hr patch 1 Patch  1 Patch TransDERmal Q24H    pantoprazole (PROTONIX) tablet 40 mg  40 mg Oral ACB    spironolactone (ALDACTONE) tablet 25 mg  25 mg Oral DAILY    tamsulosin (FLOMAX) capsule 0.4 mg  0.4 mg Oral DAILY    methocarbamoL (ROBAXIN) tablet 750 mg  750 mg Oral TID    acetaminophen (TYLENOL) tablet 650 mg  650 mg Oral Q4H PRN    Or    acetaminophen (TYLENOL) solution 650 mg  650 mg Per NG tube Q4H PRN    Or    acetaminophen (TYLENOL) suppository 650 mg  650 mg Rectal Q4H PRN    ondansetron (ZOFRAN) injection 4 mg  4 mg IntraVENous Q6H PRN    bisacodyL (DULCOLAX) tablet 5 mg  5 mg Oral DAILY PRN    insulin lispro (HUMALOG) injection   SubCUTAneous AC&HS    glucose chewable tablet 16 g  4 Tablet Oral PRN    dextrose (D50W) injection syrg 12.5-25 g  12.5-25 g IntraVENous PRN    glucagon (GLUCAGEN) injection 1 mg  1 mg IntraMUSCular PRN   ______________________________________________________________________  EXPECTED LENGTH OF STAY: 4d 9h  ACTUAL LENGTH OF STAY:          8               Hawk To MD

## 2021-11-06 NOTE — PROGRESS NOTES
Bedside and Verbal shift change report given to [employer] (oncoming nurse) by Jeremiah Díaz RN (offgoing nurse). Report included the following information SBAR, Kardex, ED Summary, Intake/Output, MAR, Cardiac Rhythm SR, Alarm Parameters  and Dual Neuro Assessment.

## 2021-11-06 NOTE — PROGRESS NOTES
Bedside and Verbal shift change report given to [employer] (oncoming nurse) by Rachel Morrison RN (offgoing nurse). Report included the following information SBAR, Kardex, ED Summary, Intake/Output, MAR, Cardiac Rhythm SR and Dual Neuro Assessment.

## 2021-11-07 LAB
GLUCOSE BLD STRIP.AUTO-MCNC: 142 MG/DL (ref 65–117)
GLUCOSE BLD STRIP.AUTO-MCNC: 175 MG/DL (ref 65–117)
GLUCOSE BLD STRIP.AUTO-MCNC: 190 MG/DL (ref 65–117)
GLUCOSE BLD STRIP.AUTO-MCNC: 205 MG/DL (ref 65–117)
SERVICE CMNT-IMP: ABNORMAL

## 2021-11-07 PROCEDURE — 74011000258 HC RX REV CODE- 258: Performed by: INTERNAL MEDICINE

## 2021-11-07 PROCEDURE — 65660000000 HC RM CCU STEPDOWN

## 2021-11-07 PROCEDURE — 74011250637 HC RX REV CODE- 250/637: Performed by: INTERNAL MEDICINE

## 2021-11-07 PROCEDURE — 82962 GLUCOSE BLOOD TEST: CPT

## 2021-11-07 PROCEDURE — 74011250636 HC RX REV CODE- 250/636: Performed by: INTERNAL MEDICINE

## 2021-11-07 PROCEDURE — 74011636637 HC RX REV CODE- 636/637: Performed by: INTERNAL MEDICINE

## 2021-11-07 RX ORDER — POLYETHYLENE GLYCOL 3350 17 G/17G
17 POWDER, FOR SOLUTION ORAL DAILY
Status: DISCONTINUED | OUTPATIENT
Start: 2021-11-07 | End: 2021-11-09 | Stop reason: HOSPADM

## 2021-11-07 RX ORDER — AMOXICILLIN 250 MG
1 CAPSULE ORAL DAILY
Status: DISCONTINUED | OUTPATIENT
Start: 2021-11-07 | End: 2021-11-09 | Stop reason: HOSPADM

## 2021-11-07 RX ORDER — LEVETIRACETAM 500 MG/1
500 TABLET ORAL 2 TIMES DAILY
Status: DISCONTINUED | OUTPATIENT
Start: 2021-11-07 | End: 2021-11-09 | Stop reason: HOSPADM

## 2021-11-07 RX ADMIN — POLYETHYLENE GLYCOL 3350 17 G: 17 POWDER, FOR SOLUTION ORAL at 16:53

## 2021-11-07 RX ADMIN — LISINOPRIL 10 MG: 10 TABLET ORAL at 09:46

## 2021-11-07 RX ADMIN — AMITRIPTYLINE HYDROCHLORIDE 25 MG: 25 TABLET, FILM COATED ORAL at 21:38

## 2021-11-07 RX ADMIN — APIXABAN 5 MG: 5 TABLET, FILM COATED ORAL at 09:45

## 2021-11-07 RX ADMIN — CARVEDILOL 6.25 MG: 3.12 TABLET, FILM COATED ORAL at 09:45

## 2021-11-07 RX ADMIN — LEVETIRACETAM 500 MG: 500 TABLET, FILM COATED ORAL at 09:45

## 2021-11-07 RX ADMIN — DOCUSATE SODIUM 50 MG AND SENNOSIDES 8.6 MG 1 TABLET: 8.6; 5 TABLET, FILM COATED ORAL at 16:53

## 2021-11-07 RX ADMIN — CARVEDILOL 6.25 MG: 3.12 TABLET, FILM COATED ORAL at 16:53

## 2021-11-07 RX ADMIN — INSULIN LISPRO 2 UNITS: 100 INJECTION, SOLUTION INTRAVENOUS; SUBCUTANEOUS at 07:30

## 2021-11-07 RX ADMIN — TAMSULOSIN HYDROCHLORIDE 0.4 MG: 0.4 CAPSULE ORAL at 09:45

## 2021-11-07 RX ADMIN — LEVETIRACETAM 500 MG: 500 TABLET, FILM COATED ORAL at 17:26

## 2021-11-07 RX ADMIN — PANTOPRAZOLE SODIUM 40 MG: 40 TABLET, DELAYED RELEASE ORAL at 06:31

## 2021-11-07 RX ADMIN — INSULIN LISPRO 3 UNITS: 100 INJECTION, SOLUTION INTRAVENOUS; SUBCUTANEOUS at 11:30

## 2021-11-07 RX ADMIN — SPIRONOLACTONE 25 MG: 25 TABLET ORAL at 09:45

## 2021-11-07 RX ADMIN — APIXABAN 5 MG: 5 TABLET, FILM COATED ORAL at 21:38

## 2021-11-07 RX ADMIN — METHOCARBAMOL 750 MG: 750 TABLET ORAL at 21:38

## 2021-11-07 RX ADMIN — METHOCARBAMOL 750 MG: 750 TABLET ORAL at 16:53

## 2021-11-07 RX ADMIN — METHOCARBAMOL 750 MG: 750 TABLET ORAL at 09:45

## 2021-11-07 RX ADMIN — ATORVASTATIN CALCIUM 80 MG: 40 TABLET, FILM COATED ORAL at 09:45

## 2021-11-07 RX ADMIN — LEVETIRACETAM 500 MG: 100 INJECTION, SOLUTION, CONCENTRATE INTRAVENOUS at 01:03

## 2021-11-07 RX ADMIN — ASPIRIN 81 MG CHEWABLE TABLET 81 MG: 81 TABLET CHEWABLE at 09:45

## 2021-11-07 RX ADMIN — FUROSEMIDE 40 MG: 40 TABLET ORAL at 09:45

## 2021-11-07 RX ADMIN — INSULIN LISPRO 2 UNITS: 100 INJECTION, SOLUTION INTRAVENOUS; SUBCUTANEOUS at 17:25

## 2021-11-07 NOTE — PROGRESS NOTES
Hospitalist Progress Note        Date of Service:  2021  NAME:  Horacio Santos  :  1950  MRN:  [id number]    Admission Summary:   71M w/ CAD s/p CABG, HTN, CHF, DM, dyslipidemia, DVT on Eliquis, COPD, who presented with weakness and was found to have an acute occipital infarcts. Interval history / Subjective:   Patient is seen and examined at bedside around noon. Feels ok. Denied any complaints. Waiting for insurance auth   Discussed with nursing - constipated- added bowel regimen      Assessment & Plan:     #. Acute CVAs:   - CT head: foci of acute/subacute infarcts in R- occipital area. - CTA head/neck: No sig stenosis  - , A1c 7.9.   - MRI: R PCA territory likely subacute infarcts, occluded R PCA- likely atherothrombotic- evaluated by [facility] who didn't think it needs any intervention and likely old. - TTE unremarkable. - Neuro following, needs evaluation by ophthalmology, they can't bring equipment into hospital, hence requested he follows up op closely with them. - c/w Statin 'high intensity', aspirin. #. Orthostatic hypotension: dropped by 20 points, and felt dizzy. Now stable. - was initially Suspect Seizure: pt had 3 episodes of unresponsiveness, gazing, losing tone. No clear convulsion.  - repeat CT head: NAD. PRN ativan iv  - EEG: slowing on R side,   - re-consult to neurology- Keppra for 1 month. #. CAD: s/p CABG. stable, home regimen aspirin, coreg, statin  #. Diastolic CHF: stable, continue aspirn, coreg, lisinopril, lasix, statin, aldactone   #. HTN: chronic, stable, Home regimen coreg, lisinopril   #. HLD: chronic, Stable, statin   #. DM2: A1c 7.9, SSI, AccuChecks, monitor  #. COPD: no exacerbation,  DuoNebs PRN. #. Chronic nicotine dependence: counseled on health risks of nicotine. Nicotine patch daily  #. Marijuana abuse: UDS +ve. Pt counseled on harmful effects. Encouraged to quit.  Monitor   #. PEs: chronic- continue Eliquis. Code status: Full  DVT prophylaxis: Eliquis  Care Plan discussed with: Patient/Family and Nurse  Disposition: [address]. awaiting rehab auth. [facility] Problems  Date Reviewed: 10/29/2021          Codes Class Noted POA    * (Principal) Acute CVA (cerebrovascular accident) [facility]) ICD-10-CM: I63.9  ICD-9-CM: 434.91  10/29/2021 Yes        Near syncope ICD-10-CM: R55  ICD-9-CM: 780.2  1/2/2019 Unknown            Review of Systems:   Pertinent items are mentioned in interval history. Vital Signs:    Last 24hrs VS reviewed since prior progress note. Most recent are:  Visit Vitals  BP (!) 152/93 (BP 1 Location: Right arm, BP Patient Position: Lying)   Pulse 74   Temp 98.9 °F (37.2 °C)   Resp 19   Ht 6' (1.829 m)   Wt 104 kg (229 lb 4.5 oz)   SpO2 98%   BMI 31.10 kg/m²         Intake/Output Summary (Last 24 hours) at 11/7/2021 1657  Last data filed at 11/7/2021 1517  Gross per 24 hour   Intake --   Output 400 ml   Net -400 ml        Physical Examination:   Evaluated face to face and examined 11/07/21    General:  Alert, partially oriented, No acute distress. Elderly BM. Card:  S1, S2, No murmurs, good peripheral perfusion, warm peripheries  Resp:  No accessory muscle use, Good AE, no wheezes. no crepitations  Abd:  Soft, non-tender, non-distended, BS+  Extremities:  No cyanosis or clubbing, no significant edema  Neuro:  alert, partially oriented, follows commands, speech understood, LUE weaker than R 3/5. Psych:  fair/poor insight, not agitated.     Data Review:    Review and/or order of clinical lab test  Review and/or order of tests in the radiology section of CPT  Review and/or order of tests in the medicine section of CPT  Labs:     Recent Labs     11/05/21  0546   WBC 7.5   HGB 16.6   HCT 48.8        Recent Labs     11/05/21  0546      K 3.8      CO2 32   BUN 10   CREA 0.90   *   CA 9.5   MG 1.8     No results for input(s): ALT, AP, TBIL, TBILI, TP, ALB, GLOB, GGT, AML, LPSE in the last 72 hours. No lab exists for component: SGOT, GPT, AMYP, HLPSE  No results for input(s): INR, PTP, APTT, INREXT, INREXT in the last 72 hours. No results for input(s): FE, TIBC, PSAT, FERR in the last 72 hours. Lab Results   Component Value Date/Time    Folate 12.8 10/30/2021 07:18 AM      No results for input(s): PH, PCO2, PO2 in the last 72 hours. No results for input(s): CPK, CKNDX, TROIQ in the last 72 hours.     No lab exists for component: CPKMB  Lab Results   Component Value Date/Time    Cholesterol, total 202 (H) 10/30/2021 07:18 AM    HDL Cholesterol 43 10/30/2021 07:18 AM    LDL, calculated 134.8 (H) 10/30/2021 07:18 AM    Triglyceride 121 10/30/2021 07:18 AM    CHOL/HDL Ratio 4.7 10/30/2021 07:18 AM     Lab Results   Component Value Date/Time    Glucose (POC) 205 (H) 11/07/2021 01:55 PM    Glucose (POC) 142 (H) 11/07/2021 06:58 AM    Glucose (POC) 218 (H) 11/06/2021 09:40 PM    Glucose (POC) 205 (H) 11/06/2021 04:38 PM    Glucose (POC) 200 (H) 11/06/2021 12:10 PM     Lab Results   Component Value Date/Time    Color DARK YELLOW 10/29/2021 03:23 PM    Appearance CLEAR 10/29/2021 03:23 PM    Specific gravity 1.030 10/29/2021 03:23 PM    Specific gravity 1.015 01/02/2019 03:45 PM    pH (UA) 5.5 10/29/2021 03:23 PM    Protein Negative 10/29/2021 03:23 PM    Glucose Negative 10/29/2021 03:23 PM    Ketone TRACE (A) 10/29/2021 03:23 PM    Bilirubin Negative 10/29/2021 03:23 PM    Urobilinogen 1.0 10/29/2021 03:23 PM    Nitrites Negative 10/29/2021 03:23 PM    Leukocyte Esterase SMALL (A) 10/29/2021 03:23 PM    Epithelial cells FEW 10/29/2021 03:23 PM    Bacteria Negative 10/29/2021 03:23 PM    WBC 10-20 10/29/2021 03:23 PM    RBC 0-5 10/29/2021 03:23 PM     Medications Reviewed:     Current Facility-Administered Medications   Medication Dose Route Frequency    levETIRAcetam (KEPPRA) tablet 500 mg  500 mg Oral BID    senna-docusate (PERICOLACE) 8.6-50 mg per tablet 1 Tablet  1 Tablet Oral DAILY    polyethylene glycol (MIRALAX) packet 17 g  17 g Oral DAILY    LORazepam (ATIVAN) injection 1 mg  1 mg IntraVENous Q2H PRN    carvediloL (COREG) tablet 6.25 mg  6.25 mg Oral BID WITH MEALS    apixaban (ELIQUIS) tablet 5 mg  5 mg Oral Q12H    oxyCODONE IR (ROXICODONE) tablet 5 mg  5 mg Oral Q4H PRN    albuterol-ipratropium (DUO-NEB) 2.5 MG-0.5 MG/3 ML  3 mL Nebulization Q6H PRN    amitriptyline (ELAVIL) tablet 25 mg  25 mg Oral QHS    aspirin chewable tablet 81 mg  81 mg Oral DAILY    atorvastatin (LIPITOR) tablet 80 mg  80 mg Oral DAILY    furosemide (LASIX) tablet 40 mg  40 mg Oral DAILY    lisinopriL (PRINIVIL, ZESTRIL) tablet 10 mg  10 mg Oral DAILY    nicotine (NICODERM CQ) 21 mg/24 hr patch 1 Patch  1 Patch TransDERmal Q24H    pantoprazole (PROTONIX) tablet 40 mg  40 mg Oral ACB    spironolactone (ALDACTONE) tablet 25 mg  25 mg Oral DAILY    tamsulosin (FLOMAX) capsule 0.4 mg  0.4 mg Oral DAILY    methocarbamoL (ROBAXIN) tablet 750 mg  750 mg Oral TID    acetaminophen (TYLENOL) tablet 650 mg  650 mg Oral Q4H PRN    Or    acetaminophen (TYLENOL) solution 650 mg  650 mg Per NG tube Q4H PRN    Or    acetaminophen (TYLENOL) suppository 650 mg  650 mg Rectal Q4H PRN    ondansetron (ZOFRAN) injection 4 mg  4 mg IntraVENous Q6H PRN    bisacodyL (DULCOLAX) tablet 5 mg  5 mg Oral DAILY PRN    insulin lispro (HUMALOG) injection   SubCUTAneous AC&HS    glucose chewable tablet 16 g  4 Tablet Oral PRN    dextrose (D50W) injection syrg 12.5-25 g  12.5-25 g IntraVENous PRN    glucagon (GLUCAGEN) injection 1 mg  1 mg IntraMUSCular PRN   ______________________________________________________________________  EXPECTED LENGTH OF STAY: 4d 9h  ACTUAL LENGTH OF STAY:          9               Mandy Winston MD

## 2021-11-08 LAB
GLUCOSE BLD STRIP.AUTO-MCNC: 171 MG/DL (ref 65–117)
GLUCOSE BLD STRIP.AUTO-MCNC: 199 MG/DL (ref 65–117)
GLUCOSE BLD STRIP.AUTO-MCNC: 229 MG/DL (ref 65–117)
GLUCOSE BLD STRIP.AUTO-MCNC: 240 MG/DL (ref 65–117)
SERVICE CMNT-IMP: ABNORMAL

## 2021-11-08 PROCEDURE — 97116 GAIT TRAINING THERAPY: CPT

## 2021-11-08 PROCEDURE — 97535 SELF CARE MNGMENT TRAINING: CPT

## 2021-11-08 PROCEDURE — 74011636637 HC RX REV CODE- 636/637: Performed by: INTERNAL MEDICINE

## 2021-11-08 PROCEDURE — 74011250637 HC RX REV CODE- 250/637: Performed by: INTERNAL MEDICINE

## 2021-11-08 PROCEDURE — 65660000000 HC RM CCU STEPDOWN

## 2021-11-08 PROCEDURE — 82962 GLUCOSE BLOOD TEST: CPT

## 2021-11-08 PROCEDURE — 94760 N-INVAS EAR/PLS OXIMETRY 1: CPT

## 2021-11-08 RX ADMIN — LEVETIRACETAM 500 MG: 500 TABLET, FILM COATED ORAL at 08:50

## 2021-11-08 RX ADMIN — METHOCARBAMOL 750 MG: 750 TABLET ORAL at 22:51

## 2021-11-08 RX ADMIN — AMITRIPTYLINE HYDROCHLORIDE 25 MG: 25 TABLET, FILM COATED ORAL at 22:51

## 2021-11-08 RX ADMIN — APIXABAN 5 MG: 5 TABLET, FILM COATED ORAL at 08:48

## 2021-11-08 RX ADMIN — ASPIRIN 81 MG CHEWABLE TABLET 81 MG: 81 TABLET CHEWABLE at 08:49

## 2021-11-08 RX ADMIN — METHOCARBAMOL 750 MG: 750 TABLET ORAL at 16:52

## 2021-11-08 RX ADMIN — ATORVASTATIN CALCIUM 80 MG: 40 TABLET, FILM COATED ORAL at 08:48

## 2021-11-08 RX ADMIN — APIXABAN 5 MG: 5 TABLET, FILM COATED ORAL at 22:51

## 2021-11-08 RX ADMIN — DOCUSATE SODIUM 50 MG AND SENNOSIDES 8.6 MG 1 TABLET: 8.6; 5 TABLET, FILM COATED ORAL at 08:48

## 2021-11-08 RX ADMIN — INSULIN LISPRO 2 UNITS: 100 INJECTION, SOLUTION INTRAVENOUS; SUBCUTANEOUS at 08:47

## 2021-11-08 RX ADMIN — TAMSULOSIN HYDROCHLORIDE 0.4 MG: 0.4 CAPSULE ORAL at 08:48

## 2021-11-08 RX ADMIN — PANTOPRAZOLE SODIUM 40 MG: 40 TABLET, DELAYED RELEASE ORAL at 06:30

## 2021-11-08 RX ADMIN — INSULIN LISPRO 3 UNITS: 100 INJECTION, SOLUTION INTRAVENOUS; SUBCUTANEOUS at 16:52

## 2021-11-08 RX ADMIN — ACETAMINOPHEN 650 MG: 325 TABLET ORAL at 06:30

## 2021-11-08 RX ADMIN — CARVEDILOL 6.25 MG: 3.12 TABLET, FILM COATED ORAL at 08:48

## 2021-11-08 RX ADMIN — SPIRONOLACTONE 25 MG: 25 TABLET ORAL at 08:50

## 2021-11-08 RX ADMIN — POLYETHYLENE GLYCOL 3350 17 G: 17 POWDER, FOR SOLUTION ORAL at 08:48

## 2021-11-08 RX ADMIN — METHOCARBAMOL 750 MG: 750 TABLET ORAL at 08:50

## 2021-11-08 RX ADMIN — INSULIN LISPRO 3 UNITS: 100 INJECTION, SOLUTION INTRAVENOUS; SUBCUTANEOUS at 12:51

## 2021-11-08 RX ADMIN — FUROSEMIDE 40 MG: 40 TABLET ORAL at 08:48

## 2021-11-08 RX ADMIN — CARVEDILOL 6.25 MG: 3.12 TABLET, FILM COATED ORAL at 16:52

## 2021-11-08 RX ADMIN — LISINOPRIL 10 MG: 10 TABLET ORAL at 08:48

## 2021-11-08 RX ADMIN — LEVETIRACETAM 500 MG: 500 TABLET, FILM COATED ORAL at 17:20

## 2021-11-08 NOTE — PROGRESS NOTES
Hospitalist Progress Note        Date of Service:  2021  NAME:  Neo Jacobs  :  1950  MRN:  [id number]    Admission Summary:   71M w/ CAD s/p CABG, HTN, CHF, DM, dyslipidemia, DVT on Eliquis, COPD, who presented with weakness and was found to have an acute occipital infarcts. Interval history / Subjective:   Patient is seen and examined   Awake and alert  Left sided vision loss      Assessment & Plan:     #. Acute CVAs:   - CT head: foci of acute/subacute infarcts in R- occipital area. - CTA head/neck: No sig stenosis  - , A1c 7.9.   - MRI: R PCA territory likely subacute infarcts, occluded R PCA- likely atherothrombotic- evaluated by [facility] who didn't think it needs any intervention and likely old. - TTE unremarkable. - Neuro following, needs evaluation by ophthalmology, they can't bring equipment into hospital, hence requested he follows up op closely with them. - c/w Statin 'high intensity', aspirin. #. Orthostatic hypotension: dropped by 20 points, and felt dizzy. Now stable. - was initially Suspect Seizure: pt had 3 episodes of unresponsiveness, gazing, losing tone. No clear convulsion.  - repeat CT head: NAD. PRN ativan iv  - EEG: slowing on R side,   - re-consult to neurology- Keppra for 1 month. #. CAD: s/p CABG. stable, home regimen aspirin, coreg, statin  #. Diastolic CHF: stable, continue aspirn, coreg, lisinopril, lasix, statin, aldactone   #. HTN: chronic, stable, Home regimen coreg, lisinopril   #. HLD: chronic, Stable, statin   #. DM2: A1c 7.9, SSI, AccuChecks, monitor  #. COPD: no exacerbation,  DuoNebs PRN. #. Chronic nicotine dependence: counseled on health risks of nicotine. Nicotine patch daily  #. Marijuana abuse: UDS +ve. Pt counseled on harmful effects. Encouraged to quit. Monitor   #. PEs: chronic- continue Eliquis.     Code status: Full  DVT prophylaxis: Eliquis  Care Plan discussed with: Patient/Family and Nurse  Disposition:denied by [facility], not safe to return to group home due to significant visual deficit, needs SNF placemen t     Hospital Problems  Date Reviewed: 10/29/2021          Codes Class Noted POA    * (Principal) Acute CVA (cerebrovascular accident) [facility]) ICD-10-CM: I63.9  ICD-9-CM: 434.91  10/29/2021 Yes        Near syncope ICD-10-CM: R55  ICD-9-CM: 780.2  1/2/2019 Unknown            Review of Systems:   Pertinent items are mentioned in interval history. Vital Signs:    Last 24hrs VS reviewed since prior progress note. Most recent are:  Visit Vitals  BP (!) 141/91 (BP 1 Location: Right upper arm, BP Patient Position: Lying)   Pulse 70   Temp 97.7 °F (36.5 °C)   Resp 21   Ht 6' (1.829 m)   Wt 104 kg (229 lb 4.5 oz)   SpO2 99%   BMI 31.10 kg/m²         Intake/Output Summary (Last 24 hours) at 11/8/2021 1224  Last data filed at 11/7/2021 1517  Gross per 24 hour   Intake --   Output 400 ml   Net -400 ml        Physical Examination:   Evaluated face to face and examined 11/08/21    General:  Alert, partially oriented, No acute distress. Elderly BM. Card:  S1, S2, No murmurs, good peripheral perfusion, warm peripheries  Resp:  No accessory muscle use, Good AE, no wheezes. no crepitations  Abd:  Soft, non-tender, non-distended, BS+  Extremities:  No cyanosis or clubbing, no significant edema  Neuro:  alert, partially oriented, follows commands, speech understood, LUE weaker than R 3/5 left VFD. Psych:  fair/poor insight, not agitated. Data Review:    Review and/or order of clinical lab test  Review and/or order of tests in the radiology section of CPT  Review and/or order of tests in the medicine section of CPT  Labs:     No results for input(s): WBC, HGB, HCT, PLT, HGBEXT, HCTEXT, PLTEXT, HGBEXT, HCTEXT, PLTEXT in the last 72 hours. No results for input(s): NA, K, CL, CO2, BUN, CREA, GLU, CA, MG, PHOS, URICA in the last 72 hours.   No results for input(s): ALT, AP, TBIL, TBILI, TP, ALB, GLOB, GGT, AML, LPSE in the last 72 hours. No lab exists for component: SGOT, GPT, AMYP, HLPSE  No results for input(s): INR, PTP, APTT, INREXT, INREXT in the last 72 hours. No results for input(s): FE, TIBC, PSAT, FERR in the last 72 hours. Lab Results   Component Value Date/Time    Folate 12.8 10/30/2021 07:18 AM      No results for input(s): PH, PCO2, PO2 in the last 72 hours. No results for input(s): CPK, CKNDX, TROIQ in the last 72 hours.     No lab exists for component: CPKMB  Lab Results   Component Value Date/Time    Cholesterol, total 202 (H) 10/30/2021 07:18 AM    HDL Cholesterol 43 10/30/2021 07:18 AM    LDL, calculated 134.8 (H) 10/30/2021 07:18 AM    Triglyceride 121 10/30/2021 07:18 AM    CHOL/HDL Ratio 4.7 10/30/2021 07:18 AM     Lab Results   Component Value Date/Time    Glucose (POC) 171 (H) 11/08/2021 06:58 AM    Glucose (POC) 190 (H) 11/07/2021 09:45 PM    Glucose (POC) 175 (H) 11/07/2021 04:58 PM    Glucose (POC) 205 (H) 11/07/2021 01:55 PM    Glucose (POC) 142 (H) 11/07/2021 06:58 AM     Lab Results   Component Value Date/Time    Color DARK YELLOW 10/29/2021 03:23 PM    Appearance CLEAR 10/29/2021 03:23 PM    Specific gravity 1.030 10/29/2021 03:23 PM    Specific gravity 1.015 01/02/2019 03:45 PM    pH (UA) 5.5 10/29/2021 03:23 PM    Protein Negative 10/29/2021 03:23 PM    Glucose Negative 10/29/2021 03:23 PM    Ketone TRACE (A) 10/29/2021 03:23 PM    Bilirubin Negative 10/29/2021 03:23 PM    Urobilinogen 1.0 10/29/2021 03:23 PM    Nitrites Negative 10/29/2021 03:23 PM    Leukocyte Esterase SMALL (A) 10/29/2021 03:23 PM    Epithelial cells FEW 10/29/2021 03:23 PM    Bacteria Negative 10/29/2021 03:23 PM    WBC 10-20 10/29/2021 03:23 PM    RBC 0-5 10/29/2021 03:23 PM     Medications Reviewed:     Current Facility-Administered Medications   Medication Dose Route Frequency    levETIRAcetam (KEPPRA) tablet 500 mg  500 mg Oral BID    senna-docusate (PERICOLACE) 8.6-50 mg per tablet 1 Tablet 1 Tablet Oral DAILY    polyethylene glycol (MIRALAX) packet 17 g  17 g Oral DAILY    LORazepam (ATIVAN) injection 1 mg  1 mg IntraVENous Q2H PRN    carvediloL (COREG) tablet 6.25 mg  6.25 mg Oral BID WITH MEALS    apixaban (ELIQUIS) tablet 5 mg  5 mg Oral Q12H    albuterol-ipratropium (DUO-NEB) 2.5 MG-0.5 MG/3 ML  3 mL Nebulization Q6H PRN    amitriptyline (ELAVIL) tablet 25 mg  25 mg Oral QHS    aspirin chewable tablet 81 mg  81 mg Oral DAILY    atorvastatin (LIPITOR) tablet 80 mg  80 mg Oral DAILY    furosemide (LASIX) tablet 40 mg  40 mg Oral DAILY    lisinopriL (PRINIVIL, ZESTRIL) tablet 10 mg  10 mg Oral DAILY    nicotine (NICODERM CQ) 21 mg/24 hr patch 1 Patch  1 Patch TransDERmal Q24H    pantoprazole (PROTONIX) tablet 40 mg  40 mg Oral ACB    spironolactone (ALDACTONE) tablet 25 mg  25 mg Oral DAILY    tamsulosin (FLOMAX) capsule 0.4 mg  0.4 mg Oral DAILY    methocarbamoL (ROBAXIN) tablet 750 mg  750 mg Oral TID    acetaminophen (TYLENOL) tablet 650 mg  650 mg Oral Q4H PRN    Or    acetaminophen (TYLENOL) solution 650 mg  650 mg Per NG tube Q4H PRN    Or    acetaminophen (TYLENOL) suppository 650 mg  650 mg Rectal Q4H PRN    ondansetron (ZOFRAN) injection 4 mg  4 mg IntraVENous Q6H PRN    bisacodyL (DULCOLAX) tablet 5 mg  5 mg Oral DAILY PRN    insulin lispro (HUMALOG) injection   SubCUTAneous AC&HS    glucose chewable tablet 16 g  4 Tablet Oral PRN    dextrose (D50W) injection syrg 12.5-25 g  12.5-25 g IntraVENous PRN    glucagon (GLUCAGEN) injection 1 mg  1 mg IntraMUSCular PRN   ______________________________________________________________________  EXPECTED LENGTH OF STAY: 4d 9h  ACTUAL LENGTH OF STAY:          10               Bre Brandt MD

## 2021-11-08 NOTE — PROGRESS NOTES
Problem: Mobility Impaired (Adult and Pediatric)  Goal: *Acute Goals and Plan of Care (Insert Text)  Description: FUNCTIONAL STATUS PRIOR TO ADMISSION: Patient was independent and active without use of DME.    HOME SUPPORT PRIOR TO ADMISSION: The patient lived with three roommates. Questionable living situation? Physical Therapy Goals  Reassessed and remain appropriate 11/5/2021    Initiated 10/30/2021  1. Patient will move from supine to sit and sit to supine  in bed with modified independence within 7 day(s). 2.  Patient will transfer from bed to chair and chair to bed with modified independence using the least restrictive device within 7 day(s). 3.  Patient will perform sit to stand with modified independence within 7 day(s). 4.  Patient will ambulate with modified independence for 150 feet with the least restrictive device within 7 day(s). 5.  Patient will ascend/descend 12 stairs with 1 handrail(s) with modified independence within 7 day(s). Outcome: Progressing Towards Goal   PHYSICAL THERAPY TREATMENT  Patient: Gaby Duvall (46 y.o. male)  Date: 11/8/2021  Diagnosis: Acute CVA (cerebrovascular accident) [facility]) [I63.9]   Acute CVA (cerebrovascular accident) [facility])       Precautions: Fall, Spinal (precautions for back pain; Open heart surgery 2017)  Chart, physical therapy assessment, plan of care and goals were reviewed. ASSESSMENT  Patient continues with skilled PT services and is progressing towards goals. Pt received supine in bed and agreeable to therapy. Pt continues to be limited by L inattention, impaired vision on the L, decreased functional mobility, impaired balance and gait, increased risk for falls from dependency from baseline. Pt continues to require constant CGA for gait training in the hallway and max verbal cues for head and full body rotation to locate objects on the L due to poor vision. Noted L arm swing impaired and wide BASSEM during gait training, but no overt LOB noted. Pt continues to require skilled PT services to improve overall safety awareness, balance and gait. Pt remained seated in the chair with all needs met. Continue to recommend inpatient rehab upon discharge     Current Level of Function Impacting Discharge (mobility/balance): CGA gait training with max verbal cues for safety to attend to the L side    Other factors to consider for discharge: previously independent         PLAN :  Patient continues to benefit from skilled intervention to address the above impairments. Continue treatment per established plan of care. to address goals. Recommendation for discharge: (in order for the patient to meet his/her long term goals)  Therapy 3 hours per day 5-7 days per week    This discharge recommendation:  Has been made in collaboration with the attending provider and/or case management    IF patient discharges home will need the following DME: to be determined (TBD)       SUBJECTIVE:   Patient stated I was having strokes without even knowing it. I almost got hit by a car because I couldn't see.     OBJECTIVE DATA SUMMARY:   Critical Behavior:  Neurologic State: Alert  Orientation Level: Oriented X4  Cognition: Follows commands  Safety/Judgement: Decreased insight into deficits, Decreased awareness of need for safety (receptive to learning about visual deficits)  Functional Mobility Training:  Bed Mobility:     Supine to Sit: Supervision              Transfers:  Sit to Stand: Stand-by assistance  Stand to Sit: Stand-by assistance                             Balance:  Sitting: Intact  Standing: Impaired  Standing - Static: Good  Standing - Dynamic : Fair  Ambulation/Gait Training:  Distance (ft): 50 Feet (ft)  Assistive Device: Gait belt  Ambulation - Level of Assistance: Contact guard assistance (max verbal cues for L side awareness and object identificati)        Gait Abnormalities: Decreased step clearance;  Altered arm swing        Base of Support: Widened Speed/Kina: Slow  Step Length: Right shortened; Right lengthened                    Stairs: Therapeutic Exercises:     Pain Rating:  Pt denied pain    Activity Tolerance:   Good    After treatment patient left in no apparent distress:   Sitting in chair, Call bell within reach, Bed / chair alarm activated, and Side rails x 3    COMMUNICATION/COLLABORATION:   The patients plan of care was discussed with: Occupational therapist, Registered nurse, Physician, and Case management.      Divya Chapa, PT, DPT   Time Calculation: 20 mins

## 2021-11-08 NOTE — PROGRESS NOTES
Problem: Self Care Deficits Care Plan (Adult)  Goal: *Acute Goals and Plan of Care (Insert Text)  Description: FUNCTIONAL STATUS PRIOR TO ADMISSION: I PTA, no drivers license; has  who drives him to appts, incarcerated x 25 yrs until recently per chart    HOME SUPPORT: lives w/3 others in small home; walk in shower; has \"\" whom he called when he needed assist to hospital 10-2 this year. Occupational Therapy Goals  Weekly reassessment 11/8/21- all goals remain appropriate; Initiated 10/30/2021   1. Patient will perform scanning environment with during meals with modified independence within 7 day(s). 2.  Patient will perform simple home management with safe environmental scanning with modified independence within 7 day(s). 3  Patient will participate in upper extremity therapeutic coordination exercise/activities with supervision/set-up within 7 day(s). 4.  Patient will utilize energy conservation, fall prevention techniques during functional activities with verbal cues within 7 day(s). 5.  Patient will improve their Fugl Marlow score by 5 points in prep for ADLs within 7 days. 6.  Patient will verbalize understanding of BE FAST and personal risk factors for CVA in 7 days       Outcome: Progressing Towards Goal   OCCUPATIONAL THERAPY TREATMENT/WEEKLY RE-EVALUATION  Patient: Tana James (48 y.o. male)  Date: 11/8/2021  Diagnosis: Acute CVA (cerebrovascular accident) [facility]) [I63.9]   Acute CVA (cerebrovascular accident) [facility])       Precautions: Fall, Spinal (precautions for back pain; Open heart surgery 2017)  Chart, occupational therapy assessment, plan of care, and goals were reviewed. ASSESSMENT  Based on the objective data described below with slow progression towards OT goals. Received in bed, agreeable to therapy. Oriented x4. He continues to be limited with L inattention and L visual field cut which impact his functional balance, mobility and safety awareness.   He completed scanning during ambulation with poor vision acuity. He reports he wears glasses at baseline, not in hospital.  He needed additional time with min vues for L sided scanning for item retrieval.  He completed oral care standing at sink with supervision, one vc for ADL item retrieval on left side. Current Level of Function Impacting Discharge (ADLs): supervision standing grooming, supervision socks, SBA for self care transfers without AD    Other factors to consider for discharge: Patient is presenting below baseline and lives in group home where he must be indep and was indep with ambulation at community level. At this time he is a high risk for falls, being hit by a car in the community, and need for 24 hour supervision. Recommend continued skilled OT services. PLAN :  Goals have been updated based on progression since last assessment. Patient continues to benefit from skilled intervention to address the above impairments. Continue to follow patient 5 times a week to address goals. Recommend with staff: toileting with staff to bathroom, up in chair for meals. Recommend next OT session: complete updated ryan Peraza mgmt task    Recommendation for discharge: (in order for the patient to meet his/her long term goals)  Therapy 3 hours per day 5-7 days per week    This discharge recommendation:  Has been made in collaboration with the attending provider and/or case management    Equipment recommendations for successful discharge (if) home: none       SUBJECTIVE:   Patient stated That was a sneaky event.  regarding rapid response in bathroom    OBJECTIVE DATA SUMMARY:   Cognitive/Behavioral Status:                      Functional Mobility and Transfers for ADLs:  Bed Mobility:  Supine to Sit: Supervision    Transfers:  Sit to Stand: Stand-by assistance          Balance:  Sitting: Intact  Standing: Impaired  Standing - Static: Good  Standing - Dynamic : Fair    ADL Intervention: Grooming  Position Performed: Standing  Washing Hands: Supervision  Brushing Teeth: Supervision (min cues for scanning to locate ADL items)         Lower Body Dressing Assistance  Socks: Supervision           Pain:  No c/o pain    Activity Tolerance:   Fair    After treatment patient left in no apparent distress:   Sitting in chair, Call bell within reach, and Bed / chair alarm activated    COMMUNICATION/COLLABORATION:   The patients plan of care was discussed with: Physical Therapist and Registered Nurse    Elisa Hardy OT  Time Calculation: 21 mins

## 2021-11-08 NOTE — PROGRESS NOTES
Transition of Care Plan: [facility]-accepted     RUR: 12%     PCP F/U: Cha Calhoun NP     Disposition: SNF-[facility] accepted     Transportation: BLS     Main Contact: Counselor-Alphonso PICKENSUQAG-[phone]: Spoke with Sarah Campbell at [facility] who states that patient's insurance has denied IPR for lack of medical necessity. P2P can be done by calling [phone] [facility]#[id number]. [id number] notified. 1250: Spoke with attending and therapy. Would like to try SNF is that is an option. Will send out referrals. 1623: [facility] accepted. Plan to discharge tomorrow. Will continue to follow.      Nai Ralph RN, CRM     Transition of Care Plan:     The Plan for Transition of Care is related to the following treatment goals: SNF    The Patient and/or patient representative was provided with a choice of provider and agrees  with the discharge plan. Yes [x] No []    A Freedom of choice list was provided with basic dialogue that supports the patient's individualized plan of care/goals and shares the quality data associated with the providers.        Yes [x] No []

## 2021-11-09 VITALS
WEIGHT: 229.28 LBS | OXYGEN SATURATION: 96 % | HEIGHT: 72 IN | SYSTOLIC BLOOD PRESSURE: 113 MMHG | HEART RATE: 82 BPM | BODY MASS INDEX: 31.05 KG/M2 | TEMPERATURE: 98.1 F | DIASTOLIC BLOOD PRESSURE: 50 MMHG | RESPIRATION RATE: 17 BRPM

## 2021-11-09 LAB
GLUCOSE BLD STRIP.AUTO-MCNC: 192 MG/DL (ref 65–117)
GLUCOSE BLD STRIP.AUTO-MCNC: 194 MG/DL (ref 65–117)
GLUCOSE BLD STRIP.AUTO-MCNC: 288 MG/DL (ref 65–117)
SERVICE CMNT-IMP: ABNORMAL

## 2021-11-09 PROCEDURE — 97112 NEUROMUSCULAR REEDUCATION: CPT

## 2021-11-09 PROCEDURE — 74011636637 HC RX REV CODE- 636/637: Performed by: INTERNAL MEDICINE

## 2021-11-09 PROCEDURE — 74011250637 HC RX REV CODE- 250/637: Performed by: INTERNAL MEDICINE

## 2021-11-09 PROCEDURE — 82962 GLUCOSE BLOOD TEST: CPT

## 2021-11-09 RX ADMIN — TAMSULOSIN HYDROCHLORIDE 0.4 MG: 0.4 CAPSULE ORAL at 08:16

## 2021-11-09 RX ADMIN — POLYETHYLENE GLYCOL 3350 17 G: 17 POWDER, FOR SOLUTION ORAL at 08:14

## 2021-11-09 RX ADMIN — CARVEDILOL 6.25 MG: 3.12 TABLET, FILM COATED ORAL at 08:16

## 2021-11-09 RX ADMIN — INSULIN LISPRO 2 UNITS: 100 INJECTION, SOLUTION INTRAVENOUS; SUBCUTANEOUS at 08:14

## 2021-11-09 RX ADMIN — ATORVASTATIN CALCIUM 80 MG: 40 TABLET, FILM COATED ORAL at 08:15

## 2021-11-09 RX ADMIN — PANTOPRAZOLE SODIUM 40 MG: 40 TABLET, DELAYED RELEASE ORAL at 07:30

## 2021-11-09 RX ADMIN — APIXABAN 5 MG: 5 TABLET, FILM COATED ORAL at 08:15

## 2021-11-09 RX ADMIN — SPIRONOLACTONE 25 MG: 25 TABLET ORAL at 08:19

## 2021-11-09 RX ADMIN — LEVETIRACETAM 500 MG: 500 TABLET, FILM COATED ORAL at 17:39

## 2021-11-09 RX ADMIN — LEVETIRACETAM 500 MG: 500 TABLET, FILM COATED ORAL at 08:16

## 2021-11-09 RX ADMIN — CARVEDILOL 6.25 MG: 3.12 TABLET, FILM COATED ORAL at 16:59

## 2021-11-09 RX ADMIN — DOCUSATE SODIUM 50 MG AND SENNOSIDES 8.6 MG 1 TABLET: 8.6; 5 TABLET, FILM COATED ORAL at 08:14

## 2021-11-09 RX ADMIN — ASPIRIN 81 MG CHEWABLE TABLET 81 MG: 81 TABLET CHEWABLE at 08:15

## 2021-11-09 RX ADMIN — FUROSEMIDE 40 MG: 40 TABLET ORAL at 08:19

## 2021-11-09 RX ADMIN — INSULIN LISPRO 2 UNITS: 100 INJECTION, SOLUTION INTRAVENOUS; SUBCUTANEOUS at 16:59

## 2021-11-09 RX ADMIN — LISINOPRIL 10 MG: 10 TABLET ORAL at 08:19

## 2021-11-09 RX ADMIN — METHOCARBAMOL 750 MG: 750 TABLET ORAL at 16:59

## 2021-11-09 RX ADMIN — INSULIN LISPRO 5 UNITS: 100 INJECTION, SOLUTION INTRAVENOUS; SUBCUTANEOUS at 12:18

## 2021-11-09 RX ADMIN — METHOCARBAMOL 750 MG: 750 TABLET ORAL at 08:16

## 2021-11-09 NOTE — PROGRESS NOTES
Transition of Care Plan: [facility]-accepted  RN to call report to [phone]-[facility] unit     RUR: 12%     PCP F/U: Cha Calhoun NP     Disposition: [facility]-Room #906     Transportation: [facility]-1715     Main Contact: Counselor-Alphonso CERDAXABN-[phone]    1455: Was notified from [facility] that they are checking to see if they will have a bed for patient this morning. 1320: Telephone call to [facility] to see if they can accept today. Left VM for them to return call. 1536: [facility] states that they can accept patient today. Room #906. RN to call report to [phone]. [facility] to  at 1715. RN notified. Will continue to follow. Kashmir Diaz RN, CRM    Transition of Care Plan to SNF/Rehab    SNF/Rehab Transition:  Patient has been accepted to [facility] and meets criteria for admission. Patient will transported by [facility] and expected to leave at 1715. Communication to Patient/Family:  Met with patient and (identified care giver) and they are agreeable to the transition plan. Communication to SNF/Rehab:  Bedside RN, Oswaldo, has been notified to update the transition plan to the facility and call report (phone number [phone]). Discharge information has been updated on the AVS.     Discharge instructions to be sent to facility      Nursing Please include all hard scripts for controlled substances, med rec and dc summary, and AVS in packet.      Reviewed and confirmed with facility,[facility], can manage the patient care needs for the following:     Oswaldo with (X) only those applicable:    Medication:  [x]  Medications will be available at the facility  []  IV Antibiotics   []  Controlled Substance - hard copy to be sent with patient   [x]  Weekly Labs   Documents:  [] Hard RX  [] MAR  [x] Kardex  [] AVS  []Transfer Summary  []Discharge   Equipment:  []  CPAP/BiPAP  []  Wound Vacuum  []  Soni or Urinary Device  []  PICC/Central Line  []  Nebulizer  []  Ventilator Treatment:  []Isolation (for MRSA, VRE, etc.)  []Surgical Drain Management  []Tracheostomy Care  []Dressing Changes  []Dialysis with transportation and chair time   []PEG Care  []Oxygen  []Daily Weights for Heart Failure   Dietary:  []Any diet limitations  []Tube Feedings   []Total Parenteral Management (TPN)   Eligible for Medicaid Long Term Services and Supports  Yes:  [] Eligible for medical assistance or will become eligible within 180 days and UAI completed. [] Provider/Patient and/or support system has requested screening. [x] UAI copy provided to patient or responsible party  [] UAI unavailable at discharge will send once processed to SNF provider. [] UAI unavailable at discharged mailed to patient  No:   [] Private pay and is not financially eligible for Medicaid within the next 180 days. [] Reside out-of-state.   [] A residents of a state owned/operated facility that is licensed  by [place] [address] [employer] or [facility]  [] Enrollment in [facility] hospice services  []  Medical [address]  [] Patient /Family declines to have screening completed or provide financial information for screening     Financial Resources:  Medicaid    [] Initiated and application pending   [x] Full coverage     Advanced Care Plan:  []Surrogate Decision Maker of Care  []POA  []Communicated Code Status-Full   Other

## 2021-11-09 NOTE — PROGRESS NOTES
TRANSFER - OUT REPORT:    Verbal report given to Marija(name) on Louie Mckeon  being transferred to [facility]note 2[facility] for routine progression of care       Report consisted of patients Situation, Background, Assessment and   Recommendations(SBAR). Information from the following report(s) SBAR, Kardex, Intake/Output, MAR, Recent Results and Cardiac Rhythm NSR was reviewed with the receiving nurse. Lines:       Opportunity for questions and clarification was provided.       Patient transported with:   Monitor

## 2021-11-09 NOTE — PROGRESS NOTES
Problem: Self Care Deficits Care Plan (Adult)  Goal: *Acute Goals and Plan of Care (Insert Text)  Description: FUNCTIONAL STATUS PRIOR TO ADMISSION: I PTA, no drivers license; has  who drives him to appts, incarcerated x 25 yrs until recently per chart    HOME SUPPORT: lives w/3 others in small home; walk in shower; has \"\" whom he called when he needed assist to hospital 10-2 this year. Occupational Therapy Goals  Weekly reassessment 11/8/21- all goals remain appropriate; Initiated 10/30/2021   1. Patient will perform scanning environment with during meals with modified independence within 7 day(s). 2.  Patient will perform simple home management with safe environmental scanning with modified independence within 7 day(s). 3  Patient will participate in upper extremity therapeutic coordination exercise/activities with supervision/set-up within 7 day(s). 4.  Patient will utilize energy conservation, fall prevention techniques during functional activities with verbal cues within 7 day(s). 5.  Patient will improve their Fugl Marlow score by 5 points in prep for ADLs within 7 days. 6.  Patient will verbalize understanding of BE FAST and personal risk factors for CVA in 7 days      Outcome: Progressing Towards Goal    OCCUPATIONAL THERAPY TREATMENT  Patient: Lilliana Lund (20 y.o. male)  Date: 11/9/2021  Diagnosis: Acute CVA (cerebrovascular accident) [facility]) [I63.9]   Acute CVA (cerebrovascular accident) [facility])       Precautions: Fall, Spinal (precautions for back pain; Open heart surgery 2017)  Chart, occupational therapy assessment, plan of care, and goals were reviewed. ASSESSMENT  Patient is progressing in OT goals but remains limited by L homonymous hemianopsia and mild standing balance impairment. Patient continues to require significant cuing to compensate for L visual field cut, although improving.  Practiced ambulating in [place] while scanning for various items and avoiding obstacles, requiring minimum to moderate cuing to attend to L side. At one point patient compensated to the point that he sustained L head turn and then required cuing to attend to unaffected R side. Also during one head turn, patient had a mild LOB requiring minimum assistance to correct. Continue to recommend rehab at d/c. (If patient d/c home instead, he would benefit from [facility] and would require x1 assistance for ADLs, IADLs, and to safely navigate environment. Of concern, he has limited home support.)     Current Level of Function Impacting Discharge (ADLs): up to minimum assistance        PLAN :  Patient continues to benefit from skilled intervention to address the above impairments. Continue treatment per established plan of care to address goals. Recommendation for discharge: (in order for the patient to meet his/her long term goals)  Therapy 3 hours per day 5-7 days per week    This discharge recommendation:  Has been made in collaboration with the attending provider and/or case management         SUBJECTIVE:   Patient stated I lost my balance a bit.     OBJECTIVE DATA SUMMARY:   Cognitive/Behavioral Status:  Neurologic State: Alert  Orientation Level: Oriented X4; Other (Comment) (periods of confusion)  Cognition: Follows commands          Functional Mobility and Transfers for ADLs:    Transfers:  Sit to Stand: Contact guard assistance     Bed to Chair: Contact guard assistance    Balance:  Sitting: Intact  Standing: Impaired  Standing - Static: Good  Standing - Dynamic : Fair       Pain:  Patient did not report pain    Activity Tolerance:   Good    After treatment patient left in no apparent distress:   Sitting in chair, Call bell within reach, and Bed / chair alarm activated    COMMUNICATION/COLLABORATION:   The patients plan of care was discussed with: Physical therapist and Registered nurse.      Patient was educated regarding His deficit(s) of impaired balance and L visual field cut as this relates to His diagnosis of CVA. He demonstrated Good understanding as evidenced by verbal response. Patient and/or family was verbally educated on the BE FAST acronym for signs/symptoms of CVA and TIA. BE FAST was written on patient's communication board  for visual education and reinforcement. All questions answered with patient indicating good understanding.      Caro Kahn OT  Time Calculation: 29 mins

## 2021-11-09 NOTE — DISCHARGE INSTRUCTIONS
Diabetes Management  · Check your blood sugar BEFORE your meals and BEFORE bedtime    · Take your Lantus (long acting) insulin ONCE a day-30units    · Take your Regular (short acting) insulin, 10 units,  with BREAKFAST and DINNER only  · Use a calendar to check and check off each day as you give yourself insulin  · Eat  well-balanced meals (try not to skip meals)  · REDUCE your intake of potatoes/pasta/breads/rice and sweets  · NON-starchy vegetables are best  · Drink lots of water-this will naturally lower your blood sugar  · Try to get 15-30minutes of exercise per day-this will also naturally lower your blood sugar. · IF  You skip a meal or dont eat more than 1/2 of your meal, do NOT take your short acting (regular) insulin. Discharge Instructions       PATIENT ID: Bishop Land  MRN: [id number]   YOB: 1950    DATE OF ADMISSION: 10/29/2021  3:50 PM    DATE OF DISCHARGE: 11/9/2021    PRIMARY CARE PROVIDER: Ravindra Lew NP     ATTENDING PHYSICIAN: John Colindres MD  DISCHARGING PROVIDER: Eileen Canavan, MD    To contact this individual call [phone] and ask the  to page. If unavailable ask to be transferred the Adult Hospitalist Department. DISCHARGE DIAGNOSES acute stroke. Episodes of possible seizure.     CONSULTATIONS: IP CONSULT TO NEUROLOGY  IP CONSULT TO NEUROINTERVENTIONAL SURGERY  IP CONSULT TO OPHTHALMOLOGY    PROCEDURES/SURGERIES: * No surgery found *    FOLLOW UP APPOINTMENTS:   Follow-up Information     Follow up With Specialties Details Why Contact Info    Ravindra Lew NP Nurse Practitioner On 11/8/2021 Hospital follow up PCP appointment Monday, 11/8/21 at 10:30 a.m.  [address] [facility]      Reina Rivera MD Neurology In 1 month  [facility]  [phone]             ADDITIONAL CARE RECOMMENDATIONS: follow up with PCP and neurology    DIET: Resume previous diet    DISCHARGE MEDICATIONS:  Keppra, ensure taking aspirin, atorvastatin. Control BP. · It is important that you take the medication exactly as they are prescribed. · Keep your medication in the bottles provided by the pharmacist and keep a list of the medication names, dosages, and times to be taken in your wallet. · Do not take other medications without consulting your doctor. NOTIFY YOUR PHYSICIAN FOR ANY OF THE FOLLOWING:   Fever over 101 degrees for 24 hours. Chest pain, shortness of breath, fever, chills, nausea, vomiting, diarrhea, change in mentation, falling, weakness, bleeding. Severe pain or pain not relieved by medications. Or, any other signs or symptoms that you may have questions about. It was our pleasure to help take care of you and we hope you get well very soon. DISPOSITION:    Home With:   OT  PT  HH  RN       SNF/Inpatient Rehab/LTAC   x Independent/assisted living    Hospice    Other:     CDMP Checked:   Yes x     PROBLEM LIST Updated:  Yes x     71M w/ CAD s/p CABG, HTN, CHF, DM, dyslipidemia, DVT on Eliquis, COPD, who presented with weakness and was found to have an acute occipital infarcts. #. Acute CVAs:   - CT head: foci of acute/subacute infarcts in R- occipital area. - CTA head/neck: No sig stenosis  - , A1c 7.9.   - MRI: R PCA territory likely subacute infarcts, occluded R PCA- likely atherothrombotic- evaluated by [facility] who didn't think it needs any intervention and likely old. - TTE unremarkable. - Neuro following, needs evaluation by ophthalmology, they can't bring equipment into hospital, hence requested he follows up op closely with them. - c/w Statin 'high intensity', aspirin.      #. Orthostatic hypotension: dropped by 20 points, and felt dizzy. Now stable. - was initially Suspect Seizure: pt had 3 episodes of unresponsiveness, gazing, losing tone. No clear convulsion.  - repeat CT head: NAD.  PRN ativan iv  - EEG: slowing on R side,   - re-consult to neurology- Keppra for 1 month. - outpatient follow up      #. CAD: s/p CABG. stable, home regimen aspirin, coreg, statin    #. Diastolic CHF: stable, continue aspirn, coreg, lisinopril, lasix, statin, aldactone     #. HTN: chronic, stable, Home regimen coreg, lisinopril     #. HLD: chronic, Stable, statin     #. DM2: A1c 7.9, SSI, Novolin R 10U BID,  Metformin     #. COPD: no exacerbation,  DuoNebs PRN. #. Chronic nicotine dependence: counseled on health risks of nicotine. Nicotine patch daily    #. Marijuana abuse: UDS +ve. Pt counseled on harmful effects. Encouraged to quit. Monitor     #. PEs: chronic- continue Eliquis.     Signed:   Radha Lawrence MD  11/9/2021

## 2021-11-09 NOTE — DISCHARGE SUMMARY
Discharge Summary     Patient:  Miryam Umana       MRN: [id number]       YOB: 1950       Age: 80 y.o. Date of admission:  10/29/2021    Date of discharge:  11/9/2021    Primary care provider: Dr. Topher Dupree NP    Admitting provider:  Audrey Mckeon MD    Discharging provider:  Angela Murdock MD - [phone]  If unavailable, call [phone] and ask the  to page the triage hospitalist.    Consultations  · IP CONSULT TO NEUROLOGY  · IP CONSULT TO NEUROINTERVENTIONAL SURGERY  · IP CONSULT TO OPHTHALMOLOGY    Procedures  · * No surgery found *    Discharge destination: SNF. The patient is stable for discharge. Admission diagnosis  · Acute CVA (cerebrovascular accident) [facility]) [I63.9]    Current Discharge Medication List      START taking these medications    Details   levETIRAcetam (Keppra) 500 mg tablet Take 1 Tablet by mouth two (2) times a day for 30 days. Qty: 60 Tablet, Refills: 0  Start date: 11/3/2021, End date: 12/3/2021         CONTINUE these medications which have CHANGED    Details   aspirin 81 mg chewable tablet Take 1 Tablet by mouth daily for 30 days. Qty: 30 Tablet, Refills: 0  Start date: 11/3/2021, End date: 12/3/2021    Associated Diagnoses: Coronary artery disease without angina pectoris, unspecified vessel or lesion type, unspecified whether native or transplanted heart      apixaban (ELIQUIS) 5 mg tablet Take 1 Tablet by mouth two (2) times a day for 30 days. Qty: 60 Tablet, Refills: 0  Start date: 11/3/2021, End date: 12/3/2021    Associated Diagnoses: Acute pulmonary embolism, unspecified pulmonary embolism type, unspecified whether acute cor pulmonale present (HCC)      carvediloL (COREG) 6.25 mg tablet Take 1 Tablet by mouth two (2) times daily (with meals) for 30 days.   Qty: 60 Tablet, Refills: 0  Start date: 11/3/2021, End date: 12/3/2021      !! atorvastatin (LIPITOR) 40 mg tablet Take 2 Tablets by mouth daily for 30 days. Qty: 60 Tablet, Refills: 0  Start date: 11/3/2021, End date: 12/3/2021       !! - Potential duplicate medications found. Please discuss with provider. CONTINUE these medications which have NOT CHANGED    Details   insulin glargine (Lantus Solostar U-100 Insulin) 100 unit/mL (3 mL) inpn 30 Units by SubCUTAneous route daily. Qty: 3 Pen, Refills: 0    Associated Diagnoses: Type 2 diabetes mellitus treated with insulin ([facility])      omeprazole (PRILOSEC) 40 mg capsule Take 1 Capsule by mouth daily. Qty: 30 Capsule, Refills: 0    Associated Diagnoses: Gastroesophageal reflux disease without esophagitis      lisinopriL (PRINIVIL, ZESTRIL) 10 mg tablet Take 1 Tablet by mouth daily. Qty: 30 Tablet, Refills: 0    Comments: Needs office visit      spironolactone (ALDACTONE) 25 mg tablet Take 1 Tablet by mouth daily. Qty: 30 Tablet, Refills: 0    Comments: Needs office visit      amitriptyline (ELAVIL) 25 mg tablet Take  by mouth nightly. nicotine (NICODERM CQ) 21 mg/24 hr 1 Patch by TransDERmal route every twenty-four (24) hours. cyclobenzaprine (FLEXERIL) 10 mg tablet TAKE 1 TABLET BY MOUTH THREE TIMES DAILY AS NEEDED FOR MUSCLE SPASMS      !! methocarbamoL (Robaxin-750) 750 mg tablet Take 1 Tablet by mouth three (3) times daily. Qty: 15 Tablet, Refills: 0      lidocaine (Lidoderm) 5 % Apply patch to the affected area for 12 hours a day and remove for 12 hours a day. Qty: 5 Each, Refills: 0      acetaminophen (TYLENOL) 325 mg tablet TAKE 3 TABLETS BY MOUTH EVERY 8 HOURS FOR 14 DAYS. (GENERIC FOR TYLENOL)      ! ! methocarbamoL (ROBAXIN) 500 mg tablet TAKE ONE TABLET BY MOUTH TWICE A DAY      polyethylene glycol (MIRALAX) 17 gram/dose powder MIX 17 GRAMS (1 CAPFUL) IN 4 TO 8 OUNCES OF LIQUID AND DRINK ONCE DAILY.       !! tamsulosin (FLOMAX) 0.4 mg capsule 1 cap(s)      Blood-Glucose Meter monitoring kit Patient with Type II DM needs glucometer for blood glucose monitoring. DX code E11.9  Qty: 1 Kit, Refills: 0    Associated Diagnoses: Type II diabetes mellitus with complication (HCC)      glucose blood VI test strips (blood glucose test) strip Please measure blood glucose 2 times per day. DX code: E11.9  Qty: 100 Strip, Refills: 2    Associated Diagnoses: Type II diabetes mellitus with complication ([address]      lancets misc Please measure blood glucose 2 times per day. DX Code E11.9  Qty: 1 Each, Refills: 2    Associated Diagnoses: Type II diabetes mellitus with complication ([address]      multivit-min/FA/lycopen/lutein (CENTRUM SILVER MEN PO) Take  by mouth. albuterol (PROVENTIL HFA, VENTOLIN HFA, PROAIR HFA) 90 mcg/actuation inhaler Take 2 Puffs by inhalation every four (4) hours as needed for Wheezing for up to 360 days. Qty: 1 Inhaler, Refills: 2    Associated Diagnoses: History of asthma      Insulin Needles, Disposable, (Comfort EZ Pen Needles) 32 gauge x 5/32\" ndle E11.6 use for insulin SQ injection as directed  Qty: 100 Pen Needle, Refills: 2    Associated Diagnoses: Type 2 diabetes mellitus treated with insulin ([facility])      Insulin Syringe-Needle U-100 0.5 mL 30 gauge x 5/16\" syrg Use as directed BID  Qty: 200 Syringe, Refills: 3      insulin regular (NOVOLIN R, HUMULIN R) 100 unit/mL injection 10 Units by SubCUTAneous route two (2) times a day. Qty: 3 Vial, Refills: 2    Associated Diagnoses: Type 2 diabetes mellitus treated with insulin ([facility])      isosorbide-hydrALAZINE (BiDiL) 20-37.5 mg per tablet Take 1 Tab by mouth three (3) times daily. Qty: 80 Tab, Refills: 0    Associated Diagnoses: Coronary artery disease without angina pectoris, unspecified vessel or lesion type, unspecified whether native or transplanted heart      !! atorvastatin (LIPITOR) 40 mg tablet Take 1 Tab by mouth daily.   Qty: 30 Tab, Refills: 0    Associated Diagnoses: Coronary artery disease without angina pectoris, unspecified vessel or lesion type, unspecified whether native or transplanted heart      hydroCHLOROthiazide (HYDRODIURIL) 25 mg tablet Take 1 Tab by mouth daily. Qty: 30 Tab, Refills: 0    Associated Diagnoses: Essential hypertension      metFORMIN (GLUCOPHAGE) 1,000 mg tablet Take 1 Tab by mouth two (2) times daily (with meals). Qty: 60 Tab, Refills: 0    Associated Diagnoses: Type 2 diabetes mellitus treated with insulin ([address]      ! ! tamsulosin (FLOMAX) 0.4 mg capsule Take 1 Cap by mouth daily. Qty: 30 Cap, Refills: 0      furosemide (LASIX) 40 mg tablet Take 1 Tab by mouth daily. Qty: 30 Tab, Refills: 0    Associated Diagnoses: Essential hypertension      ergocalciferol (ERGOCALCIFEROL) 1,250 mcg (50,000 unit) capsule TK 1 C PO 1 TIME A WK       !! - Potential duplicate medications found. Please discuss with provider. Follow-up Information     Follow up With Specialties Details Why Contact Info    Lopez Huffman NP Nurse Practitioner On 11/8/2021 Hospital follow up PCP appointment Monday, 11/8/21 at 10:30 a.m.  [address] [facility]  [phone]      Kayla Alcocer MD Neurology In 1 month  [address] [facility]  [phone]            Final discharge diagnoses and brief hospital course  Please also refer to the admission H&P for details on the presenting problem. 71M w/ CAD s/p CABG, HTN, CHF, DM, dyslipidemia, DVT on Eliquis, COPD, who presented with weakness and was found to have an acute occipital infarcts.     #. Acute CVAs:   - CT head: foci of acute/subacute infarcts in R- occipital area. - CTA head/neck: No sig stenosis  - , A1c 7.9.   - MRI: R PCA territory likely subacute infarcts, occluded R PCA- likely atherothrombotic- evaluated by [facility] who didn't think it needs any intervention and likely old. - TTE unremarkable.   - Neuro following, needs evaluation by ophthalmology, they can't bring equipment into hospital, hence requested he follows up op closely with them. - c/w Statin 'high intensity', aspirin.      #. Orthostatic hypotension: dropped by 20 points, and felt dizzy. Now stable. - was initially Suspect Seizure: pt had 3 episodes of unresponsiveness, gazing, losing tone. No clear convulsion.  - repeat CT head: NAD. PRN ativan iv  - EEG: slowing on R side,   - re-consult to neurology- Keppra for 1 month. - outpatient follow up      #. CAD: s/p CABG. stable, home regimen aspirin, coreg, statin     #. Diastolic CHF: stable, continue aspirn, coreg, lisinopril, lasix, statin, aldactone      #. HTN: chronic, stable, Home regimen coreg, lisinopril      #. HLD: chronic, Stable, statin      #. DM2: A1c 7.9, Novolin R 10U BID,  Metformin      #. COPD: no exacerbation,  DuoNebs PRN.     #. Chronic nicotine dependence: counseled on health risks of nicotine. Nicotine patch daily     #. Marijuana abuse: UDS +ve. Pt counseled on harmful effects. Encouraged to quit. Monitor      #. PEs: chronic- continue Eliquis.       Follow-up Information     Follow up With Specialties Details Why Contact Info    Neha Vieira NP Nurse Practitioner On 11/8/2021 Hospital follow up PCP appointment Monday, 11/8/21 at 10:30 a.m.  [address] [facility]      Horace Hutson MD Neurology In 1 month  [facility] [address] [facility] [address]  [phone]              Physical examination at discharge  Visit Vitals  /78 (BP 1 Location: Right upper arm, BP Patient Position: At rest)   Pulse 84   Temp 97.5 °F (36.4 °C)   Resp 17   Ht 6' (1.829 m)   Wt 104 kg (229 lb 4.5 oz)   SpO2 95%   BMI 31.10 kg/m²     AO3  Left homonomous kennedy  Motor 5/5  Sensory intact  Lung clear  CVS: RRR  Abd: soft NT ND   Ext: No edema    Pertinent imaging studies:    IMPRESSION  CT Head:  1. Suspected acute/subacute infarction of the right occipital lobe.      CTA Head:  1.  Occlusion of the right PCA beginning at the origin with a few areas of  intermittent vascularity in the distal P2.  2. No flow-limiting stenosis or large vessel occlusion in the anterior  circulation.     CTA Neck:  1. No large vessel occlusion or significant flow-limiting stenosis. MRI Brain    1. Late acute versus subacute infarcts along the right PCA territory involve the  basal ganglia, thalamus, corpus callosum, and occipital lobe. Evidence of  associated petechial hemorrhage. 2. Extensive chronic microvascular ischemic disease. ECHO  Result status: Final result  · Saline contrast was given to evaluate for intracardiac shunt. No shunt seen. · LV: Estimated LVEF is 60 - 65%. Normal cavity size, wall thickness and systolic function (ejection fraction normal). Wall motion: normal.           No results for input(s): WBC, HGB, HCT, PLT, HGBEXT, HCTEXT, PLTEXT in the last 72 hours. No results for input(s): NA, K, CL, CO2, BUN, CREA, GLU, CA, MG, PHOS, URICA in the last 72 hours. No results for input(s): AP, TBIL, TP, ALB, GLOB, GGT, AML, LPSE in the last 72 hours. No lab exists for component: SGOT, GPT, AMYP, HLPSE  No results for input(s): INR, PTP, APTT, INREXT in the last 72 hours. No results for input(s): FE, TIBC, PSAT, FERR in the last 72 hours. No results for input(s): PH, PCO2, PO2 in the last 72 hours. No results for input(s): CPK, CKMB in the last 72 hours.     No lab exists for component: TROPONINI  No components found for: [place]    Chronic Diagnoses:    Problem List as of 11/9/2021 Date Reviewed: 10/29/2021          Codes Class Noted - Resolved    * (Principal) Acute CVA (cerebrovascular accident) [facility]) ICD-10-CM: I63.9  ICD-9-CM: 434.91  10/29/2021 - Present        Coronary artery disease ICD-10-CM: I25.10  ICD-9-CM: 414.00  1/2/2019 - Present        Near syncope ICD-10-CM: R55  ICD-9-CM: 780.2  1/2/2019 - Present        Dyspnea on exertion ICD-10-CM: R06.00  ICD-9-CM: 786.09  1/2/2019 - Present              Time spent on discharge related activities today greater than 30 minutes.       Signed:  Kishan iHnojosa MD                 Hospitalist, Internal Medicine      Cc: Colin Del Valle NP

## 2021-11-09 NOTE — PROGRESS NOTES
I have reviewed discharge instructions with the patient. The patient verbalized understanding.       Stroke Education documented in Patient Education: YES  Core Measures Documented in Connect [facility]:  Risk Factors: YES  Warning signs of stroke: YES  When to Activate 911: YES  Medication Education for Risk Factors: YES  Smoking cessation if applicable: YES  Written Education Given:  YES    Discharge NIH Completed: YES  Score: 1    BRAINS: YES    Follow Up Appointment Made: NO  Date/Time if applicable: patient to make appointment

## 2021-11-09 NOTE — PROGRESS NOTES
Problem: Falls - Risk of  Goal: *Absence of Falls  Description: Document [place] Fall Risk and appropriate interventions in the flowsheet.   Outcome: Progressing Towards Goal  Note: Fall Risk Interventions:  Mobility Interventions: Bed/chair exit alarm, Communicate number of staff needed for ambulation/transfer, Patient to call before getting OOB, PT Consult for mobility concerns         Medication Interventions: Bed/chair exit alarm, Evaluate medications/consider consulting pharmacy, Patient to call before getting OOB    Elimination Interventions: Bed/chair exit alarm, Call light in reach, Toileting schedule/hourly rounds              Problem: TIA/CVA Stroke: Day 2 Until Discharge  Goal: Diagnostic Test/Procedures  Outcome: Progressing Towards Goal  Goal: Nutrition/Diet  Outcome: Progressing Towards Goal  Goal: Discharge Planning  Outcome: Progressing Towards Goal  Goal: Treatments/Interventions/Procedures  Outcome: Progressing Towards Goal  Goal: Psychosocial  Outcome: Progressing Towards Goal

## 2021-11-11 ENCOUNTER — EXTERNAL NURSING HOME DOCUMENTATION (OUTPATIENT)
Dept: INTERNAL MEDICINE CLINIC | Age: 71
End: 2021-11-11
Payer: MEDICAID

## 2021-11-11 DIAGNOSIS — E11.9 TYPE 2 DIABETES MELLITUS WITHOUT COMPLICATION, WITH LONG-TERM CURRENT USE OF INSULIN (HCC): ICD-10-CM

## 2021-11-11 DIAGNOSIS — I10 ESSENTIAL HYPERTENSION: ICD-10-CM

## 2021-11-11 DIAGNOSIS — E78.00 HYPERCHOLESTEROLEMIA: ICD-10-CM

## 2021-11-11 DIAGNOSIS — I95.1 ORTHOSTATIC HYPOTENSION: ICD-10-CM

## 2021-11-11 DIAGNOSIS — I25.700 CORONARY ARTERY DISEASE INVOLVING CORONARY BYPASS GRAFT OF NATIVE HEART WITH UNSTABLE ANGINA PECTORIS (HCC): ICD-10-CM

## 2021-11-11 DIAGNOSIS — I50.32 DIASTOLIC CHF, CHRONIC (HCC): ICD-10-CM

## 2021-11-11 DIAGNOSIS — Z86.711 HISTORY OF PULMONARY EMBOLUS (PE): ICD-10-CM

## 2021-11-11 DIAGNOSIS — I63.9 ACUTE CVA (CEREBROVASCULAR ACCIDENT) (HCC): Primary | ICD-10-CM

## 2021-11-11 DIAGNOSIS — F19.11 HISTORY OF SUBSTANCE ABUSE (HCC): ICD-10-CM

## 2021-11-11 DIAGNOSIS — Z79.4 TYPE 2 DIABETES MELLITUS WITHOUT COMPLICATION, WITH LONG-TERM CURRENT USE OF INSULIN (HCC): ICD-10-CM

## 2021-11-11 DIAGNOSIS — J44.9 CHRONIC OBSTRUCTIVE PULMONARY DISEASE, UNSPECIFIED COPD TYPE (HCC): ICD-10-CM

## 2021-11-11 PROCEDURE — 99306 1ST NF CARE HIGH MDM 50: CPT | Performed by: INTERNAL MEDICINE

## 2021-11-12 ENCOUNTER — OFFICE VISIT (OUTPATIENT)
Dept: INTERNAL MEDICINE CLINIC | Age: 71
End: 2021-11-12
Payer: MEDICAID

## 2021-11-12 DIAGNOSIS — Z86.73 HISTORY OF CVA (CEREBROVASCULAR ACCIDENT): ICD-10-CM

## 2021-11-12 DIAGNOSIS — I50.9 CONGESTIVE HEART FAILURE, UNSPECIFIED HF CHRONICITY, UNSPECIFIED HEART FAILURE TYPE (HCC): ICD-10-CM

## 2021-11-12 DIAGNOSIS — I95.1 ORTHOSTATIC HYPOTENSION: Primary | ICD-10-CM

## 2021-11-12 DIAGNOSIS — Z79.4 TYPE 2 DIABETES MELLITUS WITH OTHER SPECIFIED COMPLICATION, WITH LONG-TERM CURRENT USE OF INSULIN (HCC): ICD-10-CM

## 2021-11-12 DIAGNOSIS — I25.119 CORONARY ARTERY DISEASE WITH ANGINA PECTORIS, UNSPECIFIED VESSEL OR LESION TYPE, UNSPECIFIED WHETHER NATIVE OR TRANSPLANTED HEART (HCC): ICD-10-CM

## 2021-11-12 DIAGNOSIS — I10 ESSENTIAL HYPERTENSION: ICD-10-CM

## 2021-11-12 DIAGNOSIS — E11.69 TYPE 2 DIABETES MELLITUS WITH OTHER SPECIFIED COMPLICATION, WITH LONG-TERM CURRENT USE OF INSULIN (HCC): ICD-10-CM

## 2021-11-12 DIAGNOSIS — Z86.718 HISTORY OF DVT (DEEP VEIN THROMBOSIS): ICD-10-CM

## 2021-11-12 DIAGNOSIS — E78.5 DYSLIPIDEMIA: ICD-10-CM

## 2021-11-12 DIAGNOSIS — J44.9 CHRONIC OBSTRUCTIVE PULMONARY DISEASE, UNSPECIFIED COPD TYPE (HCC): ICD-10-CM

## 2021-11-12 PROBLEM — E11.9 TYPE 2 DIABETES MELLITUS (HCC): Status: ACTIVE | Noted: 2021-11-12

## 2021-11-12 PROCEDURE — 99309 SBSQ NF CARE MODERATE MDM 30: CPT | Performed by: NURSE PRACTITIONER

## 2021-11-12 NOTE — PROGRESS NOTES
THIS IS NOT A COMPLETE MEDICAL RECORD ON THIS PATIENT. THIS IS A PATIENT AT [facility]. PLEASE CONTACT THE FACILITY LISTED FOR MORE INFORMATION ON THIS PATIENT. THE COMPLETE RECORD RESIDES WITH THIS LONG TERM CARE FACILITY. HISTORY OF PRESENT ILLNESS  Joie Torres is a 80 y.o. male. This patient is currently under the care of Dr. Chito Harry at [facility]. The patient was admitted to this facility following hospitalization related to CVA, suspected seizure, and orthostatic hypotension. His past medical history includes CAD s/p CABG, HTN, CHF, DM, dyslipidemia, DVT/PE on Eliquis, COPD, nicotine dependence, and marijuana abuse.   The patient is seen today per nursing request.  The patient reports that he had a fall yesterday evening. He states he became dizzy while walking back from the scale in the hallway. He says he grabbed onto the handrail in the hallway and went to the floor. He says two people helped him back up to his room. However, nursing did not witness of receive any report of a fall. Patient denies injury, aside from an abrasion on his knee, that does not appear recent per nursing. Patient says he is feeling well at time of visit. 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 He states he has been able to participate in PT/OT without dizziness. HPI    Review of Systems   Constitutional: Negative for chills and fever. HENT: Negative for congestion. Respiratory: Negative for cough and shortness of breath. Cardiovascular: Negative for chest pain. Gastrointestinal: Negative. Genitourinary: Negative. Musculoskeletal: Positive for back pain and falls. Neurological: Negative for dizziness and headaches. Endo/Heme/Allergies: Negative. Psychiatric/Behavioral: Negative. Physical Exam  Constitutional:       General: He is not in acute distress. HENT:      Head: Normocephalic and atraumatic. Nose: No congestion.    Eyes: Conjunctiva/sclera: Conjunctivae normal.   Cardiovascular:      Rate and Rhythm: Normal rate and regular rhythm. Pulmonary:      Effort: Pulmonary effort is normal.      Breath sounds: Normal breath sounds. Abdominal:      General: Bowel sounds are normal.      Palpations: Abdomen is soft. Musculoskeletal:      Right lower leg: No edema. Left lower leg: No edema. Skin:     General: Skin is warm and dry. Comments: Healing abrasion right anterior knee, without surrounding erythema or drainage   Neurological:      Mental Status: He is alert. Comments: Oriented to self and answering simple questions appropriately   Psychiatric:         Mood and Affect: Mood normal.         ASSESSMENT and PLAN  Encounter Diagnoses   Name Primary?  Orthostatic hypotension Yes    History of CVA (cerebrovascular accident)     Type 2 diabetes mellitus with other specified complication, with long-term current use of insulin ([address]     Essential hypertension     Coronary artery disease with angina pectoris, unspecified vessel or lesion type, unspecified whether native or transplanted heart (HCC)     Congestive heart failure, unspecified HF chronicity, unspecified heart failure type ([address]     Dyslipidemia     History of DVT (deep vein thrombosis)     Chronic obstructive pulmonary disease, unspecified COPD type ([address]      Fall precautions. Continue PT/OT as tolerated. Reviewed medications and side effects in detail. Continue current medications at this time. Nursing to continue to monitor closely and notify MD/NP of any change in condition.

## 2021-11-22 ENCOUNTER — OFFICE VISIT (OUTPATIENT)
Dept: INTERNAL MEDICINE CLINIC | Age: 71
End: 2021-11-22
Payer: MEDICAID

## 2021-11-22 DIAGNOSIS — E78.5 DYSLIPIDEMIA: ICD-10-CM

## 2021-11-22 DIAGNOSIS — I25.119 CORONARY ARTERY DISEASE WITH ANGINA PECTORIS, UNSPECIFIED VESSEL OR LESION TYPE, UNSPECIFIED WHETHER NATIVE OR TRANSPLANTED HEART (HCC): ICD-10-CM

## 2021-11-22 DIAGNOSIS — E11.69 TYPE 2 DIABETES MELLITUS WITH OTHER SPECIFIED COMPLICATION, WITH LONG-TERM CURRENT USE OF INSULIN (HCC): ICD-10-CM

## 2021-11-22 DIAGNOSIS — I50.9 CONGESTIVE HEART FAILURE, UNSPECIFIED HF CHRONICITY, UNSPECIFIED HEART FAILURE TYPE (HCC): ICD-10-CM

## 2021-11-22 DIAGNOSIS — I10 ESSENTIAL HYPERTENSION: ICD-10-CM

## 2021-11-22 DIAGNOSIS — J44.9 CHRONIC OBSTRUCTIVE PULMONARY DISEASE, UNSPECIFIED COPD TYPE (HCC): ICD-10-CM

## 2021-11-22 DIAGNOSIS — Z86.73 HISTORY OF CVA (CEREBROVASCULAR ACCIDENT): Primary | ICD-10-CM

## 2021-11-22 DIAGNOSIS — Z79.4 TYPE 2 DIABETES MELLITUS WITH OTHER SPECIFIED COMPLICATION, WITH LONG-TERM CURRENT USE OF INSULIN (HCC): ICD-10-CM

## 2021-11-22 PROCEDURE — 99309 SBSQ NF CARE MODERATE MDM 30: CPT | Performed by: NURSE PRACTITIONER

## 2021-11-22 NOTE — PROGRESS NOTES
THIS IS NOT A COMPLETE MEDICAL RECORD ON THIS PATIENT. THIS IS A PATIENT AT [facility]. PLEASE CONTACT THE FACILITY LISTED FOR MORE INFORMATION ON THIS PATIENT. THE COMPLETE RECORD RESIDES WITH THIS LONG TERM CARE FACILITY. HISTORY OF PRESENT ILLNESS  Derek Wyatt is a 80 y.o. male. This patient is currently under the care of Dr. Sommer Murcia at [facility]. The patient was admitted to this facility following hospitalization related to CVA, suspected seizure, and orthostatic hypotension. His past medical history includes CAD s/p CABG, HTN, CHF, DM, dyslipidemia, DVT/PE on Eliquis, COPD, nicotine dependence, and marijuana abuse.   The patient is seen today for follow-up and lab review. Labs collected 11/19/21:  Na-136, K- 4.2, BUN-17, Creatinine-0.75, HgbA1c- 7.1. Patient says he is feeling well at time of visit, though tired as he did not sleep well overnight. HPI    Review of Systems   Constitutional: Negative for chills and fever. HENT: Negative for congestion. Respiratory: Negative for cough and shortness of breath. Cardiovascular: Negative for chest pain. Gastrointestinal: Negative. Genitourinary: Negative. Musculoskeletal: Negative. Neurological: Negative for dizziness and headaches. Endo/Heme/Allergies: Negative. Psychiatric/Behavioral: The patient has insomnia. Physical Exam  Constitutional:       General: He is not in acute distress. HENT:      Head: Normocephalic and atraumatic. Nose: No congestion. Eyes:      Conjunctiva/sclera: Conjunctivae normal.   Cardiovascular:      Rate and Rhythm: Normal rate and regular rhythm. Pulmonary:      Effort: Pulmonary effort is normal.      Breath sounds: Normal breath sounds. Abdominal:      General: Bowel sounds are normal.      Palpations: Abdomen is soft. Musculoskeletal:      Right lower leg: No edema. Left lower leg: No edema. Skin:     General: Skin is warm and dry.    Neurological: Mental Status: He is alert. Comments: Oriented to self and answering simple questions appropriately   Psychiatric:         Mood and Affect: Mood normal.         ASSESSMENT and PLAN  Encounter Diagnoses   Name Primary?  History of CVA (cerebrovascular accident) Yes    Type 2 diabetes mellitus with other specified complication, with long-term current use of insulin (HCC)     Essential hypertension     Coronary artery disease with angina pectoris, unspecified vessel or lesion type, unspecified whether native or transplanted heart (HCC)     Congestive heart failure, unspecified HF chronicity, unspecified heart failure type ([facility]     Dyslipidemia     Chronic obstructive pulmonary disease, unspecified COPD type ([facility]      Continue PT/OT/ST as tolerated. Reviewed medications and side effects in detail. Continue current medications at this time. Nursing to continue to monitor closely and notify MD/NP of any change in condition.

## 2021-11-29 ENCOUNTER — OFFICE VISIT (OUTPATIENT)
Dept: INTERNAL MEDICINE CLINIC | Age: 71
End: 2021-11-29
Payer: MEDICAID

## 2021-11-29 DIAGNOSIS — E11.69 TYPE 2 DIABETES MELLITUS WITH OTHER SPECIFIED COMPLICATION, WITH LONG-TERM CURRENT USE OF INSULIN (HCC): Primary | ICD-10-CM

## 2021-11-29 DIAGNOSIS — I25.119 CORONARY ARTERY DISEASE WITH ANGINA PECTORIS, UNSPECIFIED VESSEL OR LESION TYPE, UNSPECIFIED WHETHER NATIVE OR TRANSPLANTED HEART (HCC): ICD-10-CM

## 2021-11-29 DIAGNOSIS — Z86.73 HISTORY OF CVA (CEREBROVASCULAR ACCIDENT): ICD-10-CM

## 2021-11-29 DIAGNOSIS — J44.9 CHRONIC OBSTRUCTIVE PULMONARY DISEASE, UNSPECIFIED COPD TYPE (HCC): ICD-10-CM

## 2021-11-29 DIAGNOSIS — E78.5 DYSLIPIDEMIA: ICD-10-CM

## 2021-11-29 DIAGNOSIS — Z79.4 TYPE 2 DIABETES MELLITUS WITH OTHER SPECIFIED COMPLICATION, WITH LONG-TERM CURRENT USE OF INSULIN (HCC): Primary | ICD-10-CM

## 2021-11-29 DIAGNOSIS — I10 ESSENTIAL HYPERTENSION: ICD-10-CM

## 2021-11-29 DIAGNOSIS — I50.9 CONGESTIVE HEART FAILURE, UNSPECIFIED HF CHRONICITY, UNSPECIFIED HEART FAILURE TYPE (HCC): ICD-10-CM

## 2021-11-29 PROCEDURE — 99309 SBSQ NF CARE MODERATE MDM 30: CPT | Performed by: NURSE PRACTITIONER

## 2021-11-29 NOTE — PROGRESS NOTES
THIS IS NOT A COMPLETE MEDICAL RECORD ON THIS PATIENT. THIS IS A PATIENT AT [facility]. PLEASE CONTACT THE FACILITY LISTED FOR MORE INFORMATION ON THIS PATIENT. THE COMPLETE RECORD RESIDES WITH THIS LONG TERM CARE FACILITY. HISTORY OF PRESENT ILLNESS  Tete Muro is a 80 y.o. male. This patient is currently under the care of Dr. Hilary George at [facility]  The patient was admitted to this facility following hospitalization related to CVA, suspected seizure, and orthostatic hypotension. His past medical history includes CAD s/p CABG, HTN, CHF, DM, dyslipidemia, DVT/PE on Eliquis, COPD, nicotine dependence, and marijuana abuse. Patient is seen per nursing request for review of recent blood sugars. Nursing expresses concern of risk of hypoglycemia. Patient's blood sugars are often in the low 100s. Patient denies symptoms of hypoglycemia at this time. Current diabetic medications include Lantus 30 units sq nightly, Novolin R 10 units bid, and metformin 1000 mg po bid. Labs collected 11/19/21:  Na-136, K- 4.2, BUN-17, Creatinine-0.75, HgbA1c- 7.1. HPI    Review of Systems   Constitutional: Negative for chills and fever. HENT: Negative for congestion. Respiratory: Negative for cough and shortness of breath. Cardiovascular: Negative for chest pain. Gastrointestinal: Negative. Genitourinary: Negative. Musculoskeletal: Negative. Neurological: Negative for dizziness and headaches. Endo/Heme/Allergies: Negative. Psychiatric/Behavioral: Negative. Physical Exam  Constitutional:       General: He is not in acute distress. HENT:      Head: Normocephalic and atraumatic. Nose: No congestion. Eyes:      Conjunctiva/sclera: Conjunctivae normal.   Cardiovascular:      Rate and Rhythm: Normal rate and regular rhythm. Pulmonary:      Effort: Pulmonary effort is normal.      Breath sounds: Normal breath sounds.    Abdominal:      General: Bowel sounds are normal.      Palpations: Abdomen is soft. Musculoskeletal:      Right lower leg: No edema. Left lower leg: No edema. Skin:     General: Skin is warm and dry. Neurological:      Mental Status: He is alert. Comments: Answering simple questions appropriately   Psychiatric:         Mood and Affect: Mood normal.         ASSESSMENT and PLAN  Encounter Diagnoses   Name Primary?  Type 2 diabetes mellitus with other specified complication, with long-term current use of insulin (HCC) Yes    History of CVA (cerebrovascular accident)     Essential hypertension     Coronary artery disease with angina pectoris, unspecified vessel or lesion type, unspecified whether native or transplanted heart (HCC)     Congestive heart failure, unspecified HF chronicity, unspecified heart failure type ([address]     Dyslipidemia     Chronic obstructive pulmonary disease, unspecified COPD type ([facility]      May hold Novolin R if blood sugar < 200. Reduce Lantus to 25 units sq qhs. Continue to monitor blood sugar ACHS and notify if <60 or >350. Continue PT/OT as tolerated. Reviewed medications and side effects in detail. Nursing to continue to monitor closely and notify MD/NP of any change in condition.

## 2021-11-30 ENCOUNTER — TELEPHONE (OUTPATIENT)
Dept: NEUROLOGY | Age: 71
End: 2021-11-30

## 2021-11-30 NOTE — TELEPHONE ENCOUNTER
Please advise    ----- Message from [place] sent at 11/30/2021  3:01 PM EST -----  Regarding: /telephone  Appointment not available    Caller's first and last name and relationship to patient (if not the patient): Carmita Quintana with [facility], [address] [facility] [place] contact number: [phone]      Preferred date and time: Next Available      Scheduled appointment date and time: 02/15/22 10am      Reason for appointment: Hospital F/UP      Details to clarify the request: PT was seen in [place] [address] by Jaguar Cartwright on 10/29/21 and discharged 10/29/21 for suspected seizures and needs a consult, needs a f/up appt within a month.       Patricio

## 2021-12-09 ENCOUNTER — APPOINTMENT (OUTPATIENT)
Dept: INTERNAL MEDICINE CLINIC | Age: 71
End: 2021-12-09

## 2021-12-10 NOTE — TELEPHONE ENCOUNTER
[place] [facility] [place] called back, and advised per Dr. Marcel Bergeron response.

## 2022-01-10 VITALS — RESPIRATION RATE: 14 BRPM

## 2022-01-10 PROBLEM — E78.00 HYPERCHOLESTEROLEMIA: Status: ACTIVE | Noted: 2022-01-10

## 2022-01-10 PROBLEM — Z79.4 TYPE 2 DIABETES MELLITUS WITHOUT COMPLICATION, WITH LONG-TERM CURRENT USE OF INSULIN (HCC): Status: ACTIVE | Noted: 2021-11-12

## 2022-01-10 PROBLEM — I95.1 ORTHOSTATIC HYPOTENSION: Status: ACTIVE | Noted: 2022-01-10

## 2022-01-10 PROBLEM — F19.11 HISTORY OF SUBSTANCE ABUSE (HCC): Status: ACTIVE | Noted: 2022-01-10

## 2022-01-10 PROBLEM — Z86.711 HISTORY OF PULMONARY EMBOLUS (PE): Status: ACTIVE | Noted: 2022-01-10

## 2022-01-10 PROBLEM — I50.32 DIASTOLIC CHF, CHRONIC (HCC): Status: ACTIVE | Noted: 2022-01-10

## 2022-01-10 PROBLEM — I10 ESSENTIAL HYPERTENSION: Status: ACTIVE | Noted: 2022-01-10

## 2022-01-10 PROBLEM — J44.9 CHRONIC OBSTRUCTIVE PULMONARY DISEASE, UNSPECIFIED COPD TYPE (HCC): Status: ACTIVE | Noted: 2022-01-10

## 2022-01-10 NOTE — PROGRESS NOTES
THIS IS NOT A COMPLETE MEDICAL RECORD ON THIS PATIENT.    THIS IS A PATIENT AT [facility]- [address] LISTED BELOW FOR MORE INFORMATION ON THIS PATIENT.    THE COMPLETE RECORD RESIDES WITH THIS LONG TERM CARE FACILITY      Admission history and physical      HPI: Merlin Infield is a 80year-old  who was admitted to [facility]-  after hospitalization with right occipital CVA and orthostatic hypotension. I reviewed records from the hospital.  PMH includes CAD with CABG, diastolic CHF, HTN, HLD, DM, COPD, DVT/PE on Eliquis. Reports some weakness otherwise no other acute complaints. Denies focal neurological issues, headache, chest pain, dyspnea. Had orthostatic hypotension in the hospital.  Denies dizziness. Also had some vision issues but denies acute issues today. Staff do not report any significant issues either. PMH:See HPI  Allergies: NKA  SH: Former smoker. Denies alcohol use. FH: Noncontributory  Medications: NH med list reviewed and signed  Labs: Reviewed. A1c 6.5. . Rest of labs are stable.     Exam:  Vital signs stable, afebrile, NAD, in bed, answers questions properly   HEENT: Some decreased peripheral vision otherwise unremarkable  Neck: Supple without JVD, bruits or thyromegaly  Heart: Regular without MRG  Lungs: Diminished sounds but overall clear  Abdomen: Soft, nontender, normal bowel sounds  Extremities: No edema, DJD changes  Neuro: Generalized weakness otherwise grossly nonfocal  Skin: Intact    Impression:  Right occipital CVA  Orthostatic hypotension  CAD with history of CABG  Diastolic CHF  HTN  DM  HLD  COPD  History of PE/DVT  Substance abuse    Plan:  Continue current meds  Baseline labs  PT, OT, ST  Monitor BP and pulse  Fall precautions  Follow-up with neurologist as scheduled  Overall seems stable at this point  Full code    Olman Tafoya D.O.

## 2022-01-13 ENCOUNTER — APPOINTMENT (OUTPATIENT)
Dept: INTERNAL MEDICINE CLINIC | Age: 72
End: 2022-01-13

## 2022-02-15 ENCOUNTER — OFFICE VISIT (OUTPATIENT)
Dept: NEUROLOGY | Age: 72
End: 2022-02-15
Payer: MEDICAID

## 2022-02-15 VITALS
BODY MASS INDEX: 30.14 KG/M2 | DIASTOLIC BLOOD PRESSURE: 68 MMHG | WEIGHT: 222.5 LBS | SYSTOLIC BLOOD PRESSURE: 118 MMHG | HEIGHT: 72 IN | OXYGEN SATURATION: 99 % | HEART RATE: 79 BPM | RESPIRATION RATE: 18 BRPM

## 2022-02-15 DIAGNOSIS — I69.398 CVA, OLD, HOMONYMOUS HEMIANOPSIA: Primary | ICD-10-CM

## 2022-02-15 DIAGNOSIS — E11.42 DIABETIC POLYNEUROPATHY ASSOCIATED WITH TYPE 2 DIABETES MELLITUS (HCC): ICD-10-CM

## 2022-02-15 DIAGNOSIS — H53.469 CVA, OLD, HOMONYMOUS HEMIANOPSIA: Primary | ICD-10-CM

## 2022-02-15 PROBLEM — F33.9 MAJOR DEPRESSIVE DISORDER, RECURRENT, UNSPECIFIED (HCC): Status: ACTIVE | Noted: 2022-02-15

## 2022-02-15 PROBLEM — F33.0 MAJOR DEPRESSIVE DISORDER, RECURRENT, MILD (HCC): Status: ACTIVE | Noted: 2022-02-15

## 2022-02-15 PROBLEM — F33.1 MAJOR DEPRESSIVE DISORDER, RECURRENT, MODERATE (HCC): Status: ACTIVE | Noted: 2022-02-15

## 2022-02-15 PROCEDURE — 99214 OFFICE O/P EST MOD 30 MIN: CPT | Performed by: PSYCHIATRY & NEUROLOGY

## 2022-02-15 RX ORDER — APIXABAN 5 MG/1
TABLET, FILM COATED ORAL
COMMUNITY
Start: 2021-12-04

## 2022-02-15 RX ORDER — CARVEDILOL 3.12 MG/1
TABLET ORAL
COMMUNITY
Start: 2022-02-03

## 2022-02-15 RX ORDER — DULOXETIN HYDROCHLORIDE 30 MG/1
30 CAPSULE, DELAYED RELEASE ORAL DAILY
Qty: 30 CAPSULE | Refills: 1 | Status: SHIPPED | OUTPATIENT
Start: 2022-02-15

## 2022-02-15 RX ORDER — FUROSEMIDE 20 MG/1
TABLET ORAL
COMMUNITY
Start: 2022-01-30 | End: 2022-02-15

## 2022-02-15 RX ORDER — LEVETIRACETAM 500 MG/1
TABLET ORAL
COMMUNITY
Start: 2022-02-03

## 2022-02-15 NOTE — PROGRESS NOTES
Yazmin Chan is a 80 y.o. male  HIPAA verified by two patient identifiers. Health Maintenance Due   Topic    COVID-19 Vaccine (1)    MICROALBUMIN Q1     Eye Exam Retinal or Dilated     DTaP/Tdap/Td series (1 - Tdap)    Colorectal Cancer Screening Combo     Shingrix Vaccine Age 50> (1 of 2)    Flu Vaccine (1)     Visit Vitals  /68   Pulse 79   Resp 18   Ht 6' (1.829 m)   Wt 222 lb 8 oz (100.9 kg)   SpO2 99%   BMI 30.18 kg/m²       Pain Scale: 8/10  Pain Location: Foot (bilateral numbness)  1. Have you been to the ER, urgent care clinic since your last visit? Hospitalized since your last visit? No    2. Have you seen or consulted any other health care providers outside of the [address] [place] [address] since your last visit? Include any pap smears or colon screening.  No

## 2022-02-15 NOTE — PROGRESS NOTES
Chief Complaint   Patient presents with    Neurologic Problem     referred by [facility] ER after CVA         HISTORY OF PRESENT ILLNESS  Kenrick Fletcher is a 80 y.o. male who was last seen by me in the hospital in the beginning of November 2021 when he was admitted with visual deficits and left-sided weakness. He was found to have acute to subacute appearing infarcts in the right posterior cerebral artery distribution. He had occluded right PCA likely due to underlying atherosclerosis with superimposed thrombosis. Was started on aspirin and statin    He regained his strength back to normal but left-sided homonymous hemianopsia still persists. He continues to live in an assisted living facility and prior to his stroke he was sharing a room with 2 other roommates    He also had an episode of syncope which was suspected to be due to orthostatic hypotension. That has recurred. He took [address] for about a month and not taking it anymore. No reported seizures. At the time of discharge, he was also on apixaban for prior history of pulmonary embolism but he is not on that anymore. He also reports numbness and occasionally pain in his feet. Denies any significant balance issues or falls. He is a diabetic      Past Medical History:   Diagnosis Date    CAD (coronary artery disease)     Diabetes ([facility] [address]     GERD (gastroesophageal reflux disease)     Heart failure ([facility])     Hypertension      Current Outpatient Medications   Medication Sig    Eliquis 5 mg tablet     carvediloL (COREG) 3.125 mg tablet     levETIRAcetam (KEPPRA) 500 mg tablet     DULoxetine (CYMBALTA) 30 mg capsule Take 1 Capsule by mouth daily.  insulin glargine (Lantus Solostar U-100 Insulin) 100 unit/mL (3 mL) inpn 30 Units by SubCUTAneous route daily.  omeprazole (PRILOSEC) 40 mg capsule Take 1 Capsule by mouth daily.  lisinopriL (PRINIVIL, ZESTRIL) 10 mg tablet Take 1 Tablet by mouth daily.     spironolactone (ALDACTONE) 25 mg tablet Take 1 Tablet by mouth daily.  nicotine (NICODERM CQ) 21 mg/24 hr 1 Patch by TransDERmal route every twenty-four (24) hours.  acetaminophen (TYLENOL) 325 mg tablet TAKE 3 TABLETS BY MOUTH EVERY 8 HOURS FOR 14 DAYS. (GENERIC FOR TYLENOL)    methocarbamoL (ROBAXIN) 500 mg tablet TAKE ONE TABLET BY MOUTH TWICE A DAY    polyethylene glycol (MIRALAX) 17 gram/dose powder MIX 17 GRAMS (1 CAPFUL) IN 4 TO 8 OUNCES OF LIQUID AND DRINK ONCE DAILY.  glucose blood VI test strips (blood glucose test) strip Please measure blood glucose 2 times per day. DX code: E11.9    lancets misc Please measure blood glucose 2 times per day. DX Code E11.9    multivit-min/FA/lycopen/lutein (CENTRUM SILVER MEN PO) Take  by mouth.  metFORMIN (GLUCOPHAGE) 1,000 mg tablet Take 1 Tab by mouth two (2) times daily (with meals).  furosemide (LASIX) 40 mg tablet Take 1 Tab by mouth daily.  Blood-Glucose Meter monitoring kit Patient with Type II DM needs glucometer for blood glucose monitoring. DX code E11.9 (Patient not taking: Reported on 7/30/2021)     No current facility-administered medications for this visit. No Known Allergies  History reviewed. No pertinent family history. Social History     Tobacco Use    Smoking status: Current Every Day Smoker     Packs/day: 0.50     Types: Cigarettes    Smokeless tobacco: Never Used   Substance Use Topics    Alcohol use: No    Drug use: Not Currently     Past Surgical History:   Procedure Laterality Date    HX CORONARY ARTERY BYPASS GRAFT      [address] way     PHYSICAL EXAMINATION:    Visit Vitals  /68   Pulse 79   Resp 18   Ht 6' (1.829 m)   Wt 222 lb 8 oz (100.9 kg)   SpO2 99%   BMI 30.18 kg/m²       NEUROLOGICAL EXAMINATION:     Mental Status:   Alert and oriented to person, place, and time. Attention span and concentration are normal. Speech is fluent. Cranial Nerves:    II, III, IV, VI:  Visual acuity grossly intact.   He has left-sided homonymous hemianopsia Pupils are equal, round, and reactive to light. Extra-ocular movements are full and fluid. Fundoscopic exam was benign, no ptosis or nystagmus. V-XII: Hearing is grossly intact. Facial features are symmetric, with normal sensation and strength. The palate rises symmetrically and the tongue protrudes midline. Sternocleidomastoids 5/5. Motor Examination: Normal tone, bulk, and strength. 5/5 muscle strength throughout. No cogwheel rigidity or clonus present. Sensory exam: Impaired distally in both lower extremities to pinprick , temperature, and vibration sense. Normal proprioception. Coordination:  Finger to nose and rapid arm movement testing was normal.   No resting or intention tremor    Gait and Station:  Steady. Cannot do tandem walking . Normal arm swing. No Rhomberg or pronator drift. No muscle wasting or fasiculations noted. Reflexes:  DTRs 2+ throughout except ankle jerks that were absent. Toes downgoing. LABS / IMAGING  CT Results (most recent):  Results from Hospital Encounter encounter on 10/29/21    CT HEAD WO CONT    Narrative  INDICATION: new seizures ? new pathology in brain- recent CVA    EXAM:  HEAD CT WITHOUT CONTRAST    COMPARISON: CTA October 29, 2021 and MRI October 30, 2021    TECHNIQUE:  Routine noncontrast axial head CT was performed. Sagittal and  coronal reconstructions were generated. CT dose reduction was achieved through use of a standardized protocol tailored  for this examination and automatic exposure control for dose modulation. FINDINGS:    Ventricles: Midline, no hydrocephalus. Intracranial Hemorrhage: None. Brain Parenchyma/Brainstem: Evolving right occipital infarction. Extensive  chronic white matter hypodensity throughout the supratentorial brain. Chronic  right caudate and internal capsule infarction. Basal Cisterns: Normal.  Paranasal Sinuses: Visualized sinuses are clear.   Additional Comments: Hyperdensity in the proximal right posterior cerebral  artery likely corresponds to occlusion on recent CTA. Impression  Extensive chronic white matter disease, and evolving nonhemorrhagic right  occipital infarction. Subtle hyperdensity right posterior cerebral artery likely  corresponds to occlusion on recent CTA. No definite new abnormality compared to  recent MRI. If high clinical concern for new abnormality recommend MRI. MRI Results (most recent):  Results from [place] encounter on 10/29/21    MRI BRAIN WO CONT    Narrative  EXAM: MRI BRAIN WO CONT    INDICATION: Generalized weakness and shortness of breath. Right occipital lobe  infarct on CT.    COMPARISON: CT head and CT angiography head on 10/29/2021. CONTRAST: None. TECHNIQUE:  Multiplanar multisequence acquisition without contrast of the brain. FINDINGS:  The ventricles are normal in size and position. Restricted diffusion involves  the right thalamus, right internal capsule, posterior, right lateral corpus  callosum, and right occipital lobe along the right posterior cerebral artery  territory. No other restricted diffusion. Hyperintense T2/flair signal is in the  region of restricted diffusion. Otherwise, there is extensive chronic  microvascular ischemic disease in the white matter, greatest in the bilateral  parietal lobes and kathryn. No visible right PCA flow void. There may be petechial  hemorrhage in the right occipital lobe. Sella turcica is partially empty. Cervical medullary junction is within normal  limits. Medial temporal lobes are symmetric. Impression  1. Late acute versus subacute infarcts along the right PCA territory involve the  basal ganglia, thalamus, corpus callosum, and occipital lobe. Evidence of  associated petechial hemorrhage. 2. Extensive chronic microvascular ischemic disease.     Imaging reviewed    Lab Results   Component Value Date/Time    Cholesterol, total 202 (H) 10/30/2021 07:18 AM    Cholesterol (POC) 120 06/28/2021 09:00 AM    HDL Cholesterol 43 10/30/2021 07:18 AM    HDL Cholesterol (POC) 35 06/28/2021 09:00 AM    LDL Cholesterol (POC) 60 06/28/2021 09:00 AM    LDL, calculated 134.8 (H) 10/30/2021 07:18 AM    VLDL, calculated 24.2 10/30/2021 07:18 AM    Triglyceride 121 10/30/2021 07:18 AM    Triglycerides (POC) 124 06/28/2021 09:00 AM    CHOL/HDL Ratio 4.7 10/30/2021 07:18 AM     Echocardiogram: No intracardiac shunt, normal EF, mildly dilated left atrium    ASSESSMENT    ICD-10-CM ICD-9-CM    1. CVA, old, homonymous hemianopsia  I69.398 438.7     H53.469 368.46    2. Diabetic polyneuropathy associated with type 2 diabetes mellitus (HCC)  E11.42 250.60 DULoxetine (CYMBALTA) 30 mg capsule     357.2        DISCUSSION  Mr. Art Alex had a stroke in the right parieto-occipital region due to atherothrombosis of the right posterior cerebral artery, likely related to underlying vascular risk factors. CT angiogram was unremarkable except for occlusion of the right posterior cerebral artery. Continue aspirin 81 mg daily along with atorvastatin 40 mg daily  He has persistent left-sided homonymous hemianopsia and should not drive  Continue with risk factor modification    Try duloxetine regarding painful diabetic peripheral neuropathy. May stop amitriptyline.      Leanna Aguiar MD  Diplomate, American Board of Psychiatry & Neurology (Neurology)  [facility] Board of Psychiatry & Neurology (Clinical Neurophysiology)  Diplomate, American Board of Electrodiagnostic Medicine

## 2022-02-20 ENCOUNTER — OFFICE VISIT (OUTPATIENT)
Dept: INTERNAL MEDICINE CLINIC | Age: 72
End: 2022-02-20
Payer: COMMERCIAL

## 2022-02-20 DIAGNOSIS — I10 ESSENTIAL HYPERTENSION: Primary | ICD-10-CM

## 2022-02-20 DIAGNOSIS — I50.9 CONGESTIVE HEART FAILURE, UNSPECIFIED HF CHRONICITY, UNSPECIFIED HEART FAILURE TYPE (HCC): ICD-10-CM

## 2022-02-20 DIAGNOSIS — Z86.73 HISTORY OF CVA (CEREBROVASCULAR ACCIDENT): ICD-10-CM

## 2022-02-20 DIAGNOSIS — J44.9 CHRONIC OBSTRUCTIVE PULMONARY DISEASE, UNSPECIFIED COPD TYPE (HCC): ICD-10-CM

## 2022-02-20 DIAGNOSIS — F14.11 HISTORY OF COCAINE ABUSE (HCC): ICD-10-CM

## 2022-02-20 DIAGNOSIS — I25.119 CORONARY ARTERY DISEASE WITH ANGINA PECTORIS, UNSPECIFIED VESSEL OR LESION TYPE, UNSPECIFIED WHETHER NATIVE OR TRANSPLANTED HEART (HCC): ICD-10-CM

## 2022-02-20 PROCEDURE — 99308 SBSQ NF CARE LOW MDM 20: CPT | Performed by: INTERNAL MEDICINE

## 2022-02-28 PROBLEM — F33.9 MAJOR DEPRESSIVE DISORDER, RECURRENT, UNSPECIFIED (HCC): Status: RESOLVED | Noted: 2022-02-15 | Resolved: 2022-02-28

## 2022-02-28 PROBLEM — F33.1 MAJOR DEPRESSIVE DISORDER, RECURRENT, MODERATE (HCC): Status: RESOLVED | Noted: 2022-02-15 | Resolved: 2022-02-28

## 2022-02-28 PROBLEM — F33.0 MAJOR DEPRESSIVE DISORDER, RECURRENT, MILD (HCC): Status: RESOLVED | Noted: 2022-02-15 | Resolved: 2022-02-28

## 2022-03-01 NOTE — PROGRESS NOTES
THIS IS NOT A COMPLETE MEDICAL RECORD ON THIS PATIENT.    THIS IS A PATIENT AT [facility]- [address] LISTED BELOW FOR MORE INFORMATION ON THIS PATIENT.    THE COMPLETE RECORD RESIDES WITH THIS LONG TERM CARE FACILITY    HPI: Jose Huang is a 80year-old seen at [facility] for follow-up. Had a recent right occipital CVA and orthostatic hypotension. PMH includes CAD with CABG, diastolic CHF, HTN, HLD, DM, COPD, DVT/PE on Eliquis. Reports feeling stronger. Doing well with rehab. Asking when he can go home. Denies focal neurological issues, headache, chest pain, dyspnea. Denies dizziness. Staff do not report any significant issues either. Allergies: NKA  Medications: NH med list reviewed and signed  Labs: Reviewed. A1c 6.5. . Rest of labs are stable.     Exam:  Vital signs stable, afebrile, NAD, in bed, answers questions properly   HEENT: Some decreased peripheral vision otherwise unremarkable  Neck: Supple without JVD, bruits or thyromegaly  Heart: Regular without MRG  Lungs: Diminished sounds but overall clear  Abdomen: Soft, nontender, normal bowel sounds  Extremities: No edema, DJD changes  Neuro: Generalized weakness otherwise grossly nonfocal  Skin: Intact    Impression:  Recent right occipital CVA  Orthostatic hypotension  CAD with history of CABG  Diastolic CHF  HTN  DM  HLD  COPD  History of PE/DVT  Substance abuse    Plan:  Continue current meds  Baseline labs  PT, OT, ST  Monitor BP and pulse  Fall precautions  Follow-up with neurologist as scheduled  Overall seems stable at this point  Full code  Discharge plans    Mj Haley D.O.

## 2022-03-18 PROBLEM — R55 NEAR SYNCOPE: Status: ACTIVE | Noted: 2019-01-02

## 2022-03-18 PROBLEM — I95.1 ORTHOSTATIC HYPOTENSION: Status: ACTIVE | Noted: 2022-01-10

## 2022-03-18 PROBLEM — R06.09 DYSPNEA ON EXERTION: Status: ACTIVE | Noted: 2019-01-02

## 2022-03-18 PROBLEM — E78.00 HYPERCHOLESTEROLEMIA: Status: ACTIVE | Noted: 2022-01-10

## 2022-03-18 PROBLEM — Z86.711 HISTORY OF PULMONARY EMBOLUS (PE): Status: ACTIVE | Noted: 2022-01-10

## 2022-03-18 PROBLEM — I10 ESSENTIAL HYPERTENSION: Status: ACTIVE | Noted: 2022-01-10

## 2022-03-19 PROBLEM — Z79.4 TYPE 2 DIABETES MELLITUS WITHOUT COMPLICATION, WITH LONG-TERM CURRENT USE OF INSULIN (HCC): Status: ACTIVE | Noted: 2021-11-12

## 2022-03-19 PROBLEM — I63.9 ACUTE CVA (CEREBROVASCULAR ACCIDENT) (HCC): Status: ACTIVE | Noted: 2021-10-29

## 2022-03-19 PROBLEM — I50.32 DIASTOLIC CHF, CHRONIC (HCC): Status: ACTIVE | Noted: 2022-01-10

## 2022-03-19 PROBLEM — J44.9 CHRONIC OBSTRUCTIVE PULMONARY DISEASE, UNSPECIFIED COPD TYPE (HCC): Status: ACTIVE | Noted: 2022-01-10

## 2022-03-19 PROBLEM — I25.10 CORONARY ARTERY DISEASE: Status: ACTIVE | Noted: 2019-01-02

## 2022-03-19 PROBLEM — E11.9 TYPE 2 DIABETES MELLITUS WITHOUT COMPLICATION, WITH LONG-TERM CURRENT USE OF INSULIN (HCC): Status: ACTIVE | Noted: 2021-11-12

## 2022-03-20 PROBLEM — F19.11 HISTORY OF SUBSTANCE ABUSE (HCC): Status: ACTIVE | Noted: 2022-01-10

## 2022-06-16 ENCOUNTER — APPOINTMENT (OUTPATIENT)
Dept: GENERAL RADIOLOGY | Age: 72
End: 2022-06-16
Attending: EMERGENCY MEDICINE
Payer: MEDICAID

## 2022-06-16 ENCOUNTER — HOSPITAL ENCOUNTER (EMERGENCY)
Age: 72
Discharge: HOME | End: 2022-06-16
Attending: EMERGENCY MEDICINE | Admitting: EMERGENCY MEDICINE
Payer: MEDICAID

## 2022-06-16 VITALS
BODY MASS INDEX: 30.07 KG/M2 | TEMPERATURE: 98.3 F | HEART RATE: 64 BPM | DIASTOLIC BLOOD PRESSURE: 79 MMHG | HEIGHT: 72 IN | RESPIRATION RATE: 18 BRPM | OXYGEN SATURATION: 100 % | SYSTOLIC BLOOD PRESSURE: 139 MMHG | WEIGHT: 222 LBS

## 2022-06-16 DIAGNOSIS — I95.1 ORTHOSTATIC SYNCOPE: Primary | ICD-10-CM

## 2022-06-16 LAB
ALBUMIN SERPL-MCNC: 3.1 G/DL (ref 3.5–5)
ALBUMIN/GLOB SERPL: 0.8 {RATIO} (ref 1.1–2.2)
ALP SERPL-CCNC: 98 U/L (ref 45–117)
ALT SERPL-CCNC: 38 U/L (ref 12–78)
ANION GAP SERPL CALC-SCNC: 7 MMOL/L (ref 5–15)
AST SERPL W P-5'-P-CCNC: 34 U/L (ref 15–37)
ATRIAL RATE: 69 BPM
BASOPHILS # BLD: 0 K/UL (ref 0–0.1)
BASOPHILS NFR BLD: 0 % (ref 0–1)
BILIRUB SERPL-MCNC: 0.6 MG/DL (ref 0.2–1)
BUN SERPL-MCNC: 9 MG/DL (ref 6–20)
BUN/CREAT SERPL: 12 (ref 12–20)
CA-I BLD-MCNC: 8.9 MG/DL (ref 8.5–10.1)
CALCULATED P AXIS, ECG09: 66 DEGREES
CALCULATED R AXIS, ECG10: -70 DEGREES
CALCULATED T AXIS, ECG11: 27 DEGREES
CHLORIDE SERPL-SCNC: 99 MMOL/L (ref 97–108)
CO2 SERPL-SCNC: 25 MMOL/L (ref 21–32)
CREAT SERPL-MCNC: 0.76 MG/DL (ref 0.7–1.3)
DIAGNOSIS, 93000: NORMAL
DIFFERENTIAL METHOD BLD: ABNORMAL
EOSINOPHIL # BLD: 0.2 K/UL (ref 0–0.4)
EOSINOPHIL NFR BLD: 3 % (ref 0–7)
ERYTHROCYTE [DISTWIDTH] IN BLOOD BY AUTOMATED COUNT: 12.2 % (ref 11.5–14.5)
GLOBULIN SER CALC-MCNC: 3.7 G/DL (ref 2–4)
GLUCOSE BLD STRIP.AUTO-MCNC: 361 MG/DL (ref 65–117)
GLUCOSE SERPL-MCNC: 338 MG/DL (ref 65–100)
HCT VFR BLD AUTO: 43.6 % (ref 36.6–50.3)
HGB BLD-MCNC: 14.7 G/DL (ref 12.1–17)
IMM GRANULOCYTES # BLD AUTO: 0 K/UL (ref 0–0.04)
IMM GRANULOCYTES NFR BLD AUTO: 0 % (ref 0–0.5)
LYMPHOCYTES # BLD: 3.7 K/UL (ref 0.8–3.5)
LYMPHOCYTES NFR BLD: 51 % (ref 12–49)
MCH RBC QN AUTO: 30.6 PG (ref 26–34)
MCHC RBC AUTO-ENTMCNC: 33.7 G/DL (ref 30–36.5)
MCV RBC AUTO: 90.6 FL (ref 80–99)
MONOCYTES # BLD: 0.5 K/UL (ref 0–1)
MONOCYTES NFR BLD: 7 % (ref 5–13)
NEUTS SEG # BLD: 2.9 K/UL (ref 1.8–8)
NEUTS SEG NFR BLD: 39 % (ref 32–75)
P-R INTERVAL, ECG05: 178 MS
PERFORMED BY, TECHID: ABNORMAL
PLATELET # BLD AUTO: 160 K/UL (ref 150–400)
PMV BLD AUTO: 10.6 FL (ref 8.9–12.9)
POTASSIUM SERPL-SCNC: 4.8 MMOL/L (ref 3.5–5.1)
PROT SERPL-MCNC: 6.8 G/DL (ref 6.4–8.2)
Q-T INTERVAL, ECG07: 404 MS
QRS DURATION, ECG06: 126 MS
QTC CALCULATION (BEZET), ECG08: 432 MS
RBC # BLD AUTO: 4.81 M/UL (ref 4.1–5.7)
SODIUM SERPL-SCNC: 131 MMOL/L (ref 136–145)
TROPONIN-HIGH SENSITIVITY: 21 NG/L (ref 0–76)
VENTRICULAR RATE, ECG03: 69 BPM
WBC # BLD AUTO: 7.3 K/UL (ref 4.1–11.1)

## 2022-06-16 PROCEDURE — 85025 COMPLETE CBC W/AUTO DIFF WBC: CPT

## 2022-06-16 PROCEDURE — 74011000250 HC RX REV CODE- 250: Performed by: EMERGENCY MEDICINE

## 2022-06-16 PROCEDURE — 80053 COMPREHEN METABOLIC PANEL: CPT

## 2022-06-16 PROCEDURE — 74011250636 HC RX REV CODE- 250/636: Performed by: EMERGENCY MEDICINE

## 2022-06-16 PROCEDURE — 84484 ASSAY OF TROPONIN QUANT: CPT

## 2022-06-16 PROCEDURE — 71045 X-RAY EXAM CHEST 1 VIEW: CPT

## 2022-06-16 PROCEDURE — 93005 ELECTROCARDIOGRAM TRACING: CPT

## 2022-06-16 PROCEDURE — 82962 GLUCOSE BLOOD TEST: CPT

## 2022-06-16 PROCEDURE — 99285 EMERGENCY DEPT VISIT HI MDM: CPT

## 2022-06-16 PROCEDURE — 36415 COLL VENOUS BLD VENIPUNCTURE: CPT

## 2022-06-16 RX ORDER — LIDOCAINE 4 G/100G
1 PATCH TOPICAL EVERY 24 HOURS
Status: DISCONTINUED | OUTPATIENT
Start: 2022-06-16 | End: 2022-06-16 | Stop reason: HOSPADM

## 2022-06-16 RX ADMIN — SODIUM CHLORIDE 500 ML: 9 INJECTION, SOLUTION INTRAVENOUS at 17:11

## 2022-06-16 RX ADMIN — SODIUM CHLORIDE 1000 ML: 9 INJECTION, SOLUTION INTRAVENOUS at 15:30

## 2022-06-16 NOTE — ED PROVIDER NOTES
EMERGENCY DEPARTMENT HISTORY AND PHYSICAL EXAM      Date: 6/16/2022  Patient Name: Gaston Wells  Patient Age and Sex: 80 y.o. male     History of Presenting Illness     Chief Complaint   Patient presents with    Syncope       History Provided By: Patient    HPI: Gaston Wells is a 71-year-old male presenting for syncope. Patient states he is got a history of syncope, diabetes, hypertension, on Eliquis presenting after syncope. Patient states that he went to the bathroom this morning and felt lightheaded and put his arms out the next thing he knew he was on the ground. States he did hit the stool with this chest.  Currently having a little bit of right shoulder pain as well as right knee pain but able to move those without any difficulty. Patient denies any chest pain or shortness of breath before the syncopal event. Patient states that this is happened multiple times before. He has taken his blood pressure medications. There are no other complaints, changes, or physical findings at this time. PCP: Adelso Atkinson NP    No current facility-administered medications on file prior to encounter. Current Outpatient Medications on File Prior to Encounter   Medication Sig Dispense Refill    Eliquis 5 mg tablet       carvediloL (COREG) 3.125 mg tablet       levETIRAcetam (KEPPRA) 500 mg tablet       DULoxetine (CYMBALTA) 30 mg capsule Take 1 Capsule by mouth daily. 30 Capsule 1    insulin glargine (Lantus Solostar U-100 Insulin) 100 unit/mL (3 mL) inpn 30 Units by SubCUTAneous route daily. 3 Pen 0    omeprazole (PRILOSEC) 40 mg capsule Take 1 Capsule by mouth daily. 30 Capsule 0    lisinopriL (PRINIVIL, ZESTRIL) 10 mg tablet Take 1 Tablet by mouth daily. 30 Tablet 0    spironolactone (ALDACTONE) 25 mg tablet Take 1 Tablet by mouth daily. 30 Tablet 0    nicotine (NICODERM CQ) 21 mg/24 hr 1 Patch by TransDERmal route every twenty-four (24) hours.       acetaminophen (TYLENOL) 325 mg tablet TAKE 3 TABLETS BY MOUTH EVERY 8 HOURS FOR 14 DAYS. (GENERIC FOR TYLENOL)      methocarbamoL (ROBAXIN) 500 mg tablet TAKE ONE TABLET BY MOUTH TWICE A DAY      polyethylene glycol (MIRALAX) 17 gram/dose powder MIX 17 GRAMS (1 CAPFUL) IN 4 TO 8 OUNCES OF LIQUID AND DRINK ONCE DAILY.  Blood-Glucose Meter monitoring kit Patient with Type II DM needs glucometer for blood glucose monitoring. DX code E11.9 (Patient not taking: Reported on 7/30/2021) 1 Kit 0    glucose blood VI test strips (blood glucose test) strip Please measure blood glucose 2 times per day. DX code: E11.9 100 Strip 2    lancets misc Please measure blood glucose 2 times per day. DX Code E11.9 1 Each 2    multivit-min/FA/lycopen/lutein (CENTRUM SILVER MEN PO) Take  by mouth.  metFORMIN (GLUCOPHAGE) 1,000 mg tablet Take 1 Tab by mouth two (2) times daily (with meals). 60 Tab 0    furosemide (LASIX) 40 mg tablet Take 1 Tab by mouth daily. 30 Tab 0       Past History     Past Medical History:  Past Medical History:   Diagnosis Date    CAD (coronary artery disease)     Diabetes ([facility] [address]     GERD (gastroesophageal reflux disease)     Heart failure (HCC)     Hypertension        Past Surgical History:  Past Surgical History:   Procedure Laterality Date    HX CORONARY ARTERY BYPASS GRAFT      4 way       Family History:  History reviewed. No pertinent family history. Social History:  Social History     Tobacco Use    Smoking status: Current Every Day Smoker     Packs/day: 0.50     Types: Cigarettes    Smokeless tobacco: Never Used   Substance Use Topics    Alcohol use: No    Drug use: Not Currently       Allergies:  No Known Allergies      Review of Systems   Review of Systems   Constitutional: Negative for chills and fever. Respiratory: Negative for cough and shortness of breath. Cardiovascular: Positive for chest pain. Gastrointestinal: Negative for abdominal pain, constipation, diarrhea, nausea and vomiting.    Genitourinary: Negative for dysuria, frequency and hematuria. Musculoskeletal: Positive for arthralgias. Neurological: Positive for syncope and light-headedness. Negative for weakness and numbness. All other systems reviewed and are negative. Physical Exam   Physical Exam  Vitals and nursing note reviewed. Constitutional:       Appearance: He is well-developed. HENT:      Head: Normocephalic and atraumatic. Nose: Nose normal.      Mouth/Throat:      Mouth: Mucous membranes are moist.   Eyes:      Extraocular Movements: Extraocular movements intact. Conjunctiva/sclera: Conjunctivae normal.   Cardiovascular:      Rate and Rhythm: Normal rate and regular rhythm. Comments: Mild chest wall tenderness. Pulmonary:      Effort: Pulmonary effort is normal. No respiratory distress. Breath sounds: Normal breath sounds. Abdominal:      General: There is no distension. Palpations: Abdomen is soft. Tenderness: There is no abdominal tenderness. Musculoskeletal:         General: Normal range of motion. Cervical back: Normal range of motion and neck supple. Skin:     General: Skin is warm and dry. Neurological:      General: No focal deficit present. Mental Status: He is alert and oriented to person, place, and time. Mental status is at baseline. Comments: Patient ambulatory from waiting room. Able to move his right shoulder around as well as bend at his right knee and walk.    Psychiatric:         Mood and Affect: Mood normal.          Diagnostic Study Results     Labs -     Recent Results (from the past 12 hour(s))   CBC WITH AUTOMATED DIFF    Collection Time: 06/16/22  1:45 PM   Result Value Ref Range    WBC 7.3 4.1 - 11.1 K/uL    RBC 4.81 4.10 - 5.70 M/uL    HGB 14.7 12.1 - 17.0 g/dL    HCT 43.6 36.6 - 50.3 %    MCV 90.6 80.0 - 99.0 FL    MCH 30.6 26.0 - 34.0 PG    MCHC 33.7 30.0 - 36.5 g/dL    RDW 12.2 11.5 - 14.5 %    PLATELET 321 924 - 672 K/uL    MPV 10.6 8.9 - 12.9 FL NEUTROPHILS 39 32 - 75 %    LYMPHOCYTES 51 (H) 12 - 49 %    MONOCYTES 7 5 - 13 %    EOSINOPHILS 3 0 - 7 %    BASOPHILS 0 0 - 1 %    IMMATURE GRANULOCYTES 0 0.0 - 0.5 %    ABS. NEUTROPHILS 2.9 1.8 - 8.0 K/UL    ABS. LYMPHOCYTES 3.7 (H) 0.8 - 3.5 K/UL    ABS. MONOCYTES 0.5 0.0 - 1.0 K/UL    ABS. EOSINOPHILS 0.2 0.0 - 0.4 K/UL    ABS. BASOPHILS 0.0 0.0 - 0.1 K/UL    ABS. IMM. GRANS. 0.0 0.00 - 0.04 K/UL    DF AUTOMATED     METABOLIC PANEL, COMPREHENSIVE    Collection Time: 06/16/22  1:45 PM   Result Value Ref Range    Sodium 131 (L) 136 - 145 mmol/L    Potassium 4.8 3.5 - 5.1 mmol/L    Chloride 99 97 - 108 mmol/L    CO2 25 21 - 32 mmol/L    Anion gap 7 5 - 15 mmol/L    Glucose 338 (H) 65 - 100 mg/dL    BUN 9 6 - 20 mg/dL    Creatinine 0.76 0.70 - 1.30 mg/dL    BUN/Creatinine ratio 12 12 - 20      GFR est AA >60 >60 ml/min/1.73m2    GFR est non-AA >60 >60 ml/min/1.73m2    Calcium 8.9 8.5 - 10.1 mg/dL    Bilirubin, total 0.6 0.2 - 1.0 mg/dL    AST (SGOT) 34 15 - 37 U/L    ALT (SGPT) 38 12 - 78 U/L    Alk.  phosphatase 98 45 - 117 U/L    Protein, total 6.8 6.4 - 8.2 g/dL    Albumin 3.1 (L) 3.5 - 5.0 g/dL    Globulin 3.7 2.0 - 4.0 g/dL    A-G Ratio 0.8 (L) 1.1 - 2.2     TROPONIN-HIGH SENSITIVITY    Collection Time: 06/16/22  1:45 PM   Result Value Ref Range    Troponin-High Sensitivity 21 0 - 76 ng/L   EKG, 12 LEAD, INITIAL    Collection Time: 06/16/22  2:01 PM   Result Value Ref Range    Ventricular Rate 69 BPM    Atrial Rate 69 BPM    P-R Interval 178 ms    QRS Duration 126 ms    Q-T Interval 404 ms    QTC Calculation (Bezet) 432 ms    Calculated P Axis 66 degrees    Calculated R Axis -70 degrees    Calculated T Axis 27 degrees    Diagnosis       Normal sinus rhythm  Left axis deviation  Right bundle branch block  Inferior infarct , age undetermined  Anterior infarct , age undetermined  Abnormal ECG  No previous ECGs available  Confirmed by Sharlene Decker MD, Christian Palomares ([id number]) on 6/16/2022 3:19:54 PM         Radiologic Studies - XR CHEST PORT   Final Result        CT Results  (Last 48 hours)    None        CXR Results  (Last 48 hours)               06/16/22 1557  XR CHEST PORT Final result    Narrative:  Chest single view. Comparison single view chest October 29, 2021. Same distribution coarse reticular markings through the lungs. No gross interstitial or alveolar pulmonary edema. Sternal wires and mediastinal staples, evidence for prior intrathoracic surgery. Cardiac and mediastinal structures unchanged. No pneumothorax or sizable pleural effusion. Medical Decision Making   I am the first provider for this patient. I reviewed the vital signs, available nursing notes, past medical history, past surgical history, family history and social history. Vital Signs-Reviewed the patient's vital signs. Patient Vitals for the past 12 hrs:   Temp Pulse Resp BP SpO2   06/16/22 1706 -- 79 -- 121/81 --   06/16/22 1703 -- 64 -- 115/86 --   06/16/22 1700 -- 62 18 (!) 148/91 100 %   06/16/22 1533 -- 64 20 129/71 99 %   06/16/22 1516 -- 77 -- (!) 73/50 --   06/16/22 1514 -- 72 -- (!) 109/58 --   06/16/22 1512 -- 66 -- 124/66 --   06/16/22 1340 98.3 °F (36.8 °C) 84 18 108/76 100 %       Records Reviewed: Nursing Notes and Old Medical Records    Provider Notes (Medical Decision Making):   Patient presents with syncope. Ddx: vasovagal/situational syncope, cardiogenic syncope, orthostatic hypotension, dehydration. Will get labs, EKG, orthostatics and fluids PRN.     Per the [facility] Syncope Rule, the patient does not have the following criteria:  1) Signs and symptoms of ACS  2) Signs of conduction disease  3) Worrisome cardiac history  4) Valvular heart disease by history or physical  examination  5) Family history of sudden death  6) Persistent abnormal vital signs in the ED  7) Volume depletion such as persistent dehydration, gastrointestinal bleeding, or hematocrit < 30  8) Primary CNS (central nervous system) event    The patient does not have any of the following risk factors for the [facility] Syncope Rule (SFSR):    C - History of congestive heart failure   H - Hematocrit < 30%   E - Abnormal ECG   S - Shortness of breath   S - Triage systolic blood pressure < 90      ED Course:   Initial assessment performed. The patients presenting problems have been discussed, and they are in agreement with the care plan formulated and outlined with them. I have encouraged them to ask questions as they arise throughout their visit. ED Course as of 06/16/22 1713   Thu Jun 16, 2022   1341 Per echo November 2021, was normal with ejection fraction 60 to 65%. [JS]   1416 EKG interpreted by me. Shows normal sinus rhythm with a heart rate of 69. Right bundle branch block. Lexacaftor axis deviation. The same as November 2021. [JS]   1502 Patient significantly orthostatic as his systolic went from 615 down to 77 on standing. That is likely the cause of his syncope. [JS]   6424 Patient states that he could drink more water but also drinks coffee every day. Does not drink alcohol. [JS]   7948 After 1 L, patient still orthostatic based on systolic blood pressure with systolic being 814 and then down to 121. Patient states he feels much better and no longer lightheaded on standing. We will give him 500 more of normal saline and discharge. [JS]      ED Course User Index  [JS] Alphonse Montgomery MD     Critical Care Time:   0    Disposition:  Discharge Note:  The patient has been re-evaluated and is ready for discharge. Reviewed available results with patient. Counseled patient on diagnosis and care plan. Patient has expressed understanding, and all questions have been answered. Patient agrees with plan and agrees to follow up as recommended, or to return to the ED if their symptoms worsen. Discharge instructions have been provided and explained to the patient, along with reasons to return to the ED.       PLAN:  Current Discharge Medication List        2. Follow-up Information    None       3. Return to ED if worse     Diagnosis     Clinical Impression:   1. Orthostatic syncope        Attestations:    Herman Brewer M.D. Please note that this dictation was completed with [employer], the computer voice recognition software. Quite often unanticipated grammatical, syntax, homophones, and other interpretive errors are inadvertently transcribed by the computer software. Please disregard these errors. Please excuse any errors that have escaped final proofreading. Thank you.

## 2022-06-16 NOTE — ED NOTES
IV removed. Pt a&ox4. gcs 15. Pt taken via wheelchair by ED RN and nursing student to ED entrance to get into private vehicle with pt caregiver; pt has discharge paperwork and personal belongings.

## 2022-06-16 NOTE — ED TRIAGE NOTES
Pt reports that he had a syncopal episode last night in the bathroom. He reports that when he woke, he was on the bathroom floor. Pt with chest wall pain, L shoulder pain and Knee pain.

## 2023-05-23 ENCOUNTER — APPOINTMENT (OUTPATIENT)
Facility: HOSPITAL | Age: 73
End: 2023-05-23
Payer: COMMERCIAL

## 2023-05-23 ENCOUNTER — HOSPITAL ENCOUNTER (EMERGENCY)
Facility: HOSPITAL | Age: 73
Discharge: HOME | End: 2023-05-23
Attending: STUDENT IN AN ORGANIZED HEALTH CARE EDUCATION/TRAINING PROGRAM
Payer: COMMERCIAL

## 2023-05-23 VITALS
DIASTOLIC BLOOD PRESSURE: 90 MMHG | RESPIRATION RATE: 16 BRPM | HEIGHT: 72 IN | SYSTOLIC BLOOD PRESSURE: 140 MMHG | OXYGEN SATURATION: 99 % | HEART RATE: 66 BPM | TEMPERATURE: 98.4 F | WEIGHT: 240 LBS | BODY MASS INDEX: 32.51 KG/M2

## 2023-05-23 DIAGNOSIS — R51.9 ACUTE NONINTRACTABLE HEADACHE, UNSPECIFIED HEADACHE TYPE: Primary | ICD-10-CM

## 2023-05-23 DIAGNOSIS — R51.9 NONINTRACTABLE HEADACHE, UNSPECIFIED CHRONICITY PATTERN, UNSPECIFIED HEADACHE TYPE: ICD-10-CM

## 2023-05-23 PROCEDURE — 96375 TX/PRO/DX INJ NEW DRUG ADDON: CPT

## 2023-05-23 PROCEDURE — 2580000003 HC RX 258: Performed by: STUDENT IN AN ORGANIZED HEALTH CARE EDUCATION/TRAINING PROGRAM

## 2023-05-23 PROCEDURE — 70450 CT HEAD/BRAIN W/O DYE: CPT

## 2023-05-23 PROCEDURE — 96365 THER/PROPH/DIAG IV INF INIT: CPT

## 2023-05-23 PROCEDURE — 99284 EMERGENCY DEPT VISIT MOD MDM: CPT

## 2023-05-23 PROCEDURE — 96366 THER/PROPH/DIAG IV INF ADDON: CPT

## 2023-05-23 PROCEDURE — 6360000002 HC RX W HCPCS: Performed by: STUDENT IN AN ORGANIZED HEALTH CARE EDUCATION/TRAINING PROGRAM

## 2023-05-23 RX ORDER — KETOROLAC TROMETHAMINE 15 MG/ML
15 INJECTION, SOLUTION INTRAMUSCULAR; INTRAVENOUS
Status: COMPLETED | OUTPATIENT
Start: 2023-05-23 | End: 2023-05-23

## 2023-05-23 RX ORDER — DIPHENHYDRAMINE HYDROCHLORIDE 50 MG/ML
25 INJECTION INTRAMUSCULAR; INTRAVENOUS
Status: COMPLETED | OUTPATIENT
Start: 2023-05-23 | End: 2023-05-23

## 2023-05-23 RX ORDER — MAGNESIUM SULFATE IN WATER 40 MG/ML
2000 INJECTION, SOLUTION INTRAVENOUS ONCE
Status: COMPLETED | OUTPATIENT
Start: 2023-05-23 | End: 2023-05-23

## 2023-05-23 RX ORDER — 0.9 % SODIUM CHLORIDE 0.9 %
500 INTRAVENOUS SOLUTION INTRAVENOUS ONCE
Status: COMPLETED | OUTPATIENT
Start: 2023-05-23 | End: 2023-05-23

## 2023-05-23 RX ORDER — PROCHLORPERAZINE EDISYLATE 5 MG/ML
10 INJECTION INTRAMUSCULAR; INTRAVENOUS ONCE
Status: COMPLETED | OUTPATIENT
Start: 2023-05-23 | End: 2023-05-23

## 2023-05-23 RX ADMIN — KETOROLAC TROMETHAMINE 15 MG: 15 INJECTION, SOLUTION INTRAMUSCULAR; INTRAVENOUS at 12:01

## 2023-05-23 RX ADMIN — SODIUM CHLORIDE 500 ML: 9 INJECTION, SOLUTION INTRAVENOUS at 12:00

## 2023-05-23 RX ADMIN — MAGNESIUM SULFATE HEPTAHYDRATE 2000 MG: 40 INJECTION, SOLUTION INTRAVENOUS at 11:59

## 2023-05-23 RX ADMIN — DIPHENHYDRAMINE HYDROCHLORIDE 25 MG: 50 INJECTION, SOLUTION INTRAMUSCULAR; INTRAVENOUS at 12:01

## 2023-05-23 RX ADMIN — PROCHLORPERAZINE EDISYLATE 10 MG: 5 INJECTION, SOLUTION INTRAMUSCULAR; INTRAVENOUS at 12:02

## 2023-05-23 ASSESSMENT — PAIN - FUNCTIONAL ASSESSMENT
PAIN_FUNCTIONAL_ASSESSMENT: NONE - DENIES PAIN
PAIN_FUNCTIONAL_ASSESSMENT: 0-10

## 2023-05-23 ASSESSMENT — PAIN SCALES - GENERAL: PAINLEVEL_OUTOF10: 10

## 2023-05-23 ASSESSMENT — LIFESTYLE VARIABLES
HOW MANY STANDARD DRINKS CONTAINING ALCOHOL DO YOU HAVE ON A TYPICAL DAY: PATIENT DOES NOT DRINK
HOW OFTEN DO YOU HAVE A DRINK CONTAINING ALCOHOL: NEVER

## 2023-05-23 NOTE — ED PROVIDER NOTES
[facility] EMERGENCY DEPT  EMERGENCY DEPARTMENT HISTORY AND PHYSICAL EXAM      Date: 5/23/2023  Patient Name: Selby Bence  MRN: [id number]  [place] 1950  Date of evaluation: 5/23/2023  Provider: Talisha Boland MD   Note Started: 11:59 AM EDT 5/23/23    HISTORY OF PRESENT ILLNESS     Chief Complaint   Patient presents with    Headache       History Provided By: Patient    HPI: Selby Bence is a 71 y.o. male with past medical history as reviewed below presents for evaluation of headache. Patient states headache started yesterday, located on the right side. He does not typically deal with headaches, states this is more severe than usual headache for him. No sudden onset, symptom has built since onset. No vision changes, no syncope, no motor weakness or loss of sensation. No interventions attempted at home. PAST MEDICAL HISTORY   Past Medical History:  Past Medical History:   Diagnosis Date    CAD (coronary artery disease)     Diabetes ([facility] [address]     GERD (gastroesophageal reflux disease)     Heart failure ([facility] [address]     Hypertension        Past Surgical History:  Past Surgical History:   Procedure Laterality Date    CORONARY ARTERY BYPASS GRAFT      [address] way       Family History:  History reviewed. No pertinent family history. Social History:  Social History     Tobacco Use    Smoking status: Every Day     Packs/day: 0.50     Types: Cigarettes    Smokeless tobacco: Never   Substance Use Topics    Alcohol use: No    Drug use: Not Currently       Allergies:  No Known Allergies    PCP: SAGE Stroud NP    Current Meds:   No current facility-administered medications for this encounter. Current Outpatient Medications   Medication Sig Dispense Refill    Lancets MISC Please measure blood glucose 2 times per day.  DX Code E11.9      acetaminophen (TYLENOL) 325 MG tablet TAKE 3 TABLETS BY MOUTH EVERY 8 HOURS FOR 14 DAYS. (GENERIC FOR TYLENOL)      apixaban (ELIQUIS) 5 MG TABS tablet ceived the

## 2023-05-23 NOTE — ED NOTES
Called group home and made staff aware of pt being discharge. Staff reported pt will need transportation arranged. Transport called and trip confirmation #[id number]  Transport unable to give ETA of ride at this time.      Lydia Joshi RN  05/23/23 8080

## 2023-05-23 NOTE — ED NOTES
Pt states did not eat lunch request tray. Lunch tray provided. Pt assisted into wheelchair and and eating at this time. Denies any needs and call bell in reach. Awaiting transportation for discharge back to group home.      Kristina Guillen RN  05/23/23 4760

## 2023-05-23 NOTE — ED TRIAGE NOTES
Pt arrived from group  with complaint of \"head pounding\" and lethargy. Was seen in an er last night for chest pain and they sent him home as everything checked out fine, pt today has a migraine, and feeling lethargic. Pt needed help walking to stretcher and appears tired.      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

## 2023-06-02 ENCOUNTER — HOSPITAL ENCOUNTER (EMERGENCY)
Facility: HOSPITAL | Age: 73
Discharge: HOME | End: 2023-06-02
Attending: EMERGENCY MEDICINE | Admitting: EMERGENCY MEDICINE
Payer: COMMERCIAL

## 2023-06-02 ENCOUNTER — APPOINTMENT (OUTPATIENT)
Facility: HOSPITAL | Age: 73
End: 2023-06-02
Payer: COMMERCIAL

## 2023-06-02 VITALS
HEART RATE: 63 BPM | SYSTOLIC BLOOD PRESSURE: 147 MMHG | HEIGHT: 72 IN | TEMPERATURE: 98.4 F | WEIGHT: 240 LBS | RESPIRATION RATE: 16 BRPM | DIASTOLIC BLOOD PRESSURE: 86 MMHG | BODY MASS INDEX: 32.51 KG/M2 | OXYGEN SATURATION: 99 %

## 2023-06-02 DIAGNOSIS — G44.209 TENSION HEADACHE: Primary | ICD-10-CM

## 2023-06-02 PROCEDURE — 36415 COLL VENOUS BLD VENIPUNCTURE: CPT

## 2023-06-02 PROCEDURE — 70450 CT HEAD/BRAIN W/O DYE: CPT

## 2023-06-02 PROCEDURE — 96361 HYDRATE IV INFUSION ADD-ON: CPT

## 2023-06-02 PROCEDURE — 99284 EMERGENCY DEPT VISIT MOD MDM: CPT

## 2023-06-02 PROCEDURE — 96374 THER/PROPH/DIAG INJ IV PUSH: CPT

## 2023-06-02 PROCEDURE — 6370000000 HC RX 637 (ALT 250 FOR IP): Performed by: EMERGENCY MEDICINE

## 2023-06-02 PROCEDURE — 6360000002 HC RX W HCPCS: Performed by: EMERGENCY MEDICINE

## 2023-06-02 PROCEDURE — 96375 TX/PRO/DX INJ NEW DRUG ADDON: CPT

## 2023-06-02 PROCEDURE — 2580000003 HC RX 258: Performed by: EMERGENCY MEDICINE

## 2023-06-02 RX ORDER — PROCHLORPERAZINE EDISYLATE 5 MG/ML
10 INJECTION INTRAMUSCULAR; INTRAVENOUS EVERY 6 HOURS PRN
Status: DISCONTINUED | OUTPATIENT
Start: 2023-06-02 | End: 2023-06-02 | Stop reason: HOSPADM

## 2023-06-02 RX ORDER — IBUPROFEN 600 MG/1
600 TABLET ORAL
Status: COMPLETED | OUTPATIENT
Start: 2023-06-02 | End: 2023-06-02

## 2023-06-02 RX ORDER — 0.9 % SODIUM CHLORIDE 0.9 %
1000 INTRAVENOUS SOLUTION INTRAVENOUS ONCE
Status: COMPLETED | OUTPATIENT
Start: 2023-06-02 | End: 2023-06-02

## 2023-06-02 RX ORDER — ACETAMINOPHEN 500 MG
1000 TABLET ORAL
Status: COMPLETED | OUTPATIENT
Start: 2023-06-02 | End: 2023-06-02

## 2023-06-02 RX ORDER — DIPHENHYDRAMINE HYDROCHLORIDE 50 MG/ML
25 INJECTION INTRAMUSCULAR; INTRAVENOUS
Status: COMPLETED | OUTPATIENT
Start: 2023-06-02 | End: 2023-06-02

## 2023-06-02 RX ADMIN — DIPHENHYDRAMINE HYDROCHLORIDE 25 MG: 50 INJECTION INTRAMUSCULAR; INTRAVENOUS at 12:50

## 2023-06-02 RX ADMIN — ACETAMINOPHEN 1000 MG: 500 TABLET ORAL at 11:10

## 2023-06-02 RX ADMIN — IBUPROFEN 600 MG: 600 TABLET, FILM COATED ORAL at 11:10

## 2023-06-02 RX ADMIN — SODIUM CHLORIDE 1000 ML: 9 INJECTION, SOLUTION INTRAVENOUS at 12:50

## 2023-06-02 RX ADMIN — PROCHLORPERAZINE EDISYLATE 10 MG: 5 INJECTION, SOLUTION INTRAMUSCULAR; INTRAVENOUS at 12:51

## 2023-06-02 ASSESSMENT — PAIN SCALES - GENERAL
PAINLEVEL_OUTOF10: 10
PAINLEVEL_OUTOF10: 10

## 2023-06-02 ASSESSMENT — PAIN - FUNCTIONAL ASSESSMENT: PAIN_FUNCTIONAL_ASSESSMENT: 0-10

## 2023-06-02 NOTE — ED NOTES
Pt confirms home address and that someone is there for him but doesn't remember number. Updated charge nurse. Pt is A&Ox4 and states he normally goes home by cab. Cab arranged by unit sec.  Pt ambulatory to [id number] Esmer Jimenez RN  06/02/23 7214

## 2023-06-02 NOTE — ED NOTES
Pain re-assessed and is unchanged. Pt states that morphine would help his headache and tells writer he used for >30yr. Pt educated on migraine treatment and ordered medications. Pt then states that his feet are bothering him from DM and if he could get morphine for that. Provider updated.      Marcy Chicas RN  06/02/23 0741

## 2023-06-02 NOTE — DISCHARGE INSTRUCTIONS
Thank you! Thank you for allowing me to care for you in the emergency department. It is my goal to provide you with excellent care. If you have not received excellent quality care, please ask to speak to the nurse manager. Please fill out the survey that will come to you by mail or email since we listen to your feedback! Below you will find a list of your tests from today's visit. Should you have any questions, please do not hesitate to call the emergency department. Labs  No results found for this or any previous visit (from the past 12 hour(s)). Radiologic Studies  CT Head W/O Contrast   Final Result   1. No evidence of acute infarct or intracranial hemorrhage. 2. Severe periventricular white matter disease is likely secondary to chronic   small vessel ischemic changes. 3. Chronic right occipital infarction.            ------------------------------------------------------------------------------------------------------------  The exam and treatment you received in the Emergency Department were for an urgent problem and are not intended as complete care. It is important that you follow-up with a doctor, nurse practitioner, or physician assistant to:  (1) confirm your diagnosis,  (2) re-evaluation of changes in your illness and treatment, and  (3) for ongoing care. Please take your discharge instructions with you when you go to your follow-up appointment. If you have any problem arranging a follow-up appointment, contact the Emergency Department. If your symptoms become worse or you do not improve as expected and you are unable to reach your health care provider, please return to the Emergency Department. We are available 24 hours a day. If a prescription has been provided, please have it filled as soon as possible to prevent a delay in treatment.  If you have any questions or reservations about taking the medication due to side effects or interactions with other medications, please

## 2023-06-02 NOTE — ED TRIAGE NOTES
Patient from adult  center with complaint of headache and dizziness.     [address] [place] [address] [address]

## 2023-06-02 NOTE — ED PROVIDER NOTES
[facility] EMERGENCY DEPT  EMERGENCY DEPARTMENT HISTORY AND PHYSICAL EXAM      Date: 6/2/2023  Patient Name: Pollo Buenrostro  MRN: [id number]  [place]: 1950  Date of evaluation: 6/2/2023  Provider: Yennifer Valdez MD   Note Started: 11:42 AM EDT 6/2/23    HISTORY OF PRESENT ILLNESS     Chief Complaint   Patient presents with    Headache    Dizziness       History Provided By: Patient    HPI: Pollo Buenrostro is a 71 y.o. male past medical history of coronary artery disease, diabetes, GERD, hypertension and heart failure who presents for evaluation of headache. Started just prior to arrival.  Patient was at his adult . They initially room gave him Excedrin but they could not find any so they called the ambulance to bring him here for headache medication. Patient endorses that the headache did start all the sudden however was not maximal onset. Associated with any new neurologic deficits. No falls or trauma. No recent illness. PAST MEDICAL HISTORY   Past Medical History:  Past Medical History:   Diagnosis Date    CAD (coronary artery disease)     Diabetes ([facility] [address]     GERD (gastroesophageal reflux disease)     Heart failure ([facility] [address]     Hypertension        Past Surgical History:  Past Surgical History:   Procedure Laterality Date    CORONARY ARTERY BYPASS GRAFT      [address]       Family History:  No family history on file.     Social History:  Social History     Tobacco Use    Smoking status: Every Day     Packs/day: 0.50     Types: Cigarettes    Smokeless tobacco: Never   Substance Use Topics    Alcohol use: No    Drug use: Not Currently       Allergies:  No Known Allergies    PCP: SAGE Buitrago NP    Current Meds:   Current Facility-Administered Medications   Medication Dose Route Frequency Provider Last Rate Last Admin    0.9 % sodium chloride bolus  1,000 mL IntraVENous Once Yennifer Valdez .9 mL/hr at 06/02/23 1250 1,000 mL at 06/02/23 1250    prochlorperazine (COMPAZINE) injection 10 mg

## 2023-06-30 ENCOUNTER — APPOINTMENT (OUTPATIENT)
Facility: HOSPITAL | Age: 73
End: 2023-06-30
Payer: COMMERCIAL

## 2023-06-30 ENCOUNTER — HOSPITAL ENCOUNTER (EMERGENCY)
Facility: HOSPITAL | Age: 73
Discharge: HOME | End: 2023-06-30
Attending: EMERGENCY MEDICINE
Payer: COMMERCIAL

## 2023-06-30 VITALS
HEART RATE: 73 BPM | OXYGEN SATURATION: 98 % | WEIGHT: 252 LBS | DIASTOLIC BLOOD PRESSURE: 98 MMHG | TEMPERATURE: 97.9 F | HEIGHT: 74 IN | SYSTOLIC BLOOD PRESSURE: 156 MMHG | RESPIRATION RATE: 15 BRPM | BODY MASS INDEX: 32.34 KG/M2

## 2023-06-30 DIAGNOSIS — K52.9 GASTROENTERITIS: ICD-10-CM

## 2023-06-30 DIAGNOSIS — K21.9 GASTROESOPHAGEAL REFLUX DISEASE WITHOUT ESOPHAGITIS: ICD-10-CM

## 2023-06-30 DIAGNOSIS — E86.0 DEHYDRATION: Primary | ICD-10-CM

## 2023-06-30 LAB
ALBUMIN SERPL-MCNC: 3.3 G/DL (ref 3.5–5)
ALBUMIN/GLOB SERPL: 1 (ref 1.1–2.2)
ALP SERPL-CCNC: 88 U/L (ref 45–117)
ALT SERPL-CCNC: 27 U/L (ref 12–78)
ANION GAP SERPL CALC-SCNC: 4 MMOL/L (ref 5–15)
AST SERPL W P-5'-P-CCNC: 14 U/L (ref 15–37)
BASOPHILS # BLD: 0.1 K/UL (ref 0–0.1)
BASOPHILS NFR BLD: 1 % (ref 0–1)
BILIRUB SERPL-MCNC: 0.5 MG/DL (ref 0.2–1)
BUN SERPL-MCNC: 12 MG/DL (ref 6–20)
BUN/CREAT SERPL: 15 (ref 12–20)
CA-I BLD-MCNC: 8.9 MG/DL (ref 8.5–10.1)
CHLORIDE SERPL-SCNC: 111 MMOL/L (ref 97–108)
CO2 SERPL-SCNC: 30 MMOL/L (ref 21–32)
CREAT SERPL-MCNC: 0.79 MG/DL (ref 0.7–1.3)
DIFFERENTIAL METHOD BLD: NORMAL
EKG ATRIAL RATE: 65 BPM
EKG DIAGNOSIS: NORMAL
EKG P AXIS: 40 DEGREES
EKG P-R INTERVAL: 158 MS
EKG Q-T INTERVAL: 446 MS
EKG QRS DURATION: 132 MS
EKG QTC CALCULATION (BAZETT): 463 MS
EKG R AXIS: -73 DEGREES
EKG T AXIS: 38 DEGREES
EKG VENTRICULAR RATE: 65 BPM
EOSINOPHIL # BLD: 0.4 K/UL (ref 0–0.4)
EOSINOPHIL NFR BLD: 5 % (ref 0–7)
ERYTHROCYTE [DISTWIDTH] IN BLOOD BY AUTOMATED COUNT: 13.1 % (ref 11.5–14.5)
GLOBULIN SER CALC-MCNC: 3.3 G/DL (ref 2–4)
GLUCOSE SERPL-MCNC: 112 MG/DL (ref 65–100)
HCT VFR BLD AUTO: 41.6 % (ref 36.6–50.3)
HGB BLD-MCNC: 13.8 G/DL (ref 12.1–17)
IMM GRANULOCYTES # BLD AUTO: 0 K/UL (ref 0–0.04)
IMM GRANULOCYTES NFR BLD AUTO: 0 % (ref 0–0.5)
LYMPHOCYTES # BLD: 3 K/UL (ref 0.8–3.5)
LYMPHOCYTES NFR BLD: 42 % (ref 12–49)
MCH RBC QN AUTO: 31 PG (ref 26–34)
MCHC RBC AUTO-ENTMCNC: 33.2 G/DL (ref 30–36.5)
MCV RBC AUTO: 93.5 FL (ref 80–99)
MONOCYTES # BLD: 0.6 K/UL (ref 0–1)
MONOCYTES NFR BLD: 8 % (ref 5–13)
NEUTS SEG # BLD: 3.1 K/UL (ref 1.8–8)
NEUTS SEG NFR BLD: 44 % (ref 32–75)
NRBC # BLD: 0 K/UL (ref 0–0.01)
NRBC BLD-RTO: 0 PER 100 WBC
PLATELET # BLD AUTO: 184 K/UL (ref 150–400)
PMV BLD AUTO: 10.8 FL (ref 8.9–12.9)
POTASSIUM SERPL-SCNC: 4.3 MMOL/L (ref 3.5–5.1)
PROT SERPL-MCNC: 6.6 G/DL (ref 6.4–8.2)
RBC # BLD AUTO: 4.45 M/UL (ref 4.1–5.7)
SODIUM SERPL-SCNC: 145 MMOL/L (ref 136–145)
TROPONIN I SERPL HS-MCNC: 17 NG/L (ref 0–76)
TROPONIN I SERPL HS-MCNC: 20 NG/L (ref 0–76)
WBC # BLD AUTO: 7.1 K/UL (ref 4.1–11.1)

## 2023-06-30 PROCEDURE — 6360000002 HC RX W HCPCS: Performed by: EMERGENCY MEDICINE

## 2023-06-30 PROCEDURE — 6370000000 HC RX 637 (ALT 250 FOR IP): Performed by: EMERGENCY MEDICINE

## 2023-06-30 PROCEDURE — 94762 N-INVAS EAR/PLS OXIMTRY CONT: CPT

## 2023-06-30 PROCEDURE — 2580000003 HC RX 258: Performed by: EMERGENCY MEDICINE

## 2023-06-30 PROCEDURE — 99285 EMERGENCY DEPT VISIT HI MDM: CPT

## 2023-06-30 PROCEDURE — 96375 TX/PRO/DX INJ NEW DRUG ADDON: CPT

## 2023-06-30 PROCEDURE — 80053 COMPREHEN METABOLIC PANEL: CPT

## 2023-06-30 PROCEDURE — 96361 HYDRATE IV INFUSION ADD-ON: CPT

## 2023-06-30 PROCEDURE — 94760 N-INVAS EAR/PLS OXIMETRY 1: CPT

## 2023-06-30 PROCEDURE — 93005 ELECTROCARDIOGRAM TRACING: CPT | Performed by: EMERGENCY MEDICINE

## 2023-06-30 PROCEDURE — 96374 THER/PROPH/DIAG INJ IV PUSH: CPT

## 2023-06-30 PROCEDURE — 71045 X-RAY EXAM CHEST 1 VIEW: CPT

## 2023-06-30 PROCEDURE — 36415 COLL VENOUS BLD VENIPUNCTURE: CPT

## 2023-06-30 PROCEDURE — 85025 COMPLETE CBC W/AUTO DIFF WBC: CPT

## 2023-06-30 PROCEDURE — 84484 ASSAY OF TROPONIN QUANT: CPT

## 2023-06-30 RX ORDER — HYDROCODONE BITARTRATE AND ACETAMINOPHEN 5; 325 MG/1; MG/1
1 TABLET ORAL
Status: COMPLETED | OUTPATIENT
Start: 2023-06-30 | End: 2023-06-30

## 2023-06-30 RX ORDER — ONDANSETRON 2 MG/ML
4 INJECTION INTRAMUSCULAR; INTRAVENOUS ONCE
Status: COMPLETED | OUTPATIENT
Start: 2023-06-30 | End: 2023-06-30

## 2023-06-30 RX ORDER — DIPHENOXYLATE HYDROCHLORIDE AND ATROPINE SULFATE 2.5; .025 MG/1; MG/1
1 TABLET ORAL 4 TIMES DAILY PRN
Status: DISCONTINUED | OUTPATIENT
Start: 2023-06-30 | End: 2023-06-30 | Stop reason: HOSPADM

## 2023-06-30 RX ORDER — 0.9 % SODIUM CHLORIDE 0.9 %
1000 INTRAVENOUS SOLUTION INTRAVENOUS ONCE
Status: COMPLETED | OUTPATIENT
Start: 2023-06-30 | End: 2023-06-30

## 2023-06-30 RX ORDER — MORPHINE SULFATE 4 MG/ML
4 INJECTION, SOLUTION INTRAMUSCULAR; INTRAVENOUS
Status: COMPLETED | OUTPATIENT
Start: 2023-06-30 | End: 2023-06-30

## 2023-06-30 RX ADMIN — MORPHINE SULFATE 4 MG: 4 INJECTION, SOLUTION INTRAMUSCULAR; INTRAVENOUS at 12:06

## 2023-06-30 RX ADMIN — SODIUM CHLORIDE 1000 ML: 9 INJECTION, SOLUTION INTRAVENOUS at 13:08

## 2023-06-30 RX ADMIN — DIPHENOXYLATE HYDROCHLORIDE AND ATROPINE SULFATE 1 TABLET: 2.5; .025 TABLET ORAL at 13:49

## 2023-06-30 RX ADMIN — Medication 30 ML: at 14:57

## 2023-06-30 RX ADMIN — HYDROCODONE BITARTRATE AND ACETAMINOPHEN 1 TABLET: 5; 325 TABLET ORAL at 15:00

## 2023-06-30 RX ADMIN — ONDANSETRON 4 MG: 2 INJECTION INTRAMUSCULAR; INTRAVENOUS at 12:07

## 2023-06-30 ASSESSMENT — PAIN SCALES - GENERAL: PAINLEVEL_OUTOF10: 9

## 2023-06-30 ASSESSMENT — PAIN - FUNCTIONAL ASSESSMENT: PAIN_FUNCTIONAL_ASSESSMENT: 0-10

## 2023-11-14 ENCOUNTER — APPOINTMENT (OUTPATIENT)
Facility: HOSPITAL | Age: 73
DRG: 204 | End: 2023-11-14
Payer: COMMERCIAL

## 2023-11-14 ENCOUNTER — HOSPITAL ENCOUNTER (INPATIENT)
Facility: HOSPITAL | Age: 73
LOS: 3 days | Discharge: HOME | DRG: 204 | End: 2023-11-18
Attending: EMERGENCY MEDICINE | Admitting: SURGERY
Payer: COMMERCIAL

## 2023-11-14 DIAGNOSIS — R07.9 CHEST PAIN, UNSPECIFIED TYPE: ICD-10-CM

## 2023-11-14 DIAGNOSIS — R55 SYNCOPE, UNSPECIFIED SYNCOPE TYPE: ICD-10-CM

## 2023-11-14 DIAGNOSIS — R55 SYNCOPE AND COLLAPSE: Primary | ICD-10-CM

## 2023-11-14 DIAGNOSIS — R05.1 ACUTE COUGH: ICD-10-CM

## 2023-11-14 LAB
ALBUMIN SERPL-MCNC: 3.4 G/DL (ref 3.5–5)
ALBUMIN/GLOB SERPL: 1 (ref 1.1–2.2)
ALP SERPL-CCNC: 89 U/L (ref 45–117)
ALT SERPL-CCNC: 26 U/L (ref 12–78)
ANION GAP SERPL CALC-SCNC: 4 MMOL/L (ref 5–15)
AST SERPL W P-5'-P-CCNC: 11 U/L (ref 15–37)
BASOPHILS # BLD: 0.1 K/UL (ref 0–0.1)
BASOPHILS NFR BLD: 1 % (ref 0–1)
BILIRUB SERPL-MCNC: 0.3 MG/DL (ref 0.2–1)
BNP SERPL-MCNC: 150 PG/ML
BUN SERPL-MCNC: 11 MG/DL (ref 6–20)
BUN/CREAT SERPL: 12 (ref 12–20)
CA-I BLD-MCNC: 9.3 MG/DL (ref 8.5–10.1)
CHLORIDE SERPL-SCNC: 105 MMOL/L (ref 97–108)
CO2 SERPL-SCNC: 30 MMOL/L (ref 21–32)
CREAT SERPL-MCNC: 0.93 MG/DL (ref 0.7–1.3)
DIFFERENTIAL METHOD BLD: ABNORMAL
EOSINOPHIL # BLD: 0.5 K/UL (ref 0–0.4)
EOSINOPHIL NFR BLD: 6 % (ref 0–7)
ERYTHROCYTE [DISTWIDTH] IN BLOOD BY AUTOMATED COUNT: 13.1 % (ref 11.5–14.5)
GLOBULIN SER CALC-MCNC: 3.3 G/DL (ref 2–4)
GLUCOSE SERPL-MCNC: 136 MG/DL (ref 65–100)
HCT VFR BLD AUTO: 41.8 % (ref 36.6–50.3)
HGB BLD-MCNC: 14.1 G/DL (ref 12.1–17)
IMM GRANULOCYTES # BLD AUTO: 0 K/UL (ref 0–0.04)
IMM GRANULOCYTES NFR BLD AUTO: 0 % (ref 0–0.5)
LYMPHOCYTES # BLD: 3.4 K/UL (ref 0.8–3.5)
LYMPHOCYTES NFR BLD: 42 % (ref 12–49)
MCH RBC QN AUTO: 30.7 PG (ref 26–34)
MCHC RBC AUTO-ENTMCNC: 33.7 G/DL (ref 30–36.5)
MCV RBC AUTO: 90.9 FL (ref 80–99)
MONOCYTES # BLD: 0.5 K/UL (ref 0–1)
MONOCYTES NFR BLD: 6 % (ref 5–13)
NEUTS SEG # BLD: 3.6 K/UL (ref 1.8–8)
NEUTS SEG NFR BLD: 45 % (ref 32–75)
NRBC # BLD: 0 K/UL (ref 0–0.01)
NRBC BLD-RTO: 0 PER 100 WBC
PLATELET # BLD AUTO: 191 K/UL (ref 150–400)
PMV BLD AUTO: 10.2 FL (ref 8.9–12.9)
POTASSIUM SERPL-SCNC: 4.6 MMOL/L (ref 3.5–5.1)
PROT SERPL-MCNC: 6.7 G/DL (ref 6.4–8.2)
RBC # BLD AUTO: 4.6 M/UL (ref 4.1–5.7)
SODIUM SERPL-SCNC: 139 MMOL/L (ref 136–145)
TROPONIN I SERPL HS-MCNC: 11 NG/L (ref 0–76)
WBC # BLD AUTO: 8.1 K/UL (ref 4.1–11.1)

## 2023-11-14 PROCEDURE — 94761 N-INVAS EAR/PLS OXIMETRY MLT: CPT

## 2023-11-14 PROCEDURE — 36415 COLL VENOUS BLD VENIPUNCTURE: CPT

## 2023-11-14 PROCEDURE — 85025 COMPLETE CBC W/AUTO DIFF WBC: CPT

## 2023-11-14 PROCEDURE — 6830039000 HC L3 TRAUMA ALERT

## 2023-11-14 PROCEDURE — 83880 ASSAY OF NATRIURETIC PEPTIDE: CPT

## 2023-11-14 PROCEDURE — 84484 ASSAY OF TROPONIN QUANT: CPT

## 2023-11-14 PROCEDURE — 72125 CT NECK SPINE W/O DYE: CPT

## 2023-11-14 PROCEDURE — 70450 CT HEAD/BRAIN W/O DYE: CPT

## 2023-11-14 PROCEDURE — 93005 ELECTROCARDIOGRAM TRACING: CPT | Performed by: EMERGENCY MEDICINE

## 2023-11-14 PROCEDURE — 71045 X-RAY EXAM CHEST 1 VIEW: CPT

## 2023-11-14 PROCEDURE — 99285 EMERGENCY DEPT VISIT HI MDM: CPT

## 2023-11-14 PROCEDURE — 80053 COMPREHEN METABOLIC PANEL: CPT

## 2023-11-14 ASSESSMENT — PAIN - FUNCTIONAL ASSESSMENT: PAIN_FUNCTIONAL_ASSESSMENT: 0-10

## 2023-11-14 ASSESSMENT — PAIN SCALES - GENERAL: PAINLEVEL_OUTOF10: 8

## 2023-11-14 ASSESSMENT — PAIN DESCRIPTION - LOCATION: LOCATION: CHEST

## 2023-11-15 ENCOUNTER — HOSPITAL ENCOUNTER (INPATIENT)
Facility: HOSPITAL | Age: 73
Discharge: HOME | DRG: 204 | End: 2023-11-17
Attending: PHYSICIAN ASSISTANT
Payer: COMMERCIAL

## 2023-11-15 ENCOUNTER — APPOINTMENT (OUTPATIENT)
Facility: HOSPITAL | Age: 73
DRG: 204 | End: 2023-11-15
Attending: PHYSICIAN ASSISTANT
Payer: COMMERCIAL

## 2023-11-15 PROBLEM — R55 SYNCOPE, UNSPECIFIED SYNCOPE TYPE: Status: ACTIVE | Noted: 2023-11-15

## 2023-11-15 LAB
D DIMER PPP FEU-MCNC: 1.21 UG/ML(FEU)
ECHO AO ASC DIAM: 3.4 CM
ECHO AO ASCENDING AORTA INDEX: 1.5 CM/M2
ECHO AO ROOT DIAM: 3.9 CM
ECHO AO ROOT INDEX: 1.73 CM/M2
ECHO AV MEAN GRADIENT: 3 MMHG
ECHO AV MEAN VELOCITY: 0.8 M/S
ECHO AV PEAK GRADIENT: 6 MMHG
ECHO AV PEAK VELOCITY: 1.2 M/S
ECHO AV VELOCITY RATIO: 0.75
ECHO AV VTI: 22.5 CM
ECHO BSA: 2.29 M2
ECHO EST RA PRESSURE: 3 MMHG
ECHO LA AREA 2C: 11.3 CM2
ECHO LA AREA 4C: 18.1 CM2
ECHO LA DIAMETER INDEX: 2.08 CM/M2
ECHO LA DIAMETER: 4.7 CM
ECHO LA MAJOR AXIS: 5.5 CM
ECHO LA MINOR AXIS: 5 CM
ECHO LA TO AORTIC ROOT RATIO: 1.21
ECHO LA VOL BP: 32 ML (ref 18–58)
ECHO LA VOL MOD A2C: 21 ML (ref 18–58)
ECHO LA VOL MOD A4C: 46 ML (ref 18–58)
ECHO LA VOL/BSA BIPLANE: 14 ML/M2 (ref 16–34)
ECHO LA VOLUME INDEX MOD A2C: 9 ML/M2 (ref 16–34)
ECHO LA VOLUME INDEX MOD A4C: 20 ML/M2 (ref 16–34)
ECHO LV EDV A2C: 72 ML
ECHO LV EDV A4C: 64 ML
ECHO LV EDV INDEX A4C: 28 ML/M2
ECHO LV EDV NDEX A2C: 32 ML/M2
ECHO LV EJECTION FRACTION A2C: 54 %
ECHO LV EJECTION FRACTION A4C: 61 %
ECHO LV EJECTION FRACTION BIPLANE: 57 % (ref 55–100)
ECHO LV ESV A2C: 34 ML
ECHO LV ESV A4C: 25 ML
ECHO LV ESV INDEX A2C: 15 ML/M2
ECHO LV ESV INDEX A4C: 11 ML/M2
ECHO LV FRACTIONAL SHORTENING: 30 % (ref 28–44)
ECHO LV INTERNAL DIMENSION DIASTOLE INDEX: 2.08 CM/M2
ECHO LV INTERNAL DIMENSION DIASTOLIC: 4.7 CM (ref 4.2–5.9)
ECHO LV INTERNAL DIMENSION SYSTOLIC INDEX: 1.46 CM/M2
ECHO LV INTERNAL DIMENSION SYSTOLIC: 3.3 CM
ECHO LV IVSD: 1.1 CM (ref 0.6–1)
ECHO LV MASS 2D: 175.8 G (ref 88–224)
ECHO LV MASS INDEX 2D: 77.8 G/M2 (ref 49–115)
ECHO LV POSTERIOR WALL DIASTOLIC: 1 CM (ref 0.6–1)
ECHO LV RELATIVE WALL THICKNESS RATIO: 0.43
ECHO LVOT AV VTI INDEX: 0.75
ECHO LVOT MEAN GRADIENT: 2 MMHG
ECHO LVOT PEAK GRADIENT: 3 MMHG
ECHO LVOT PEAK VELOCITY: 0.9 M/S
ECHO LVOT VTI: 16.8 CM
ECHO MV A VELOCITY: 0.89 M/S
ECHO MV E VELOCITY: 0.78 M/S
ECHO MV E/A RATIO: 0.88
ECHO MV LVOT VTI INDEX: 2.02
ECHO MV MAX VELOCITY: 1 M/S
ECHO MV MEAN GRADIENT: 1 MMHG
ECHO MV MEAN VELOCITY: 0.5 M/S
ECHO MV PEAK GRADIENT: 4 MMHG
ECHO MV REGURGITANT PEAK GRADIENT: 18 MMHG
ECHO MV REGURGITANT PEAK VELOCITY: 2.1 M/S
ECHO MV REGURGITANT VTIA: 53.6 CM
ECHO MV VTI: 34 CM
ECHO PULMONARY ARTERY END DIASTOLIC PRESSURE: 7 MMHG
ECHO PV MAX VELOCITY: 0.8 M/S
ECHO PV PEAK GRADIENT: 3 MMHG
ECHO PV REGURGITANT MAX VELOCITY: 1.4 M/S
ECHO PVEIN PEAK D VELOCITY: 0.6 M/S
ECHO PVEIN PEAK S VELOCITY: 0.5 M/S
ECHO PVEIN S/D RATIO: 0.8 NO UNITS
ECHO RA AREA 4C: 18.4 CM2
ECHO RA END SYSTOLIC VOLUME APICAL 4 CHAMBER INDEX BSA: 20 ML/M2
ECHO RA VOLUME: 45 ML
ECHO RIGHT VENTRICULAR SYSTOLIC PRESSURE (RVSP): 27 MMHG
ECHO RV BASAL DIMENSION: 3.7 CM
ECHO RV FREE WALL PEAK S': 12 CM/S
ECHO RV MID DIMENSION: 2.6 CM
ECHO RV TAPSE: 1.1 CM (ref 1.7–?)
ECHO TV REGURGITANT MAX VELOCITY: 2.43 M/S
ECHO TV REGURGITANT PEAK GRADIENT: 24 MMHG
EKG ATRIAL RATE: 68 BPM
EKG DIAGNOSIS: NORMAL
EKG P AXIS: 45 DEGREES
EKG P-R INTERVAL: 176 MS
EKG Q-T INTERVAL: 432 MS
EKG QRS DURATION: 142 MS
EKG QTC CALCULATION (BAZETT): 459 MS
EKG R AXIS: -72 DEGREES
EKG T AXIS: 40 DEGREES
EKG VENTRICULAR RATE: 68 BPM
GLUCOSE BLD STRIP.AUTO-MCNC: 106 MG/DL (ref 65–100)
GLUCOSE BLD STRIP.AUTO-MCNC: 159 MG/DL (ref 65–100)
PERFORMED BY:: ABNORMAL
PERFORMED BY:: ABNORMAL
T4 FREE SERPL-MCNC: 1.1 NG/DL (ref 0.8–1.5)
TROPONIN I SERPL HS-MCNC: 10 NG/L (ref 0–76)
TROPONIN I SERPL HS-MCNC: 13 NG/L (ref 0–76)
TSH SERPL DL<=0.05 MIU/L-ACNC: 0.68 UIU/ML (ref 0.36–3.74)

## 2023-11-15 PROCEDURE — 6370000000 HC RX 637 (ALT 250 FOR IP): Performed by: INTERNAL MEDICINE

## 2023-11-15 PROCEDURE — 1100000000 HC RM PRIVATE

## 2023-11-15 PROCEDURE — 82962 GLUCOSE BLOOD TEST: CPT

## 2023-11-15 PROCEDURE — 99223 1ST HOSP IP/OBS HIGH 75: CPT | Performed by: SURGERY

## 2023-11-15 PROCEDURE — 94761 N-INVAS EAR/PLS OXIMETRY MLT: CPT

## 2023-11-15 PROCEDURE — 84439 ASSAY OF FREE THYROXINE: CPT

## 2023-11-15 PROCEDURE — 6370000000 HC RX 637 (ALT 250 FOR IP): Performed by: PHYSICIAN ASSISTANT

## 2023-11-15 PROCEDURE — 93880 EXTRACRANIAL BILAT STUDY: CPT

## 2023-11-15 PROCEDURE — 85379 FIBRIN DEGRADATION QUANT: CPT

## 2023-11-15 PROCEDURE — 83036 HEMOGLOBIN GLYCOSYLATED A1C: CPT

## 2023-11-15 PROCEDURE — 93005 ELECTROCARDIOGRAM TRACING: CPT | Performed by: INTERNAL MEDICINE

## 2023-11-15 PROCEDURE — 84443 ASSAY THYROID STIM HORMONE: CPT

## 2023-11-15 PROCEDURE — 84484 ASSAY OF TROPONIN QUANT: CPT

## 2023-11-15 PROCEDURE — 93306 TTE W/DOPPLER COMPLETE: CPT

## 2023-11-15 PROCEDURE — 6370000000 HC RX 637 (ALT 250 FOR IP): Performed by: SURGERY

## 2023-11-15 RX ORDER — BENZONATATE 100 MG/1
100 CAPSULE ORAL 3 TIMES DAILY PRN
Status: DISCONTINUED | OUTPATIENT
Start: 2023-11-15 | End: 2023-11-18 | Stop reason: HOSPADM

## 2023-11-15 RX ORDER — ATORVASTATIN CALCIUM 40 MG/1
40 TABLET, FILM COATED ORAL DAILY
COMMUNITY
Start: 2023-11-01

## 2023-11-15 RX ORDER — LISINOPRIL 10 MG/1
10 TABLET ORAL DAILY
Status: DISCONTINUED | OUTPATIENT
Start: 2023-11-15 | End: 2023-11-18 | Stop reason: HOSPADM

## 2023-11-15 RX ORDER — PANTOPRAZOLE SODIUM 40 MG/1
40 TABLET, DELAYED RELEASE ORAL
Status: DISCONTINUED | OUTPATIENT
Start: 2023-11-16 | End: 2023-11-18 | Stop reason: HOSPADM

## 2023-11-15 RX ORDER — HYDROCODONE BITARTRATE AND ACETAMINOPHEN 5; 325 MG/1; MG/1
1 TABLET ORAL EVERY 4 HOURS PRN
Status: DISCONTINUED | OUTPATIENT
Start: 2023-11-15 | End: 2023-11-15

## 2023-11-15 RX ORDER — NICOTINE 21 MG/24HR
1 PATCH, TRANSDERMAL 24 HOURS TRANSDERMAL EVERY 24 HOURS
Status: DISCONTINUED | OUTPATIENT
Start: 2023-11-15 | End: 2023-11-18 | Stop reason: HOSPADM

## 2023-11-15 RX ORDER — POLYETHYLENE GLYCOL 3350 17 G/17G
17 POWDER, FOR SOLUTION ORAL DAILY PRN
Status: DISCONTINUED | OUTPATIENT
Start: 2023-11-15 | End: 2023-11-18 | Stop reason: HOSPADM

## 2023-11-15 RX ORDER — GUAIFENESIN 600 MG/1
600 TABLET, EXTENDED RELEASE ORAL 2 TIMES DAILY
Status: DISCONTINUED | OUTPATIENT
Start: 2023-11-15 | End: 2023-11-18 | Stop reason: HOSPADM

## 2023-11-15 RX ORDER — FUROSEMIDE 40 MG/1
40 TABLET ORAL DAILY
Status: DISCONTINUED | OUTPATIENT
Start: 2023-11-15 | End: 2023-11-18 | Stop reason: HOSPADM

## 2023-11-15 RX ORDER — DULOXETIN HYDROCHLORIDE 30 MG/1
30 CAPSULE, DELAYED RELEASE ORAL DAILY
Status: DISCONTINUED | OUTPATIENT
Start: 2023-11-15 | End: 2023-11-18 | Stop reason: HOSPADM

## 2023-11-15 RX ORDER — ONDANSETRON 2 MG/ML
4 INJECTION INTRAMUSCULAR; INTRAVENOUS EVERY 6 HOURS PRN
Status: DISCONTINUED | OUTPATIENT
Start: 2023-11-15 | End: 2023-11-18 | Stop reason: HOSPADM

## 2023-11-15 RX ORDER — LEVETIRACETAM 500 MG/1
500 TABLET ORAL DAILY
Status: DISCONTINUED | OUTPATIENT
Start: 2023-11-15 | End: 2023-11-18 | Stop reason: HOSPADM

## 2023-11-15 RX ORDER — INSULIN LISPRO 100 [IU]/ML
0-4 INJECTION, SOLUTION INTRAVENOUS; SUBCUTANEOUS NIGHTLY
Status: DISCONTINUED | OUTPATIENT
Start: 2023-11-15 | End: 2023-11-18 | Stop reason: HOSPADM

## 2023-11-15 RX ORDER — HYDROCODONE BITARTRATE AND ACETAMINOPHEN 10; 325 MG/1; MG/1
1 TABLET ORAL EVERY 4 HOURS PRN
Status: DISCONTINUED | OUTPATIENT
Start: 2023-11-15 | End: 2023-11-15

## 2023-11-15 RX ORDER — HYDROCODONE BITARTRATE AND ACETAMINOPHEN 5; 325 MG/1; MG/1
1 TABLET ORAL EVERY 4 HOURS PRN
Status: COMPLETED | OUTPATIENT
Start: 2023-11-15 | End: 2023-11-15

## 2023-11-15 RX ORDER — INSULIN LISPRO 100 [IU]/ML
0-8 INJECTION, SOLUTION INTRAVENOUS; SUBCUTANEOUS
Status: DISCONTINUED | OUTPATIENT
Start: 2023-11-15 | End: 2023-11-18 | Stop reason: HOSPADM

## 2023-11-15 RX ORDER — INSULIN GLARGINE 100 [IU]/ML
30 INJECTION, SOLUTION SUBCUTANEOUS NIGHTLY
Status: DISCONTINUED | OUTPATIENT
Start: 2023-11-15 | End: 2023-11-18 | Stop reason: HOSPADM

## 2023-11-15 RX ORDER — GINSENG 100 MG
CAPSULE ORAL 3 TIMES DAILY
Status: DISCONTINUED | OUTPATIENT
Start: 2023-11-15 | End: 2023-11-18 | Stop reason: HOSPADM

## 2023-11-15 RX ORDER — GABAPENTIN 400 MG/1
400 CAPSULE ORAL 2 TIMES DAILY
COMMUNITY
Start: 2023-11-01

## 2023-11-15 RX ORDER — SPIRONOLACTONE 25 MG/1
25 TABLET ORAL DAILY
Status: DISCONTINUED | OUTPATIENT
Start: 2023-11-15 | End: 2023-11-18 | Stop reason: HOSPADM

## 2023-11-15 RX ORDER — CARVEDILOL 3.12 MG/1
3.12 TABLET ORAL 2 TIMES DAILY WITH MEALS
Status: DISCONTINUED | OUTPATIENT
Start: 2023-11-15 | End: 2023-11-18 | Stop reason: HOSPADM

## 2023-11-15 RX ORDER — HYDROCODONE BITARTRATE AND ACETAMINOPHEN 10; 325 MG/1; MG/1
1 TABLET ORAL EVERY 4 HOURS PRN
Status: COMPLETED | OUTPATIENT
Start: 2023-11-15 | End: 2023-11-17

## 2023-11-15 RX ORDER — ATORVASTATIN CALCIUM 40 MG/1
40 TABLET, FILM COATED ORAL DAILY
Status: DISCONTINUED | OUTPATIENT
Start: 2023-11-15 | End: 2023-11-18 | Stop reason: HOSPADM

## 2023-11-15 RX ADMIN — BENZONATATE 100 MG: 100 CAPSULE ORAL at 22:37

## 2023-11-15 RX ADMIN — HYDROCODONE BITARTRATE AND ACETAMINOPHEN 1 TABLET: 5; 325 TABLET ORAL at 20:15

## 2023-11-15 RX ADMIN — ATORVASTATIN CALCIUM 40 MG: 40 TABLET, FILM COATED ORAL at 15:05

## 2023-11-15 RX ADMIN — GABAPENTIN 400 MG: 100 CAPSULE ORAL at 22:36

## 2023-11-15 RX ADMIN — BACITRACIN: 500 OINTMENT TOPICAL at 22:39

## 2023-11-15 RX ADMIN — LEVETIRACETAM 500 MG: 500 TABLET, FILM COATED ORAL at 15:05

## 2023-11-15 RX ADMIN — BACITRACIN: 500 OINTMENT TOPICAL at 16:45

## 2023-11-15 RX ADMIN — HYDROCODONE BITARTRATE AND ACETAMINOPHEN 1 TABLET: 5; 325 TABLET ORAL at 15:05

## 2023-11-15 RX ADMIN — FUROSEMIDE 40 MG: 40 TABLET ORAL at 15:05

## 2023-11-15 RX ADMIN — HYDROCODONE BITARTRATE AND ACETAMINOPHEN 1 TABLET: 5; 325 TABLET ORAL at 03:20

## 2023-11-15 RX ADMIN — DULOXETINE HYDROCHLORIDE 30 MG: 30 CAPSULE, DELAYED RELEASE ORAL at 15:05

## 2023-11-15 ASSESSMENT — ENCOUNTER SYMPTOMS
ALLERGIC/IMMUNOLOGIC NEGATIVE: 1
VOMITING: 0
DIARRHEA: 0
SHORTNESS OF BREATH: 0
EYES NEGATIVE: 1
NAUSEA: 0
COUGH: 0
GASTROINTESTINAL NEGATIVE: 1
ABDOMINAL PAIN: 0
BACK PAIN: 0
CONSTIPATION: 0
WHEEZING: 0
SHORTNESS OF BREATH: 1

## 2023-11-15 ASSESSMENT — HEART SCORE: ECG: 1

## 2023-11-15 ASSESSMENT — PAIN SCALES - GENERAL
PAINLEVEL_OUTOF10: 5
PAINLEVEL_OUTOF10: 5
PAINLEVEL_OUTOF10: 10
PAINLEVEL_OUTOF10: 5
PAINLEVEL_OUTOF10: 8

## 2023-11-15 ASSESSMENT — PAIN DESCRIPTION - LOCATION: LOCATION: CHEST

## 2023-11-15 NOTE — ED NOTES
Report sent at this time, Tina Goddard acknowledged report coming.      Rupal Eduardo RN  11/15/23 9376

## 2023-11-15 NOTE — PLAN OF CARE
Problem: Discharge Planning  Goal: Discharge to home or other facility with appropriate resources  Outcome: Progressing  Flowsheets (Taken 11/15/2023 0255)  Discharge to home or other facility with appropriate resources:   Identify barriers to discharge with patient and caregiver   Arrange for needed discharge resources and transportation as appropriate   Identify discharge learning needs (meds, wound care, etc)   Arrange for interpreters to assist at discharge as needed   Refer to discharge planning if patient needs post-hospital services based on physician order or complex needs related to functional status, cognitive ability or social support system     Problem: Pain  Goal: Verbalizes/displays adequate comfort level or baseline comfort level  Outcome: Progressing     Problem: Safety - Adult  Goal: Free from fall injury  Outcome: Progressing     Problem: ABCDS Injury Assessment  Goal: Absence of physical injury  Outcome: Progressing

## 2023-11-15 NOTE — CONSULTS
Consult    Patient: Dolores Umana MRN: [id number]  SSN: [id number]    YOB: 1950  Age: 68 y.o. Sex: male      Subjective:      Chief Complaint   Patient presents with    Loss of Consciousness        Dolores Umana is a 68 y.o. male with PMH of CAD, diabetes, GERD, heart failure, and essential HTN who is being seen for a fall and chest pain. Patient reports he had a coughing fit that resulted in him falling and hitting his head on the toilet seat with associated chest pain. Patient takes a blood thinner and did hit his head. He was admitted to general surgery's service and hospital medicine was consulted for medical management. Initial labs revealed mildly elevated BNP of 150, negative troponin x2, and a CT of the head and Cspine revealed no acute fracture, 13 mm right thyroid nodule, an old right occipital infarct, and multiple old lacunar infarcts. Subjective:  Patient seen in room. He reports feeling better but still has pain in his head where he has a cut and his chest. He has not coughed today. No further complaints. Past Medical History:   Diagnosis Date    CAD (coronary artery disease)     Diabetes ([address])     GERD (gastroesophageal reflux disease)     Heart failure ([address])     Hypertension      Past Surgical History:   Procedure Laterality Date    CORONARY ARTERY BYPASS GRAFT      4 way      No family history on file.   Social History     Tobacco Use    Smoking status: Every Day     Packs/day: .5     Types: Cigarettes    Smokeless tobacco: Never   Substance Use Topics    Alcohol use: Yes      Current Facility-Administered Medications   Medication Dose Route Frequency Provider Last Rate Last Admin    HYDROcodone-acetaminophen (NORCO)  MG per tablet 1 tablet  1 tablet Oral Q4H PRN Lore Rodriguez MD        HYDROcodone-acetaminophen (NORCO) 5-325 MG per tablet 1 tablet  1 tablet Oral Q4H PRN Abdulaziz Smith MD   1 tablet at 11/15/23 0320        No Known Allergies    Review of Ref Range    Body Surface Area 2.29 m2    LV EDV A2C 72 mL    LV EDV A4C 64 mL    LV ESV A2C 34 mL    LV ESV A4C 25 mL    IVSd 1.1 (A) 0.6 - 1.0 cm    LVIDd 4.7 4.2 - 5.9 cm    LVIDs 3.3 cm    LVOT Mean Gradient 2 mmHg    LVOT VTI 16.8 cm    LVOT Peak Velocity 0.9 m/s    LVOT Peak Gradient 3 mmHg    LVPWd 1.0 0.6 - 1.0 cm    LV Ejection Fraction A2C 54 %    LV Ejection Fraction A4C 61 %    EF BP 57 55 - 100 %    LA Minor Axis 5.0 cm    LA Major Axis 5.5 cm    LA Area 2C 11.3 cm2    LA Area 4C 18.1 cm2    LA Volume MOD A2C 21 18 - 58 mL    LA Volume MOD A4C 46 18 - 58 mL    LA Volume BP 32 18 - 58 mL    LA Diameter 4.7 cm    RA Area 4C 18.4 cm2    RA Volume 45 ml    AV Mean Gradient 3 mmHg    AV VTI 22.5 cm    AV Mean Velocity 0.8 m/s    AV Peak Velocity 1.2 m/s    AV Peak Gradient 6 mmHg    Aortic Root 3.9 cm    Ascending Aorta 3.4 cm    MR VTI 53.6 cm    MV Mean Gradient 1 mmHg    MV VTI 34.0 cm    MV Mean Velocity 0.5 m/s    MR Peak Velocity 2.1 m/s    MR Peak Gradient 18 mmHg    MV Max Velocity 1.0 m/s    MV Peak Gradient 4 mmHg    MV A Velocity 0.89 m/s    MV E Velocity 0.78 m/s    MV Area by PHT 3.0 cm2    NE Max Velocity 1.4 m/s    Pulmonary Artery EDP 7 mmHg    PV Max Velocity 0.8 m/s    PV Peak Gradient 3 mmHg    Pulm Vein Peak D Velocity 0.6 m/s    Pulm Vein Peak S Velocity 0.5 m/s    Pulm Vein S/D 1.0 no units    RV Basal Dimension 3.7 cm    RV Mid Dimension 2.6 cm    RV Free Wall Peak S' 12 cm/s    TAPSE 1.1 (A) 1.7 cm    TR Max Velocity 2.43 m/s    TR Peak Gradient 24 mmHg        Vascular duplex carotid bilateral         CT HEAD WO CONTRAST   Final Result   Multiple old lacunar infarcts. Old right occipital infarct      No acute intracranial trauma      CT CERVICAL SPINE WO CONTRAST   Final Result   No acute fracture or other acute findings. 13 mm right thyroid   nodule for which ultrasound follow-up can be considered. XR CHEST 1 VIEW   Final Result   No acute cardiopulmonary process.

## 2023-11-15 NOTE — PROGRESS NOTES
Patient without medication orders in [facility], complaining of chest pain unchanged since arrival to hospital per patient, 8/10. VSS, no signs or symptoms of distress, breathing comfortably on room air. Reached out to Dr. Negar Harrison, inquired about repeat EKG, Dr. Negar Harrison declined, received orders for norco PRN.

## 2023-11-15 NOTE — ED NOTES
STEMI alert cleared by provider after reviewing EKG performed by ED staff       Zabrina Zuniga RN  09/22/82 9773

## 2023-11-15 NOTE — CARE COORDINATION
11/15/23 3618   Service Assessment   Patient Orientation Alert and Oriented   Cognition Other (see comment)   History Provided By Patient   Primary Caregiver Other (Comment)  (Pt reports he receives some help from a Ms. Krishnan)   Accompanied By/Relationship pt alone in room   Support Systems Other (Comment)  (unable to assess)   Patient's [address] is: Patient Declined (Legal Next of [address] as Decision Maker)   PCP Verified by CM Yes  (Pt states he thinks his PCP is Dr. Terrie Link, but unsure when last seen)   Last Visit to PCP Within last 3 months   Prior Functional Level Independent in ADLs/IADLs   Current Functional Level Independent in ADLs/IADLs   Can patient return to prior living arrangement Unknown at present   Ability to make needs known: Other (see comment)  (unable to assess)   Family able to assist with home care needs: Other (comment)  (unable to assess)   Would you like for me to discuss the discharge plan with any other family members/significant others, and if so, who? Yes   Financial Resources  Resources None   CM/SW Referral Other (see comment)  (n/a)   Social/Functional History   Type of [address] [place] [address] Road None     CM met with pt at bedside to discuss dispo and verified demographics. Pt confirmed his address and referred to a \"Ms. Krishnan\" as someone that helps him at home sometimes and takes him to his appointments. CM attempted to call the emergency contact listed on chart, but vm came on and stated to  at [phone]. CM called this number and was unable to leave . AMANDA entered the information provided by pt, but unsure how accurate it may be. AMANDA asked pt about family and he stated that, \"I have family. I don't know their contact information and they don't know mine. \" Follow up may be necessary. Rx: Lacey PATEL following for dispo, anticipates may have needs. Unable to assess ACP needs at this time.

## 2023-11-15 NOTE — ED NOTES
Please contact [address] [place] [address], caregiver, when patient is admitted and has a room.      Xavier Matute RN  11/15/23 4031

## 2023-11-15 NOTE — CARE COORDINATION
5202: Chart reviewed. Per notes; ECHO and carotid duplex ordered. DCP assessment to be completed on unit.     CM will continue to follow patient and recs of medical team.

## 2023-11-15 NOTE — ED NOTES
Lab called to add on a BNP.       Batool Anderson RN  11/14/23 2136 OFFICE FOLLOW UP - Nephrology   Paddy Trevin 48 y o  male MRN: [id number]       ASSESSMENT and PLAN:  Diagnoses and all orders for this visit:    Uncontrolled secondary diabetes mellitus with stage 5 CKD (GFR<15) ([address] )  -     Renal function panel; Future  -     PTH, intact; Future  -     CBC; Future    CKD (chronic kidney disease) stage 5, GFR less than 15 ml/min (HCC)    Diabetic retinopathy of both eyes associated with type 2 diabetes mellitus, macular edema presence unspecified, unspecified retinopathy severity ([address] )    Essential hypertension    Legally blind    Hyperlipidemia, unspecified hyperlipidemia type    S/P arteriovenous (AV) fistula creation        This is a 73-year-old gentleman from [facility] with history of CKD stage 5 in the setting of poorly controlled diabetes diagnosed in 2002 with diabetes retinopathy, legally blind, diabetes nephropathy, hypertension who returns to the office for follow-up  First time patient seen was in July 2018, after that patient return to [facility] and come back to the [address]  and was found to have worsening renal function  1  Chronic kidney disease stage 5 not on dialysis yet  CKD suspected secondary to uncontrolled diabetes  Patient was referred to vascular surgery have an AV fistula created on 01/23/2019, fistula currently maturing  Fortunately patient does not have any uremic symptoms  Will continue with close follow-up, I would like to see him back in the office in 6-8 weeks with repeat labs  Discussed with patient about signs and symptoms to look for worsening kidney function and advised to go to the ER for evaluation if he develops any of those  2  Hypertension, blood pressure acceptable, continue with same antihypertensive medication  3  Uncontrolled diabetes with diabetes retinopathy and suspected diabetes nephropathy  Management per primary care  4  Anemia chronic kidney disease, will follow next CBC      5  Mineral bone disease, PTH elevated, will follow for now  6  Access, left brachiocephalic AV fistula created on 01/23/2019 by vascular surgery  Fistula with good thrill and bruit, not mature yet  Patient Instructions   I would like to see you back in the office with repeat blood test in 6-8 weeks  Remember to avoid NSAIDs (no ibuprofen, Motrin, Advil, Aleve, naproxen)  Okay to take Tylenol or paracetamol or acetaminophen as needed for pain  If you develop any episode nausea, vomiting, shortness of Breath, decreased urination, a fluid retention, or any acute changes, recommend to go to the emergency department for urgent evaluation  Continue with regular follow-up with you vascular surgeon  I want you to go to [facility] class for education  HPI: Jalyn Alvarez is a 48 y o  male who is here for No chief complaint on file       This is a gentleman with history of chronic kidney disease stage 4/5 in the setting of uncontrolled diabetes, hypertension, diabetes retinopathy and legally blind, who was initially seen for consultation on July 2018  After initial evaluation patient went back to [facility], [facility], so his primary care doctor and was found to have significant worsening kidney function with a creatinine of 10, was refer immediately to vascular surgery for AV fistula, a left brachiocephalic AV fistula was created on 01/23/2019  Today patient returns to the office for follow-up with her sister  Patient currently has no complaints at this time and is feeling well  Patient denies any chest pain, shortness of breath and swelling  The last blood work was done on 02/11/2019, which we have reviewed together  Most recent serum creatinine 9 89 with an estimated GFR of 6  Patient denies any nausea, no vomiting, no taste changes, appetite stable  Denies urinary problems  Denies NSAID use        ROS:   All the systems were reviewed and were negative except as documented on the HPI     Allergies: Patient has no known allergies  Medications:   Current Outpatient Medications:     amLODIPine (NORVASC) 10 mg tablet, Take 1 tablet (10 mg total) by mouth daily (Patient taking differently: Take 10 mg by mouth every evening  ), Disp: 90 tablet, Rfl: 0    aspirin 81 MG tablet, Take 1 tablet (81 mg total) by mouth daily, Disp: 90 tablet, Rfl: 0    atorvastatin (LIPITOR) 40 mg tablet, Take 1 tablet (40 mg total) by mouth daily, Disp: 90 tablet, Rfl: 0    Blood Glucose Monitoring Suppl (ONE TOUCH ULTRA 2) w/Device KIT, by Does not apply route 3 (three) times a day, Disp: 1 each, Rfl: 0    carvedilol (COREG) 6 25 mg tablet, Take 1 tablet (6 25 mg total) by mouth 2 (two) times a day with meals, Disp: 60 tablet, Rfl: 1    Cholecalciferol (VITAMIN D-3) 5000 units TABS, Take 1 tablet by mouth daily, Disp: , Rfl:     glipiZIDE (GLUCOTROL) 10 mg tablet, Take 1 tablet (10 mg total) by mouth 2 (two) times a day before meals, Disp: 180 tablet, Rfl: 0    glucose blood test strip, Test blood sugars three times daily  , Disp: 100 each, Rfl: 2    insulin glargine (BASAGLAR KWIKPEN) 100 units/mL injection pen, Inject 30 Units under the skin daily (Patient taking differently: Inject 30 Units under the skin daily at bedtime  ), Disp: 10 pen, Rfl: 0    ONE TOUCH LANCETS MISC, by Does not apply route 3 (three) times a day, Disp: 100 each, Rfl: 1    psyllium (METAMUCIL) 58 6 % powder, Take 1 packet by mouth daily, Disp: 30 packet, Rfl: 2    Past Medical History:   Diagnosis Date    Cataract     Chronic kidney disease     Diabetes mellitus [facility] )      IDDM    Diabetic retinopathy ([facility] )     Dizziness     occ    ESRD (end stage renal disease) ([facility] )     Headache     occ    Hypertension     Legally blind     LVH (left ventricular hypertrophy)     Preferred language is [place]     understands some English    Use of cane as ambulatory aid      Past Surgical History:   Procedure Laterality Date    INGUINAL HERNIA REPAIR Right     PA ANASTOMOSIS,AV,ANY SITE Left 1/23/2019    Procedure: CREATION FISTULA ARTERIOVENOUS (AV); Surgeon: Deidre Sánchez MD;  Location: [facility];  Service: Vascular     Family History   Problem Relation Age of Onset    Hypertension Mother     Diabetes Father     No Known Problems Sister     No Known Problems Brother     No Known Problems Maternal Aunt     No Known Problems Maternal Uncle     No Known Problems Paternal Aunt     No Known Problems Paternal Uncle     No Known Problems Maternal Grandmother     No Known Problems Maternal Grandfather     No Known Problems Paternal Grandmother     No Known Problems Paternal Grandfather       reports that he quit smoking about a year ago  He smoked 0 25 packs per day  He has never used smokeless tobacco  He reports that he drinks alcohol  He reports that he does not use drugs  Physical Exam:   Vitals:    02/13/19 1545   BP: 145/80   BP Location: Right arm   Patient Position: Sitting   Pulse: 70   Weight: 72 6 kg (160 lb)   Height: 6' (1 829 m)     Body mass index is 21 7 kg/m²      General: cooperative, in not acute distress  Eyes: conjunctivae pink, anicteric sclerae  ENT: lips and mucous membranes moist  Neck: supple, no JVD  Chest: clear breath sounds bilateral, no crackles, ronchus or wheezings  CVS: distinct S1 & S2, normal rate, regular rhythm  Abdomen: soft, non-tender, non-distended, normoactive bowel sounds  Back: no CVA tenderness  Extremities: no edema of both legs  Skin: no rash  Neuro: awake, alert, oriented      Lab Results:  Results for orders placed or performed in visit on 02/11/19   CBC   Result Value Ref Range    WBC 5 43 4 31 - 10 16 Thousand/uL    RBC 3 54 (L) 3 88 - 5 62 Million/uL    Hemoglobin 9 2 (L) 12 0 - 17 0 g/dL    Hematocrit 30 6 (L) 36 5 - 49 3 %    MCV 86 82 - 98 fL    MCH 26 0 (L) 26 8 - 34 3 pg    MCHC 30 1 (L) 31 4 - 37 4 g/dL    RDW 13 3 11 6 - 15 1 %    Platelets 720 801 - 493 Thousands/uL    MPV 9 6 8 9 - 12 7 fL   PTH, intact   Result Value Ref Range     0 (H) 18 4 - 80 1 pg/mL   Renal function panel   Result Value Ref Range    Albumin 2 5 (L) 3 5 - 5 0 g/dL    Calcium 8 9 8 3 - 10 1 mg/dL    Phosphorus 4 8 (H) 2 7 - 4 5 mg/dL    BUN 62 (H) 5 - 25 mg/dL    Creatinine 9 89 (H) 0 60 - 1 30 mg/dL    Sodium 139 136 - 145 mmol/L    Potassium 5 1 3 5 - 5 3 mmol/L    Chloride 109 (H) 100 - 108 mmol/L    CO2 19 (L) 21 - 32 mmol/L    ANION GAP 11 4 - 13 mmol/L    eGFR 6 ml/min/1 73sq m    Glucose, Fasting 142 (H) 65 - 99 mg/dL   PSA, Total Screen   Result Value Ref Range    PSA 0 5 0 0 - 4 0 ng/mL       Results from last 7 days   Lab Units 02/11/19  0856   WBC Thousand/uL 5 43   HEMOGLOBIN g/dL 9 2*   HEMATOCRIT % 30 6*   PLATELETS Thousands/uL 216   SODIUM mmol/L 139   POTASSIUM mmol/L 5 1   CHLORIDE mmol/L 109*   CO2 mmol/L 19*   BUN mg/dL 62*   CREATININE mg/dL 9 89*   CALCIUM mg/dL 8 9   PHOSPHORUS mg/dL 4 8*           Portions of the record may have been created with voice recognition software  Occasional wrong word or "sound a like" substitutions may have occurred due to the inherent limitations of voice recognition software  Read the chart carefully and recognize, using context, where substitutions have occurred  If you have any questions, please contact the dictating provider

## 2023-11-15 NOTE — H&P
Trauma H&P    Patient: Simarn Modi MRN: [id number]  SSN:     YOB: 1950  Age: 68 y.o. Sex: male      Subjective:      Simran Modi is a 68 y.o. male who is with a PMH of CAD, CHF, hx of CABG who presents s/p syncope and fall at home, on Eliquis. Patient arrived to ED as trauma bravo. STEMI alert also called d/t c/o chest pain, which was cleared after review of EKG. Patient reports chest pain with nausea and vomiting last night, leading him to the bathroom where he had a syncopal episode and hit his head on the toilet. Patient reports prior hx of syncope. Reports pmh of MI in 2017, diabetes, CAD, GERD, CHF, HTN. Psh includes CABG. ECHO 11/15 EF 55-60%    Vascular US duplex carotid b/l 11/15 completed, results pending. CT cervical spine wo contrast 11/14: IMPRESSION:  No acute fracture or other acute findings. 13 mm right thyroid  nodule for which ultrasound follow-up can be considered. CT head wo contrast 11/14: IMPRESSION:  Multiple old lacunar infarcts. Old right occipital infarct     No acute intracranial trauma  CXR 11/14 revealed no acute cardiopulmonary process.   Troponin 10      Past Medical History:   Diagnosis Date    CAD (coronary artery disease)     Diabetes ([address])     GERD (gastroesophageal reflux disease)     Heart failure ([address])     Hypertension      Past Surgical History:   Procedure Laterality Date    CORONARY ARTERY BYPASS GRAFT      4 way      No family history on file.   Social History     Tobacco Use    Smoking status: Every Day     Packs/day: .5     Types: Cigarettes    Smokeless tobacco: Never   Substance Use Topics    Alcohol use: Yes    Drug use: Not Currently      Current Facility-Administered Medications   Medication Dose Route Frequency Provider Last Rate Last Admin    HYDROcodone-acetaminophen (NORCO)  MG per tablet 1 tablet  1 tablet Oral Q4H PRN Benjamin Lovett MD        HYDROcodone-acetaminophen (NORCO) 5-325 MG per tablet 1 cm/s    Right cca dist PSV 55.7 cm/s    Right CCA dist EDV 8.9 cm/s    Right CCA prox PSV 85.5 cm/s    Right CCA prox EDV 11.5 cm/s    Right ICA dist PSV 78.8 cm/s    Right ICA dist EDV 18.6 cm/s    Right ICA prox PSV 81.1 cm/s    Right ICA prox EDV 15.8 cm/s    Right ECA PSV 96.9 cm/s    Right ECA EDV 9.62 cm/s    Right subclavian prox PSV 90.1 cm/s    Right subclavian prox EDV 0.0 cm/s    Right vertebral PSV 50.9 cm/s    Right vertebral EDV 8.94 cm/s    Body Surface Area 2.29 m2    Right ICA/CCA PSV 1.46     Left ICA/CCA PSV 1.30    Echo (TTE) complete (PRN contrast/bubble/strain/3D)    Collection Time: 11/15/23 12:12 PM   Result Value Ref Range    Body Surface Area 2.29 m2    LV EDV A2C 72 mL    LV EDV A4C 64 mL    LV ESV A2C 34 mL    LV ESV A4C 25 mL    IVSd 1.1 (A) 0.6 - 1.0 cm    LVIDd 4.7 4.2 - 5.9 cm    LVIDs 3.3 cm    LVOT Mean Gradient 2 mmHg    LVOT VTI 16.8 cm    LVOT Peak Velocity 0.9 m/s    LVOT Peak Gradient 3 mmHg    LVPWd 1.0 0.6 - 1.0 cm    LV Ejection Fraction A2C 54 %    LV Ejection Fraction A4C 61 %    EF BP 57 55 - 100 %    LA Minor Axis 5.0 cm    LA Major Axis 5.5 cm    LA Area 2C 11.3 cm2    LA Area 4C 18.1 cm2    LA Volume MOD A2C 21 18 - 58 mL    LA Volume MOD A4C 46 18 - 58 mL    LA Volume BP 32 18 - 58 mL    LA Diameter 4.7 cm    RA Area 4C 18.4 cm2    RA Volume 45 ml    AV Mean Gradient 3 mmHg    AV VTI 22.5 cm    AV Mean Velocity 0.8 m/s    AV Peak Velocity 1.2 m/s    AV Peak Gradient 6 mmHg    Aortic Root 3.9 cm    Ascending Aorta 3.4 cm    MR VTI 53.6 cm    MV Mean Gradient 1 mmHg    MV VTI 34.0 cm    MV Mean Velocity 0.5 m/s    MR Peak Velocity 2.1 m/s    MR Peak Gradient 18 mmHg    MV Max Velocity 1.0 m/s    MV Peak Gradient 4 mmHg    MV A Velocity 0.89 m/s    MV E Velocity 0.78 m/s    TX Max Velocity 1.4 m/s    Pulmonary Artery EDP 7 mmHg    PV Max Velocity 0.8 m/s    PV Peak Gradient 3 mmHg    Pulm Vein Peak D Velocity 0.6 m/s    Pulm Vein Peak S Velocity 0.5 m/s    Pulm Vein S/D

## 2023-11-15 NOTE — PROGRESS NOTES
4 Eyes Skin Assessment     NAME:  Nacho Saini  YOB: 1950  MEDICAL RECORD NUMBER:  [id number]    The patient is being assessed for  Admission    I agree that at least one RN has performed a thorough Head to Toe Skin Assessment on the patient. ALL assessment sites listed below have been assessed. Areas assessed by both nurses:    Head, Face, Ears, Shoulders, Back, Chest, Arms, Elbows, Hands, Sacrum. Buttock, Coccyx, Ischium, Legs. Feet and Heels, and Under Medical Devices         Does the Patient have a Wound? Yes wound(s) were present on assessment.  LDA wound assessment was Initiated and completed by RN       Aaron Prevention initiated by RN: Yes  Wound Care Orders initiated by RN: Yes    Pressure Injury (Stage 3,4, Unstageable, DTI, NWPT, and Complex wounds) if present, place Wound referral order by RN under : No    New Ostomies, if present place, Ostomy referral order under : No     Nurse 1 eSignature: Electronically signed by Clive Hartman RN on 11/15/23 at 3:07 AM EST    **SHARE this note so that the co-signing nurse can place an eSignature**    Nurse 2 eSignature: Electronically signed by Samuel Landin RN on 11/15/23 at 3:09 AM EST

## 2023-11-15 NOTE — ED TRIAGE NOTES
Pt was in the bathroom had a coughing spell and fell and hit his head. Pt takes eliquis. Pt told ems he has been having chest pain. Pt did smoke some marijuana tonight. Cp started roughly 1 hours ago.  Trauma bravo and stemi activated PTA

## 2023-11-15 NOTE — ED PROVIDER NOTES
[facility] EMERGENCY DEPT  EMERGENCY DEPARTMENT HISTORY AND PHYSICAL EXAM      Date: 11/14/2023  Patient Name: Moraima Tapia  MRN: [id number]  [facility] 1950  Date of evaluation: 11/14/2023  Provider: Bianca Jama DO   Note Started: 9:32 PM EST 11/14/23    HISTORY OF PRESENT ILLNESS     Chief Complaint   Patient presents with    Loss of Consciousness     History Provided By: Patient, EMS    HPI: Moraima Tapia is a 68 y.o. male with past medical history of CAD, diabetes, GERD, heart failure, and hypertension who presents with fall. Patient is also been having chest pain. Patient had a fall after coughing today. He did hit his head. He is taking a blood thinner. He also started having chest pain around 8 PM.  EMS concerned about possible STEMI. STEMI alert was called. This was canceled when the patient arrived after EKG was reviewed by myself. PAST MEDICAL HISTORY   Past Medical History:  Past Medical History:   Diagnosis Date    CAD (coronary artery disease)     Diabetes ([address])     GERD (gastroesophageal reflux disease)     Heart failure ([address])     Hypertension        Past Surgical History:  Past Surgical History:   Procedure Laterality Date    CORONARY ARTERY BYPASS GRAFT      4 way       Family History:  No family history on file. Social History:  Social History     Tobacco Use    Smoking status: Every Day     Packs/day: .5     Types: Cigarettes    Smokeless tobacco: Never   Substance Use Topics    Alcohol use: Yes    Drug use: Not Currently       Allergies:  No Known Allergies    PCP: SAGE Salmon NP    Current Meds:   No current facility-administered medications for this encounter. Current Outpatient Medications   Medication Sig Dispense Refill    Lancets MISC Please measure blood glucose 2 times per day.  DX Code E11.9      acetaminophen (TYLENOL) 325 MG tablet TAKE 3 TABLETS BY MOUTH EVERY 8 HOURS FOR 14 DAYS. (GENERIC FOR TYLENOL)      apixaban (ELIQUIS) 5 MG TABS tablet

## 2023-11-16 ENCOUNTER — APPOINTMENT (OUTPATIENT)
Facility: HOSPITAL | Age: 73
DRG: 204 | End: 2023-11-16
Attending: PHYSICIAN ASSISTANT
Payer: COMMERCIAL

## 2023-11-16 ENCOUNTER — APPOINTMENT (OUTPATIENT)
Facility: HOSPITAL | Age: 73
DRG: 204 | End: 2023-11-16
Payer: COMMERCIAL

## 2023-11-16 PROBLEM — I26.99 PULMONARY EMBOLISM (HCC): Status: ACTIVE | Noted: 2023-11-16

## 2023-11-16 LAB
ALBUMIN SERPL-MCNC: 3 G/DL (ref 3.5–5)
ALBUMIN/GLOB SERPL: 0.8 (ref 1.1–2.2)
ALP SERPL-CCNC: 91 U/L (ref 45–117)
ALT SERPL-CCNC: 23 U/L (ref 12–78)
ANION GAP SERPL CALC-SCNC: 3 MMOL/L (ref 5–15)
APTT PPP: 26.5 SEC (ref 21.2–34.1)
APTT PPP: 92.6 SEC (ref 21.2–34.1)
APTT PPP: >153 SEC (ref 21.2–34.1)
AST SERPL W P-5'-P-CCNC: 13 U/L (ref 15–37)
BILIRUB SERPL-MCNC: 0.5 MG/DL (ref 0.2–1)
BUN SERPL-MCNC: 8 MG/DL (ref 6–20)
BUN/CREAT SERPL: 11 (ref 12–20)
CA-I BLD-MCNC: 9 MG/DL (ref 8.5–10.1)
CHLORIDE SERPL-SCNC: 106 MMOL/L (ref 97–108)
CO2 SERPL-SCNC: 29 MMOL/L (ref 21–32)
CREAT SERPL-MCNC: 0.74 MG/DL (ref 0.7–1.3)
ECHO BSA: 2.29 M2
ECHO BSA: 2.29 M2
EKG ATRIAL RATE: 71 BPM
EKG DIAGNOSIS: NORMAL
EKG P AXIS: 46 DEGREES
EKG P-R INTERVAL: 172 MS
EKG Q-T INTERVAL: 436 MS
EKG QRS DURATION: 138 MS
EKG QTC CALCULATION (BAZETT): 473 MS
EKG R AXIS: -71 DEGREES
EKG T AXIS: 26 DEGREES
EKG VENTRICULAR RATE: 71 BPM
ERYTHROCYTE [DISTWIDTH] IN BLOOD BY AUTOMATED COUNT: 12.6 % (ref 11.5–14.5)
EST. AVERAGE GLUCOSE BLD GHB EST-MCNC: 171 MG/DL
GLOBULIN SER CALC-MCNC: 3.6 G/DL (ref 2–4)
GLUCOSE BLD STRIP.AUTO-MCNC: 122 MG/DL (ref 65–100)
GLUCOSE BLD STRIP.AUTO-MCNC: 139 MG/DL (ref 65–100)
GLUCOSE BLD STRIP.AUTO-MCNC: 143 MG/DL (ref 65–100)
GLUCOSE BLD STRIP.AUTO-MCNC: 150 MG/DL (ref 65–100)
GLUCOSE BLD STRIP.AUTO-MCNC: 194 MG/DL (ref 65–100)
GLUCOSE SERPL-MCNC: 139 MG/DL (ref 65–100)
HBA1C MFR BLD: 7.6 % (ref 4–5.6)
HCT VFR BLD AUTO: 43.8 % (ref 36.6–50.3)
HGB BLD-MCNC: 14.7 G/DL (ref 12.1–17)
INR PPP: 1 (ref 0.9–1.1)
MCH RBC QN AUTO: 30.1 PG (ref 26–34)
MCHC RBC AUTO-ENTMCNC: 33.6 G/DL (ref 30–36.5)
MCV RBC AUTO: 89.6 FL (ref 80–99)
NRBC # BLD: 0 K/UL (ref 0–0.01)
NRBC BLD-RTO: 0 PER 100 WBC
PERFORMED BY:: ABNORMAL
PLATELET # BLD AUTO: 176 K/UL (ref 150–400)
PMV BLD AUTO: 10.6 FL (ref 8.9–12.9)
POTASSIUM SERPL-SCNC: 3.7 MMOL/L (ref 3.5–5.1)
PROT SERPL-MCNC: 6.6 G/DL (ref 6.4–8.2)
PROTHROMBIN TIME: 13.3 SEC (ref 11.9–14.6)
RBC # BLD AUTO: 4.89 M/UL (ref 4.1–5.7)
SODIUM SERPL-SCNC: 138 MMOL/L (ref 136–145)
THERAPEUTIC RANGE: ABNORMAL SEC (ref 82–109)
THERAPEUTIC RANGE: ABNORMAL SEC (ref 82–109)
THERAPEUTIC RANGE: NORMAL SEC (ref 82–109)
UFH PPP CHRO-ACNC: 0.58 IU/ML
VAS LEFT CCA DIST EDV: 8.3 CM/S
VAS LEFT CCA DIST PSV: 57.7 CM/S
VAS LEFT CCA PROX EDV: 13.1 CM/S
VAS LEFT CCA PROX PSV: 83.2 CM/S
VAS LEFT ECA EDV: 1.37 CM/S
VAS LEFT ECA PSV: 95.6 CM/S
VAS LEFT ICA DIST EDV: 13.9 CM/S
VAS LEFT ICA DIST PSV: 73.1 CM/S
VAS LEFT ICA PROX EDV: 17.9 CM/S
VAS LEFT ICA PROX PSV: 74.9 CM/S
VAS LEFT ICA/CCA PSV: 1.3
VAS LEFT SUBCLAVIAN PROX EDV: 0 CM/S
VAS LEFT SUBCLAVIAN PROX PSV: 92.1 CM/S
VAS LEFT VERTEBRAL EDV: 7.56 CM/S
VAS LEFT VERTEBRAL PSV: 45.4 CM/S
VAS RIGHT CCA DIST EDV: 8.9 CM/S
VAS RIGHT CCA DIST PSV: 55.7 CM/S
VAS RIGHT CCA PROX EDV: 11.5 CM/S
VAS RIGHT CCA PROX PSV: 85.5 CM/S
VAS RIGHT ECA EDV: 9.62 CM/S
VAS RIGHT ECA PSV: 96.9 CM/S
VAS RIGHT ICA DIST EDV: 18.6 CM/S
VAS RIGHT ICA DIST PSV: 78.8 CM/S
VAS RIGHT ICA PROX EDV: 15.8 CM/S
VAS RIGHT ICA PROX PSV: 81.1 CM/S
VAS RIGHT ICA/CCA PSV: 1.46
VAS RIGHT SUBCLAVIAN PROX EDV: 0 CM/S
VAS RIGHT SUBCLAVIAN PROX PSV: 90.1 CM/S
VAS RIGHT VERTEBRAL EDV: 8.94 CM/S
VAS RIGHT VERTEBRAL PSV: 50.9 CM/S
WBC # BLD AUTO: 7.3 K/UL (ref 4.1–11.1)

## 2023-11-16 PROCEDURE — 6370000000 HC RX 637 (ALT 250 FOR IP): Performed by: PHYSICIAN ASSISTANT

## 2023-11-16 PROCEDURE — 6360000002 HC RX W HCPCS: Performed by: INTERNAL MEDICINE

## 2023-11-16 PROCEDURE — 71275 CT ANGIOGRAPHY CHEST: CPT

## 2023-11-16 PROCEDURE — 1100000000 HC RM PRIVATE

## 2023-11-16 PROCEDURE — 6370000000 HC RX 637 (ALT 250 FOR IP): Performed by: INTERNAL MEDICINE

## 2023-11-16 PROCEDURE — 36415 COLL VENOUS BLD VENIPUNCTURE: CPT

## 2023-11-16 PROCEDURE — 99232 SBSQ HOSP IP/OBS MODERATE 35: CPT | Performed by: SURGERY

## 2023-11-16 PROCEDURE — 85610 PROTHROMBIN TIME: CPT

## 2023-11-16 PROCEDURE — 82962 GLUCOSE BLOOD TEST: CPT

## 2023-11-16 PROCEDURE — 80053 COMPREHEN METABOLIC PANEL: CPT

## 2023-11-16 PROCEDURE — 85027 COMPLETE CBC AUTOMATED: CPT

## 2023-11-16 PROCEDURE — 93880 EXTRACRANIAL BILAT STUDY: CPT | Performed by: SURGERY

## 2023-11-16 PROCEDURE — 85730 THROMBOPLASTIN TIME PARTIAL: CPT

## 2023-11-16 PROCEDURE — 85520 HEPARIN ASSAY: CPT

## 2023-11-16 PROCEDURE — 93970 EXTREMITY STUDY: CPT

## 2023-11-16 PROCEDURE — 6360000004 HC RX CONTRAST MEDICATION: Performed by: INTERNAL MEDICINE

## 2023-11-16 RX ORDER — HEPARIN SODIUM 1000 [USP'U]/ML
80 INJECTION, SOLUTION INTRAVENOUS; SUBCUTANEOUS PRN
Status: DISCONTINUED | OUTPATIENT
Start: 2023-11-16 | End: 2023-11-18 | Stop reason: HOSPADM

## 2023-11-16 RX ORDER — PREDNISOLONE ACETATE 10 MG/ML
1 SUSPENSION/ DROPS OPHTHALMIC EVERY 4 HOURS PRN
Status: DISCONTINUED | OUTPATIENT
Start: 2023-11-16 | End: 2023-11-18 | Stop reason: HOSPADM

## 2023-11-16 RX ORDER — HEPARIN SODIUM 10000 [USP'U]/100ML
5-30 INJECTION, SOLUTION INTRAVENOUS CONTINUOUS
Status: DISCONTINUED | OUTPATIENT
Start: 2023-11-16 | End: 2023-11-17

## 2023-11-16 RX ORDER — HEPARIN SODIUM 1000 [USP'U]/ML
40 INJECTION, SOLUTION INTRAVENOUS; SUBCUTANEOUS PRN
Status: DISCONTINUED | OUTPATIENT
Start: 2023-11-16 | End: 2023-11-18 | Stop reason: HOSPADM

## 2023-11-16 RX ORDER — HEPARIN SODIUM 1000 [USP'U]/ML
80 INJECTION, SOLUTION INTRAVENOUS; SUBCUTANEOUS ONCE
Status: COMPLETED | OUTPATIENT
Start: 2023-11-16 | End: 2023-11-16

## 2023-11-16 RX ADMIN — DULOXETINE HYDROCHLORIDE 30 MG: 30 CAPSULE, DELAYED RELEASE ORAL at 09:19

## 2023-11-16 RX ADMIN — BACITRACIN: 500 OINTMENT TOPICAL at 20:46

## 2023-11-16 RX ADMIN — HEPARIN SODIUM AND DEXTROSE 15 UNITS/KG/HR: 10000; 5 INJECTION INTRAVENOUS at 13:30

## 2023-11-16 RX ADMIN — LEVETIRACETAM 500 MG: 500 TABLET, FILM COATED ORAL at 09:20

## 2023-11-16 RX ADMIN — GABAPENTIN 400 MG: 100 CAPSULE ORAL at 09:20

## 2023-11-16 RX ADMIN — HYDROCODONE BITARTRATE AND ACETAMINOPHEN 1 TABLET: 10; 325 TABLET ORAL at 13:54

## 2023-11-16 RX ADMIN — GABAPENTIN 400 MG: 100 CAPSULE ORAL at 20:44

## 2023-11-16 RX ADMIN — PANTOPRAZOLE SODIUM 40 MG: 40 TABLET, DELAYED RELEASE ORAL at 05:12

## 2023-11-16 RX ADMIN — FUROSEMIDE 40 MG: 40 TABLET ORAL at 09:20

## 2023-11-16 RX ADMIN — ATORVASTATIN CALCIUM 40 MG: 40 TABLET, FILM COATED ORAL at 09:20

## 2023-11-16 RX ADMIN — BACITRACIN: 500 OINTMENT TOPICAL at 09:20

## 2023-11-16 RX ADMIN — GUAIFENESIN 600 MG: 600 TABLET, EXTENDED RELEASE ORAL at 20:44

## 2023-11-16 RX ADMIN — IOPAMIDOL 100 ML: 755 INJECTION, SOLUTION INTRAVENOUS at 01:19

## 2023-11-16 RX ADMIN — HEPARIN SODIUM AND DEXTROSE 18 UNITS/KG/HR: 10000; 5 INJECTION INTRAVENOUS at 05:05

## 2023-11-16 RX ADMIN — HYDROCODONE BITARTRATE AND ACETAMINOPHEN 1 TABLET: 10; 325 TABLET ORAL at 20:44

## 2023-11-16 RX ADMIN — BACITRACIN: 500 OINTMENT TOPICAL at 13:53

## 2023-11-16 RX ADMIN — GUAIFENESIN 600 MG: 600 TABLET, EXTENDED RELEASE ORAL at 09:20

## 2023-11-16 RX ADMIN — HEPARIN SODIUM 8170 UNITS: 1000 INJECTION INTRAVENOUS; SUBCUTANEOUS at 05:00

## 2023-11-16 ASSESSMENT — PAIN DESCRIPTION - DESCRIPTORS
DESCRIPTORS: ACHING;THROBBING
DESCRIPTORS: ACHING;THROBBING

## 2023-11-16 ASSESSMENT — ENCOUNTER SYMPTOMS
WHEEZING: 0
COUGH: 0
VOMITING: 0
DIARRHEA: 0
NAUSEA: 0
BACK PAIN: 0
SHORTNESS OF BREATH: 0

## 2023-11-16 ASSESSMENT — PAIN SCALES - GENERAL
PAINLEVEL_OUTOF10: 8
PAINLEVEL_OUTOF10: 8
PAINLEVEL_OUTOF10: 0
PAINLEVEL_OUTOF10: 7
PAINLEVEL_OUTOF10: 7

## 2023-11-16 ASSESSMENT — PAIN DESCRIPTION - LOCATION
LOCATION: CHEST;HEAD;FOOT
LOCATION: HEAD;FOOT
LOCATION: FOOT

## 2023-11-16 ASSESSMENT — PAIN DESCRIPTION - ORIENTATION
ORIENTATION: RIGHT;LEFT;ANTERIOR
ORIENTATION: RIGHT;LEFT;ANTERIOR

## 2023-11-16 ASSESSMENT — PAIN DESCRIPTION - PAIN TYPE
TYPE: ACUTE PAIN
TYPE: ACUTE PAIN

## 2023-11-16 NOTE — PROGRESS NOTES
Patient found sitting on side of the bed with projectile emesis, stated it started due to coughing and that the coughing was exacerbating chest pain persisting from when he first came to the ED this admission. Assured patient safety sitting on side of bed, once vomiting ceased gathered vitals and assisted patient back to bed. Called Dr. Domonique Joyce, received orders for zofran IV, d-dimer and troponin labs, and repeat EKG.

## 2023-11-16 NOTE — PROGRESS NOTES
Nursing reached out to informed that patient has vomiting and continue to have cough and chest pain. Troponin negative, EKG unchanged. Given persistent chest pain and syncope, d-dimer ordered to evaluate for pulmonary embolism. D-dimer positive, CT-PE ordered, positive for segmental pulmonary embolism. Heparin ggt started.      Vianney Marshall MD  Internal Medicine  3:08 AM   11/16/23

## 2023-11-16 NOTE — PROGRESS NOTES
Heparin Infusion Initiation  Dolores Umana is a 68 y.o. male starting heparin for:  PE  Heparin dosing: order for weight based protocol  Initial Dosing Weight: 102.1 kg (Recorded body weight)  Recent Labs     11/14/23 2134      HGB 14.1     Factor Xa inhibitor/LMWH use within the past 72 hours? Yes  If yes, date and time of last administration:  11/14/23 @ 0900  Hypertriglyceridemia (> 690 mg/dL) or hyperbilirubinemia (> 37 mg/dL conjugated bilirubin, >14 mg/dL unconjugated bilirubin) present? No  Recent Labs     11/14/23 2134   BILITOT 0.3       Assessment/Plan:   Heparin to be monitored using aPTT until 72 hours following last factor Xa inhibitor/LMWH administration, anticipated date for change to anti-Xa monitoring is 11/17/23  Initial bolus ordered: Yes  Starting rate:  18 unit/kg/hr  PRN boluses entered:  Yes

## 2023-11-16 NOTE — PROGRESS NOTES
Gradient 2 mmHg    LVOT VTI 16.8 cm    LVOT Peak Velocity 0.9 m/s    LVOT Peak Gradient 3 mmHg    LVPWd 1.0 0.6 - 1.0 cm    LV Ejection Fraction A2C 54 %    LV Ejection Fraction A4C 61 %    EF BP 57 55 - 100 %    LA Minor Axis 5.0 cm    LA Major Axis 5.5 cm    LA Area 2C 11.3 cm2    LA Area 4C 18.1 cm2    LA Volume MOD A2C 21 18 - 58 mL    LA Volume MOD A4C 46 18 - 58 mL    LA Volume BP 32 18 - 58 mL    LA Diameter 4.7 cm    RA Area 4C 18.4 cm2    RA Volume 45 ml    AV Mean Gradient 3 mmHg    AV VTI 22.5 cm    AV Mean Velocity 0.8 m/s    AV Peak Velocity 1.2 m/s    AV Peak Gradient 6 mmHg    Aortic Root 3.9 cm    Ascending Aorta 3.4 cm    MR VTI 53.6 cm    MV Mean Gradient 1 mmHg    MV VTI 34.0 cm    MV Mean Velocity 0.5 m/s    MR Peak Velocity 2.1 m/s    MR Peak Gradient 18 mmHg    MV Max Velocity 1.0 m/s    MV Peak Gradient 4 mmHg    MV A Velocity 0.89 m/s    MV E Velocity 0.78 m/s    GA Max Velocity 1.4 m/s    Pulmonary Artery EDP 7 mmHg    PV Max Velocity 0.8 m/s    PV Peak Gradient 3 mmHg    Pulm Vein Peak D Velocity 0.6 m/s    Pulm Vein Peak S Velocity 0.5 m/s    Pulm Vein S/D 0.8 no units    RV Basal Dimension 3.7 cm    RV Mid Dimension 2.6 cm    RV Free Wall Peak S' 12 cm/s    TAPSE 1.1 1.7 cm    TR Max Velocity 2.43 m/s    TR Peak Gradient 24 mmHg    Fractional Shortening 2D 30 28 - 44 %    LV ESV Index A4C 11 mL/m2    LV EDV Index A4C 28 mL/m2    LV ESV Index A2C 15 mL/m2    LV EDV Index A2C 32 mL/m2    LVIDd Index 2.08 cm/m2    LVIDs Index 1.46 cm/m2    LV RWT Ratio 0.43     LV Mass 2D 175.8 88 - 224 g    LV Mass 2D Index 77.8 49 - 115 g/m2    MV E/A 0.88     LA Volume Index BP 14 (A) 16 - 34 ml/m2    LA Volume Index MOD A2C 9 (A) 16 - 34 ml/m2    LA Volume Index MOD A4C 20 16 - 34 ml/m2    LA Size Index 2.08 cm/m2    LA/AO Root Ratio 1.21     RA Volume Index A4C 20 mL/m2    Ao Root Index 1.73 cm/m2    Ascending Aorta Index 1.50 cm/m2    AV Velocity Ratio 0.75     LVOT:AV VTI Index 0.75     MV:LVOT VTI Result Value Ref Range    Protime 13.3 11.9 - 14.6 sec    INR 1.0 0.9 - 1.1     POCT Glucose    Collection Time: 11/16/23  8:23 AM   Result Value Ref Range    POC Glucose 150 (H) 65 - 100 mg/dL    Performed by: Stephon Baugh (Float [place])           Assessment:     Principal Problem:    Syncope, unspecified syncope type  Active Problems:    Pulmonary embolism ([address])  Resolved Problems:    * No resolved hospital problems. *        Plan:     Admitted to trauma d/t trauma bravo, s/p fall on Eliquis. Dysphagia- soft and bite sized diet  Home medications ordered   Pain control  DVT prophylaxis  PT/OT  Medical consulted for management of chronic medical conditions  CTA revealed segmental pulmonary embolism, heparin ggt started. Okay to transfer to medical service, no surgical intervention needed at this time.     Signed By: Chris Canavan     November 16, 2023

## 2023-11-16 NOTE — CONSULTS
Hematology Oncology Consultation    Reason for consult: PE    Admitting Diagnosis: Syncope and collapse [R55]  Syncope, unspecified syncope type [R55]  Chest pain, unspecified type [R07.9]  Acute cough [R05.1]    Impression:     PE  -small LLL segemental thrombosis  -states good eliquis compliance  -transtion to xarelto as OP  -tobacco abuse and immobility continues to predispose to repeat thrombosis  -on heparin gtt    2. Syncope  -related to coughing fit, likely vagal in nature, unclear if othostasis also contributed    3. Trauma from syncope    Ok to release once stable  Followup with PCP for xarelto management at discharge; if they need heme assistance they can refer to our office  Transition to xarelto in am if no major cbc changes on heparin; will need to remain on life long anticoagulation as long as medically safe on this  Feel free to call with questions will sign off    Discussion: Papi Leroy is a  68y.o. year old seen in consultation at the request of Dr. Pete Chiang for PE. He presented following a fall with head trauma in the bathroom. Syncope precipitated by coughing fit. Imaging for trauma fairly negative. Incidental PE noted that was felt to be acute. He reports good compliance with his eliquis but with general immobility and tobacco abuse remains hypercoagulable  No bleeding on this agent. No other falls or other concerning events    Imaging:    reviewed    Labs:        History:  Past Medical History:   Diagnosis Date    CAD (coronary artery disease)     Diabetes ([address])     DVT (deep venous thrombosis) (HCC)     GERD (gastroesophageal reflux disease)     Heart failure (HCC)     Hx of blood clots     Hypertension     Pulmonary embolism (HCC)       Past Surgical History:   Procedure Laterality Date    CORONARY ARTERY BYPASS GRAFT      4 way      Prior to Admission medications    Medication Sig Start Date End Date Taking?  Authorizing Provider   atorvastatin (LIPITOR) 40 MG tablet Take 1 tablet by

## 2023-11-16 NOTE — H&P
prophylaxis  PT/OT  Medical consulted for management of chronic medical conditions, will transfer to their service in the AM if no additional injuries or traumatic issues noted  Bacitracin ointment for left forehead abrasion  Continue to monitor    Signed By: Synthia Romberg Deters, DO     November 15, 2023

## 2023-11-16 NOTE — PROGRESS NOTES
OT eval order received and acknowledged. OT eval attempted at 8:32 AM, however, per chart review pt has acute PE and per RN pt started heparin drip at 5:00 AM. OT will continue to follow and attempt eval when pt is medically appropriate. Thank you.

## 2023-11-16 NOTE — PROGRESS NOTES
PT orders received, chart reviewed, patient has acute PE and heparin started at 5:00 AM we will wait until patient is therapeutic to work with. Thank you for the consult.

## 2023-11-16 NOTE — PROGRESS NOTES
Hospitalist Progress Note    NAME:   Chris Marcano   : 1950   MRN: [id number]     Date/Time: 2023 10:47 AM  Patient PCP: SAGE Baldwin NP    Estimated discharge date:48 hours  Barriers: Clinical improvement, OAC selection as patient failed eliquis therapy, PE improvement, heme/onc consult      Assessment / Plan:  Ground level fall, initial encounter  Likely secondary to vasovagal event during coughing fit  CT head negative for intracranial hemorrhage, consider repeat if change in mental status given history of blood thinners  ECHO with EF 55-60%  Carotid duplex negative except for 40% luminal compromise of right proximal ICA  Orthostatic blood pressure positive for orthostasis  TSH normal  Wound care per surgery  Pain meds PRN  Patient on keppra at home, not able to tell me why, will continue for seizure prophylaxis  Pulmonary embolism with chest pain  Dimer elevated overnight, CTA chest revealed acute segmental left lower lobe PE  Troponin negative x2  EKG without ST changes    ECHO with EF 55-60%  Heparin ggt  Heme/onc consult as patient has acute PE despite outpatient eliquis adherence  Vasc duplex of legs ordered to rule out DVT  Diabetes mellitus type II  Continue lantus 30 units nightly  Sliding scale insulin, accuchecks  A1c pending  GERD  Continue protonix  Essential HTN  Hold home antihypertensives including coreg 3.125 BID, lisinopril 10 mg, and spironolactone 25 mg due to borderline hypotension  Tobacco use disorder  Encourage cessation  Nicotine patch  Orthostatic hypotension  Orthostatic blood pressure revealed >20 mmHg drop in SBP  Likely contributed to initial fall  Discussed importance of lifestyle changes with orthostatic hypotension  Diastolic heart failure    ECHO in  with EF 60-65%, repeat with EF 55-60%  Lasix 40 mg continue for now  Holding spironolactone due to currently normotensive      Medical Decision Making:   I personally reviewed

## 2023-11-16 NOTE — CARE COORDINATION
9744: Chart reviewed. Per notes; patient on hep gtt. PT/OT evals pending discharge recs.     CM will continue to follow patient and recs of medical team.

## 2023-11-17 PROBLEM — R55 SYNCOPE, UNSPECIFIED SYNCOPE TYPE: Status: RESOLVED | Noted: 2023-11-15 | Resolved: 2023-11-17

## 2023-11-17 PROBLEM — I95.1 ORTHOSTATIC HYPOTENSION: Status: ACTIVE | Noted: 2022-01-10

## 2023-11-17 LAB
ALBUMIN SERPL-MCNC: 3.5 G/DL (ref 3.5–5)
ALBUMIN/GLOB SERPL: 0.9 (ref 1.1–2.2)
ALP SERPL-CCNC: 105 U/L (ref 45–117)
ALT SERPL-CCNC: 26 U/L (ref 12–78)
ANION GAP SERPL CALC-SCNC: 6 MMOL/L (ref 5–15)
APTT PPP: 34.3 SEC (ref 21.2–34.1)
APTT PPP: 59 SEC (ref 21.2–34.1)
APTT PPP: >153 SEC (ref 21.2–34.1)
AST SERPL W P-5'-P-CCNC: 16 U/L (ref 15–37)
BASOPHILS # BLD: 0.1 K/UL (ref 0–0.1)
BASOPHILS NFR BLD: 1 % (ref 0–1)
BILIRUB SERPL-MCNC: 0.6 MG/DL (ref 0.2–1)
BUN SERPL-MCNC: 9 MG/DL (ref 6–20)
BUN/CREAT SERPL: 12 (ref 12–20)
CA-I BLD-MCNC: 9.3 MG/DL (ref 8.5–10.1)
CHLORIDE SERPL-SCNC: 101 MMOL/L (ref 97–108)
CO2 SERPL-SCNC: 29 MMOL/L (ref 21–32)
CREAT SERPL-MCNC: 0.76 MG/DL (ref 0.7–1.3)
DIFFERENTIAL METHOD BLD: ABNORMAL
EOSINOPHIL # BLD: 0.6 K/UL (ref 0–0.4)
EOSINOPHIL NFR BLD: 6 % (ref 0–7)
ERYTHROCYTE [DISTWIDTH] IN BLOOD BY AUTOMATED COUNT: 12.6 % (ref 11.5–14.5)
GLOBULIN SER CALC-MCNC: 3.9 G/DL (ref 2–4)
GLUCOSE BLD STRIP.AUTO-MCNC: 134 MG/DL (ref 65–100)
GLUCOSE BLD STRIP.AUTO-MCNC: 138 MG/DL (ref 65–100)
GLUCOSE BLD STRIP.AUTO-MCNC: 146 MG/DL (ref 65–100)
GLUCOSE BLD STRIP.AUTO-MCNC: 167 MG/DL (ref 65–100)
GLUCOSE BLD STRIP.AUTO-MCNC: 181 MG/DL (ref 65–100)
GLUCOSE SERPL-MCNC: 151 MG/DL (ref 65–100)
HCT VFR BLD AUTO: 49.5 % (ref 36.6–50.3)
HGB BLD-MCNC: 16.6 G/DL (ref 12.1–17)
IMM GRANULOCYTES # BLD AUTO: 0 K/UL (ref 0–0.04)
IMM GRANULOCYTES NFR BLD AUTO: 0 % (ref 0–0.5)
LYMPHOCYTES # BLD: 4.4 K/UL (ref 0.8–3.5)
LYMPHOCYTES NFR BLD: 44 % (ref 12–49)
MCH RBC QN AUTO: 30.3 PG (ref 26–34)
MCHC RBC AUTO-ENTMCNC: 33.5 G/DL (ref 30–36.5)
MCV RBC AUTO: 90.5 FL (ref 80–99)
MONOCYTES # BLD: 0.8 K/UL (ref 0–1)
MONOCYTES NFR BLD: 8 % (ref 5–13)
NEUTS SEG # BLD: 4.1 K/UL (ref 1.8–8)
NEUTS SEG NFR BLD: 41 % (ref 32–75)
NRBC # BLD: 0 K/UL (ref 0–0.01)
NRBC BLD-RTO: 0 PER 100 WBC
PERFORMED BY:: ABNORMAL
PLATELET # BLD AUTO: 191 K/UL (ref 150–400)
PMV BLD AUTO: 10.6 FL (ref 8.9–12.9)
POTASSIUM SERPL-SCNC: 3.7 MMOL/L (ref 3.5–5.1)
PROT SERPL-MCNC: 7.4 G/DL (ref 6.4–8.2)
RBC # BLD AUTO: 5.47 M/UL (ref 4.1–5.7)
SODIUM SERPL-SCNC: 136 MMOL/L (ref 136–145)
THERAPEUTIC RANGE: ABNORMAL SEC (ref 82–109)
UFH PPP CHRO-ACNC: 0.98 IU/ML
UFH PPP CHRO-ACNC: <0.1 IU/ML
WBC # BLD AUTO: 10.1 K/UL (ref 4.1–11.1)

## 2023-11-17 PROCEDURE — 97530 THERAPEUTIC ACTIVITIES: CPT

## 2023-11-17 PROCEDURE — 97166 OT EVAL MOD COMPLEX 45 MIN: CPT

## 2023-11-17 PROCEDURE — 85025 COMPLETE CBC W/AUTO DIFF WBC: CPT

## 2023-11-17 PROCEDURE — 36415 COLL VENOUS BLD VENIPUNCTURE: CPT

## 2023-11-17 PROCEDURE — 97116 GAIT TRAINING THERAPY: CPT

## 2023-11-17 PROCEDURE — 97161 PT EVAL LOW COMPLEX 20 MIN: CPT

## 2023-11-17 PROCEDURE — 85520 HEPARIN ASSAY: CPT

## 2023-11-17 PROCEDURE — 6370000000 HC RX 637 (ALT 250 FOR IP): Performed by: PHYSICIAN ASSISTANT

## 2023-11-17 PROCEDURE — 1100000000 HC RM PRIVATE

## 2023-11-17 PROCEDURE — 85730 THROMBOPLASTIN TIME PARTIAL: CPT

## 2023-11-17 PROCEDURE — 6360000002 HC RX W HCPCS: Performed by: INTERNAL MEDICINE

## 2023-11-17 PROCEDURE — 82962 GLUCOSE BLOOD TEST: CPT

## 2023-11-17 PROCEDURE — 6370000000 HC RX 637 (ALT 250 FOR IP): Performed by: HOSPITALIST

## 2023-11-17 PROCEDURE — 80053 COMPREHEN METABOLIC PANEL: CPT

## 2023-11-17 PROCEDURE — 6370000000 HC RX 637 (ALT 250 FOR IP): Performed by: INTERNAL MEDICINE

## 2023-11-17 RX ORDER — GINSENG 100 MG
CAPSULE ORAL
Qty: 1 G | Refills: 0 | Status: SHIPPED | OUTPATIENT
Start: 2023-11-17 | End: 2023-11-27

## 2023-11-17 RX ADMIN — INSULIN GLARGINE 30 UNITS: 100 INJECTION, SOLUTION SUBCUTANEOUS at 21:26

## 2023-11-17 RX ADMIN — HEPARIN SODIUM AND DEXTROSE 15 UNITS/KG/HR: 10000; 5 INJECTION INTRAVENOUS at 01:55

## 2023-11-17 RX ADMIN — GABAPENTIN 400 MG: 100 CAPSULE ORAL at 21:26

## 2023-11-17 RX ADMIN — ATORVASTATIN CALCIUM 40 MG: 40 TABLET, FILM COATED ORAL at 11:32

## 2023-11-17 RX ADMIN — GUAIFENESIN 600 MG: 600 TABLET, EXTENDED RELEASE ORAL at 11:32

## 2023-11-17 RX ADMIN — PANTOPRAZOLE SODIUM 40 MG: 40 TABLET, DELAYED RELEASE ORAL at 05:51

## 2023-11-17 RX ADMIN — FUROSEMIDE 40 MG: 40 TABLET ORAL at 11:34

## 2023-11-17 RX ADMIN — PREDNISOLONE ACETATE 1 DROP: 10 SUSPENSION/ DROPS OPHTHALMIC at 11:35

## 2023-11-17 RX ADMIN — HEPARIN SODIUM 4080 UNITS: 1000 INJECTION INTRAVENOUS; SUBCUTANEOUS at 06:59

## 2023-11-17 RX ADMIN — HYDROCODONE BITARTRATE AND ACETAMINOPHEN 1 TABLET: 10; 325 TABLET ORAL at 02:39

## 2023-11-17 RX ADMIN — PREDNISOLONE ACETATE 1 DROP: 10 SUSPENSION/ DROPS OPHTHALMIC at 02:29

## 2023-11-17 RX ADMIN — GABAPENTIN 400 MG: 100 CAPSULE ORAL at 11:35

## 2023-11-17 RX ADMIN — BACITRACIN: 500 OINTMENT TOPICAL at 21:27

## 2023-11-17 RX ADMIN — LEVETIRACETAM 500 MG: 500 TABLET, FILM COATED ORAL at 11:32

## 2023-11-17 RX ADMIN — DULOXETINE HYDROCHLORIDE 30 MG: 30 CAPSULE, DELAYED RELEASE ORAL at 11:32

## 2023-11-17 RX ADMIN — GUAIFENESIN 600 MG: 600 TABLET, EXTENDED RELEASE ORAL at 21:26

## 2023-11-17 RX ADMIN — BACITRACIN: 500 OINTMENT TOPICAL at 11:33

## 2023-11-17 ASSESSMENT — PAIN DESCRIPTION - LOCATION: LOCATION: FOOT

## 2023-11-17 ASSESSMENT — PAIN SCALES - GENERAL
PAINLEVEL_OUTOF10: 7
PAINLEVEL_OUTOF10: 8

## 2023-11-17 NOTE — PROGRESS NOTES
Per pharmacy note, patient to transition to anti-xa from aptt monitoring for heparin gtt 72 hours after last eliquis administration, last given 11/14 0900. Order placed for next aptt to be drawn at 0500 11/17, transition to anti-xa monitoring to take place afterwards.

## 2023-11-17 NOTE — PROGRESS NOTES
Upon attempting to hang new heparin bag noted patient arm swollen around IV site, IV infiltrated. Heparin gtt stopped and IV line removed. Presently attempting to insert new IV line.

## 2023-11-17 NOTE — DISCHARGE SUMMARY
Discharge Summary    Name: Rubi Rosales  [id number]  YOB: 1950 (Age: 68 y.o.)   Date of Admission: 11/14/2023  Date of Discharge: 11/17/2023  Attending Physician: Jolly Bryant MD    Discharge Diagnosis:   Principal Problem (Resolved):    Syncope, unspecified syncope type  Active Problems:    Orthostatic hypotension    Pulmonary embolism ([address])       Consultations:  IP CONSULT TO HOSPITALIST  IP CONSULT TO PICC TEAM  IP CONSULT TO ONCOLOGY      Brief Admission History/Reason for Admission Per Jan Young, DO:   Rubi Rosales is a 68 y.o. male who is with a PMH of CAD, CHF, hx of CABG who presents s/p syncope and fall at home, on Eliquis. Patient arrived to ED as trauma bravo. STEMI alert also called d/t c/o chest pain, which was cleared after review of EKG. Patient reports chest pain with nausea and vomiting last night, leading him to the bathroom where he had a syncopal episode and hit his head on the toilet. Patient reports prior hx of syncope. Brief Hospital Course by Main Problems:   Patient was admitted to trauma service and hospital medicine was consulted for medical management. Initial labs revealed mildly elevated BNP of 150, negative troponin x2, and a CT of the head and Cspine revealed no acute fracture, 13 mm right thyroid nodule, an old right occipital infarct, and multiple old lacunar infarcts. ECHO with EF 55-60%. TSH normal. Orthostatic vital signs obtained and patient fit criteria for diagnosis of orthostatic hypotension, discussed findings and implications when it comes to falling with patient. Carotid duplex negative except for 40% luminal compromise of right proximal ICA. Trauma service cleared patient and transferred service to hospital medicine. D-dimer was found to be elevated, CTA chest revealed acute segmental left lower lobe PE. Heparin ggt started. Heme/onc consult placed for eliquis failure.  Hematology recommended powder  Commonly known as: GLYCOLAX            STOP taking these medications      carvedilol 3.125 MG tablet  Commonly known as: COREG     Eliquis 5 MG Tabs tablet  Generic drug: apixaban     lisinopril 10 MG tablet  Commonly known as: PRINIVIL;ZESTRIL     metFORMIN 1000 MG tablet  Commonly known as: GLUCOPHAGE     spironolactone 25 MG tablet  Commonly known as: ALDACTONE               Where to Get Your Medications        These medications were sent to [address], [address] [facility] [phone] - F [phone]  [address], [address]      Phone: [phone]   bacitracin 500 UNIT/GM ointment  rivaroxaban 15 MG Tabs tablet  rivaroxaban 20 MG Tabs tablet             DISPOSITION:    Home with Family: x      Home with HH/PT/OT/RN:    SNF/LTC:    LEONARDO:    OTHER:            Code status:   Recommended diet: cardiac diet  Recommended activity: activity as tolerated and no driving for today  Wound care:  Replace bandage over L eyebrow laceration as needed, follow up with PCP to remove sutures      Follow up with:   PCP : Ellie Fleming APRN - NP  [id number] [facility], SAGE Shepherd NP  [address] [address]  [phone]    Follow up in 1 week(s)  Follow up for recent hospital admission, need to discuss antihypertensive regimen and recheck blood pressure, discuss new diagnosis of orthostatic hypotension and how it relates to falls, and to recheck coagulation markers due to starting xarelto for pulmonary embolism, recheck of respiratory status.     [facility] EM[facility] [address] [facility]  [phone]  Follow up  As needed          Total time in minutes spent coordinating this discharge (includes going over instructions, follow-up, prescriptions, and preparing report for sign off to her PCP) :  35 minutes

## 2023-11-17 NOTE — PROGRESS NOTES
Discharge orders in for patient; still on heparin drip and no orders for oral blood thinner bridge. Met with Musa MORAN on the unit to advise. Heparin stopped and xelrelto ordered. Advised needs Aptt or antixa level first. Order placed. 1437 received critical PtT > 153. Both pharmacy and raheel Moran advised. Discharge held and repeat labs ordered for this evening. Held xelrelto as well. Caregiver Shana Lomeli Sanjeev updated on plan of care.

## 2023-11-18 VITALS
RESPIRATION RATE: 15 BRPM | OXYGEN SATURATION: 98 % | SYSTOLIC BLOOD PRESSURE: 132 MMHG | DIASTOLIC BLOOD PRESSURE: 77 MMHG | TEMPERATURE: 97.5 F | HEART RATE: 68 BPM | HEIGHT: 73 IN | WEIGHT: 225 LBS | BODY MASS INDEX: 29.82 KG/M2

## 2023-11-18 LAB
APTT PPP: 24.9 SEC (ref 21.2–34.1)
ERYTHROCYTE [DISTWIDTH] IN BLOOD BY AUTOMATED COUNT: 12.6 % (ref 11.5–14.5)
GLUCOSE BLD STRIP.AUTO-MCNC: 140 MG/DL (ref 65–100)
GLUCOSE BLD STRIP.AUTO-MCNC: 142 MG/DL (ref 65–100)
HCT VFR BLD AUTO: 46.5 % (ref 36.6–50.3)
HGB BLD-MCNC: 15.5 G/DL (ref 12.1–17)
MCH RBC QN AUTO: 30.3 PG (ref 26–34)
MCHC RBC AUTO-ENTMCNC: 33.3 G/DL (ref 30–36.5)
MCV RBC AUTO: 90.8 FL (ref 80–99)
NRBC # BLD: 0 K/UL (ref 0–0.01)
NRBC BLD-RTO: 0 PER 100 WBC
PERFORMED BY:: ABNORMAL
PERFORMED BY:: ABNORMAL
PLATELET # BLD AUTO: 272 K/UL (ref 150–400)
PMV BLD AUTO: 10.4 FL (ref 8.9–12.9)
RBC # BLD AUTO: 5.12 M/UL (ref 4.1–5.7)
THERAPEUTIC RANGE: NORMAL SEC (ref 82–109)
UFH PPP CHRO-ACNC: <0.1 IU/ML
WBC # BLD AUTO: 8.2 K/UL (ref 4.1–11.1)

## 2023-11-18 PROCEDURE — 85730 THROMBOPLASTIN TIME PARTIAL: CPT

## 2023-11-18 PROCEDURE — 85027 COMPLETE CBC AUTOMATED: CPT

## 2023-11-18 PROCEDURE — 36415 COLL VENOUS BLD VENIPUNCTURE: CPT

## 2023-11-18 PROCEDURE — 82962 GLUCOSE BLOOD TEST: CPT

## 2023-11-18 PROCEDURE — 6370000000 HC RX 637 (ALT 250 FOR IP): Performed by: PHYSICIAN ASSISTANT

## 2023-11-18 PROCEDURE — 85520 HEPARIN ASSAY: CPT

## 2023-11-18 PROCEDURE — 6370000000 HC RX 637 (ALT 250 FOR IP): Performed by: INTERNAL MEDICINE

## 2023-11-18 RX ADMIN — FUROSEMIDE 40 MG: 40 TABLET ORAL at 09:04

## 2023-11-18 RX ADMIN — PANTOPRAZOLE SODIUM 40 MG: 40 TABLET, DELAYED RELEASE ORAL at 05:55

## 2023-11-18 RX ADMIN — LEVETIRACETAM 500 MG: 500 TABLET, FILM COATED ORAL at 09:04

## 2023-11-18 RX ADMIN — RIVAROXABAN 15 MG: 15 TABLET, FILM COATED ORAL at 09:40

## 2023-11-18 RX ADMIN — ATORVASTATIN CALCIUM 40 MG: 40 TABLET, FILM COATED ORAL at 09:04

## 2023-11-18 RX ADMIN — GUAIFENESIN 600 MG: 600 TABLET, EXTENDED RELEASE ORAL at 09:04

## 2023-11-18 RX ADMIN — GABAPENTIN 400 MG: 100 CAPSULE ORAL at 09:04

## 2023-11-18 RX ADMIN — BACITRACIN: 500 OINTMENT TOPICAL at 16:17

## 2023-11-18 RX ADMIN — BACITRACIN: 500 OINTMENT TOPICAL at 09:04

## 2023-11-18 RX ADMIN — DULOXETINE HYDROCHLORIDE 30 MG: 30 CAPSULE, DELAYED RELEASE ORAL at 09:03

## 2023-11-18 ASSESSMENT — PAIN SCALES - GENERAL: PAINLEVEL_OUTOF10: 0

## 2023-11-18 NOTE — DISCHARGE SUMMARY
Discharge Summary    Name: Dave Jansen  [id number]  YOB: 1950 (Age: 68 y.o.)   Date of Admission: 11/14/2023  Date of Discharge: 11/18/2023  Attending Physician: Guillermina Cisneros MD    Discharge Diagnosis:   Principal Problem (Resolved):    Syncope, unspecified syncope type  Active Problems:    Orthostatic hypotension    Pulmonary embolism ([address])       Consultations:  IP CONSULT TO HOSPITALIST  IP CONSULT TO PICC TEAM  IP CONSULT TO ONCOLOGY      Brief Admission History/Reason for Admission Per Tristan Young, DO:   Dave Jansen is a 68 y.o. male who is with a PMH of CAD, CHF, hx of CABG who presents s/p syncope and fall at home, on Eliquis. Patient arrived to ED as trauma bravo. STEMI alert also called d/t c/o chest pain, which was cleared after review of EKG. Patient reports chest pain with nausea and vomiting last night, leading him to the bathroom where he had a syncopal episode and hit his head on the toilet. Patient reports prior hx of syncope. Brief Hospital Course by Main Problems:   Patient was admitted to trauma service and hospital medicine was consulted for medical management. Initial labs revealed mildly elevated BNP of 150, negative troponin x2, and a CT of the head and Cspine revealed no acute fracture, 13 mm right thyroid nodule, an old right occipital infarct, and multiple old lacunar infarcts. ECHO with EF 55-60%. TSH normal. Orthostatic vital signs obtained and patient fit criteria for diagnosis of orthostatic hypotension, discussed findings and implications when it comes to falling with patient. Carotid duplex negative except for 40% luminal compromise of right proximal ICA. Trauma service cleared patient and transferred service to hospital medicine. D-dimer was found to be elevated, CTA chest revealed acute segmental left lower lobe PE. Heparin ggt started. Heme/onc consult placed for eliquis failure.  Hematology recommended transition known as: GLYCOLAX            STOP taking these medications      carvedilol 3.125 MG tablet  Commonly known as: COREG     Eliquis 5 MG Tabs tablet  Generic drug: apixaban     lisinopril 10 MG tablet  Commonly known as: PRINIVIL;ZESTRIL     metFORMIN 1000 MG tablet  Commonly known as: GLUCOPHAGE     spironolactone 25 MG tablet  Commonly known as: ALDACTONE               Where to Get Your Medications        These medications were sent to [address], [address] [phone] - F [phone]  [address], [address] [facility] [address]      Phone: [phone]   bacitracin 500 UNIT/GM ointment  rivaroxaban 15 MG Tabs tablet  rivaroxaban 20 MG Tabs tablet             DISPOSITION:    Home with Family: x      Home with HH/PT/OT/RN:    SNF/LTC:    LEONARDO:    OTHER:            Code status:   Recommended diet: cardiac diet  Recommended activity: activity as tolerated and no driving for today  Wound care:  Replace bandage over L eyebrow laceration as needed, follow up with PCP to reassess wound healing      Follow up with:   PCP : None, None  SAGE Bond NP   "[facility]  [phone]    Go on 11/27/2023  1:00pm office appointment. Follow up for recent hospital admission, need to discuss antihypertensive regimen and recheck blood pressure, discuss new diagnosis of orthostatic hypotension and how it relates to falls, and to recheck coagulation markers due to starting xarelto for pulmonary embolism, recheck of respiratory status.     Also will need Thyrois US for 13 mm right thyroid nodule    [facility] EMERGENCY [address] [place]  [phone]  Follow up  As needed          Total time in minutes spent coordinating this discharge (includes going over instructions, follow-up, prescriptions, and preparing report for sign off to her PCP) :  35 minutes

## 2023-11-18 NOTE — PROGRESS NOTES
Rosa Garcia called letting her know pt will be discharged, message left    [phone]-Called Jose Alejandro Jolly letting him know pt will be discharged, message left.

## 2023-11-18 NOTE — PROGRESS NOTES
Discharge plan of care/case management plan validated with provider discharge order. Pt confirmed he will be able to enter the house when he gets there. Discharge instructions reviewed with pt. Pt verbalized understanding. Prescriptions reviewed. IV removed. Pt tolerated well. Pt escorted to door by staff and left via [employer] service.

## 2023-11-18 NOTE — CARE COORDINATION
Transition of Care Plan:    RUR:   Prior Level of Functioning:   Disposition: Home self care  Follow up appointments: PCP and all   DME needed: N/A  Transportation at discharge: TBD  IM/IMM Medicare/ letter given: N/A  Is patient a  and connected with [facility]? N/A  If yes, was [employer] transfer form completed and [facility] notified? N/A  Caregiver Contact: Staff has been trying to reach emergency contact and other individual's listed-all unsuccessful. Discharge Caregiver contacted prior to discharge? Staff still trying to reach to inform of discharge. Care Conference needed? N/A  Barriers to discharge:       ANDRE Corral

## 2023-11-23 PROBLEM — R55 SYNCOPE: Status: ACTIVE | Noted: 2019-01-02

## 2023-12-11 ENCOUNTER — HOSPITAL ENCOUNTER (EMERGENCY)
Facility: HOSPITAL | Age: 73
Discharge: HOME | End: 2023-12-11
Attending: STUDENT IN AN ORGANIZED HEALTH CARE EDUCATION/TRAINING PROGRAM
Payer: COMMERCIAL

## 2023-12-11 VITALS
BODY MASS INDEX: 29.69 KG/M2 | HEIGHT: 73 IN | DIASTOLIC BLOOD PRESSURE: 68 MMHG | HEART RATE: 70 BPM | TEMPERATURE: 98 F | OXYGEN SATURATION: 98 % | SYSTOLIC BLOOD PRESSURE: 112 MMHG | RESPIRATION RATE: 16 BRPM

## 2023-12-11 DIAGNOSIS — R55 NEAR SYNCOPE: Primary | ICD-10-CM

## 2023-12-11 DIAGNOSIS — F12.90 MARIJUANA USE: ICD-10-CM

## 2023-12-11 LAB
ALBUMIN SERPL-MCNC: 3 G/DL (ref 3.5–5)
ALBUMIN/GLOB SERPL: 0.8 (ref 1.1–2.2)
ALP SERPL-CCNC: 85 U/L (ref 45–117)
ALT SERPL-CCNC: 31 U/L (ref 12–78)
ANION GAP SERPL CALC-SCNC: 6 MMOL/L (ref 5–15)
AST SERPL W P-5'-P-CCNC: 19 U/L (ref 15–37)
BASOPHILS # BLD: 0.1 K/UL (ref 0–0.1)
BASOPHILS NFR BLD: 1 % (ref 0–1)
BILIRUB SERPL-MCNC: 0.3 MG/DL (ref 0.2–1)
BUN SERPL-MCNC: 11 MG/DL (ref 6–20)
BUN/CREAT SERPL: 13 (ref 12–20)
CA-I BLD-MCNC: 8.7 MG/DL (ref 8.5–10.1)
CHLORIDE SERPL-SCNC: 109 MMOL/L (ref 97–108)
CO2 SERPL-SCNC: 24 MMOL/L (ref 21–32)
CREAT SERPL-MCNC: 0.88 MG/DL (ref 0.7–1.3)
DIFFERENTIAL METHOD BLD: ABNORMAL
EOSINOPHIL # BLD: 0.4 K/UL (ref 0–0.4)
EOSINOPHIL NFR BLD: 6 % (ref 0–7)
ERYTHROCYTE [DISTWIDTH] IN BLOOD BY AUTOMATED COUNT: 13.3 % (ref 11.5–14.5)
GLOBULIN SER CALC-MCNC: 3.7 G/DL (ref 2–4)
GLUCOSE SERPL-MCNC: 126 MG/DL (ref 65–100)
HCT VFR BLD AUTO: 40.3 % (ref 36.6–50.3)
HGB BLD-MCNC: 13.3 G/DL (ref 12.1–17)
IMM GRANULOCYTES # BLD AUTO: 0 K/UL (ref 0–0.04)
IMM GRANULOCYTES NFR BLD AUTO: 0 % (ref 0–0.5)
LYMPHOCYTES # BLD: 3.8 K/UL (ref 0.8–3.5)
LYMPHOCYTES NFR BLD: 54 % (ref 12–49)
MAGNESIUM SERPL-MCNC: 1.9 MG/DL (ref 1.6–2.4)
MCH RBC QN AUTO: 30.5 PG (ref 26–34)
MCHC RBC AUTO-ENTMCNC: 33 G/DL (ref 30–36.5)
MCV RBC AUTO: 92.4 FL (ref 80–99)
MONOCYTES # BLD: 0.5 K/UL (ref 0–1)
MONOCYTES NFR BLD: 6 % (ref 5–13)
NEUTS SEG # BLD: 2.4 K/UL (ref 1.8–8)
NEUTS SEG NFR BLD: 33 % (ref 32–75)
NRBC # BLD: 0 K/UL (ref 0–0.01)
NRBC BLD-RTO: 0 PER 100 WBC
PLATELET # BLD AUTO: 153 K/UL (ref 150–400)
PMV BLD AUTO: 10.9 FL (ref 8.9–12.9)
POTASSIUM SERPL-SCNC: 4.4 MMOL/L (ref 3.5–5.1)
PROT SERPL-MCNC: 6.7 G/DL (ref 6.4–8.2)
RBC # BLD AUTO: 4.36 M/UL (ref 4.1–5.7)
SODIUM SERPL-SCNC: 139 MMOL/L (ref 136–145)
TROPONIN I SERPL HS-MCNC: 9 NG/L (ref 0–76)
WBC # BLD AUTO: 7.2 K/UL (ref 4.1–11.1)

## 2023-12-11 PROCEDURE — 83735 ASSAY OF MAGNESIUM: CPT

## 2023-12-11 PROCEDURE — 85025 COMPLETE CBC W/AUTO DIFF WBC: CPT

## 2023-12-11 PROCEDURE — 93005 ELECTROCARDIOGRAM TRACING: CPT | Performed by: STUDENT IN AN ORGANIZED HEALTH CARE EDUCATION/TRAINING PROGRAM

## 2023-12-11 PROCEDURE — 84484 ASSAY OF TROPONIN QUANT: CPT

## 2023-12-11 PROCEDURE — 80053 COMPREHEN METABOLIC PANEL: CPT

## 2023-12-11 PROCEDURE — 99284 EMERGENCY DEPT VISIT MOD MDM: CPT

## 2023-12-11 ASSESSMENT — PAIN - FUNCTIONAL ASSESSMENT: PAIN_FUNCTIONAL_ASSESSMENT: 0-10

## 2023-12-11 ASSESSMENT — PAIN DESCRIPTION - LOCATION: LOCATION: FOOT

## 2023-12-11 ASSESSMENT — PAIN DESCRIPTION - ORIENTATION: ORIENTATION: RIGHT;LEFT

## 2023-12-12 LAB
EKG ATRIAL RATE: 64 BPM
EKG DIAGNOSIS: NORMAL
EKG P AXIS: 20 DEGREES
EKG P-R INTERVAL: 168 MS
EKG Q-T INTERVAL: 446 MS
EKG QRS DURATION: 126 MS
EKG QTC CALCULATION (BAZETT): 460 MS
EKG R AXIS: -73 DEGREES
EKG T AXIS: 25 DEGREES
EKG VENTRICULAR RATE: 64 BPM

## 2023-12-12 NOTE — ED PROVIDER NOTES
[facility] EMERGENCY DEPT  EMERGENCY DEPARTMENT HISTORY AND PHYSICAL EXAM      Date: 12/11/2023  Patient Name: Urbano Handley  MRN: [id number]  [address] [facility] [place] 1950  Date of evaluation: 12/11/2023  Provider: Jenelle Grewal MD   Note Started: 9:42 PM EST 12/11/23    HISTORY OF PRESENT ILLNESS     Chief Complaint   Patient presents with    Seizures       History Provided By: Patient    HPI: Urbano Handley is a 68 y.o. male with PMH of DM, HTN, seizure disorder, and CAD who comes to the ED with chief complaint of seizure. Reported further clarification from the patient, patient admits that he smokes significant amount of marijuana and thought he may have had passed out. Denies any seizure-like activity. Shortly after patient had a episode of vomiting. Prearrival fingerstick is within normal.  Patient denies any chest pain, shortness of breath, lightheadedness or dizziness. Denies any headache, change in vision, hearing, speech, numbness. Reports otherwise that he is at his baseline and does not have any other symptoms or concerns today. PAST MEDICAL HISTORY   Past Medical History:  Past Medical History:   Diagnosis Date    CAD (coronary artery disease)     Diabetes ([address])     DVT (deep venous thrombosis) (HCC)     GERD (gastroesophageal reflux disease)     Heart failure (HCC)     Hx of blood clots     Hypertension     Pulmonary embolism (HCC)     Seizures ([address])        Past Surgical History:  Past Surgical History:   Procedure Laterality Date    CORONARY ARTERY BYPASS GRAFT      4 way       Family History:  History reviewed. No pertinent family history. Social History:  Social History     Tobacco Use    Smoking status: Every Day     Packs/day: .5     Types: Cigarettes    Smokeless tobacco: Never   Substance Use Topics    Alcohol use: Yes    Drug use: Not Currently       Allergies:  No Known Allergies    PCP: None, None    Current Meds:   No current facility-administered medications for this encounter.      Current

## 2023-12-12 NOTE — ED TRIAGE NOTES
Patient arrives by EMS after possible seizure tonight. Witness says it lasted about 30 seconds. Vomiting x 1 prior to EMS arrival and A/O when EMS arrived. Admits to smoking quite a bit of marijuana today. Hx of seizures and diabetes.   Blood glucose 135 with EMS

## 2025-02-24 NOTE — PROGRESS NOTES
Loren Adkins is a 72 y.o. male who was seen by synchronous (real-time) audio-video technology on 11/13/2020 for Hypertension and Depression    Has not had any labs done   needs refills on all meds   on quaranteen in home w a covid patient right now  The person tested pos 2 days ago  Mr Neymar Ramirez is neg at this time  No symptoms    Assessment & Plan:   Diagnoses and all orders for this visit:    1. Type 2 diabetes mellitus treated with insulin (HCC)  -     HEMOGLOBIN A1C WITH EAG; Future  -     insulin regular (NOVOLIN R, HUMULIN R) 100 unit/mL injection; 10 Units by SubCUTAneous route two (2) times a day. -     insulin glargine (Lantus Solostar U-100 Insulin) 100 unit/mL (3 mL) inpn; 30 Units by SubCUTAneous route two (2) times a day. -     metFORMIN (GLUCOPHAGE) 1,000 mg tablet; Take 1 Tab by mouth two (2) times daily (with meals). 2. Coronary artery disease without angina pectoris, unspecified vessel or lesion type, unspecified whether native or transplanted heart  -     LIPID PANEL; Future  -     isosorbide-hydrALAZINE (BiDiL) 20-37.5 mg per tablet; Take 1 Tab by mouth three (3) times daily. -     atorvastatin (LIPITOR) 40 mg tablet; Take 1 Tab by mouth daily. -     carvediloL (COREG) 25 mg tablet; Take 1 Tab by mouth two (2) times daily (with meals). -     aspirin 81 mg chewable tablet; Take 1 Tab by mouth daily. 3. Essential hypertension  -     METABOLIC PANEL, COMPREHENSIVE; Future  -     hydroCHLOROthiazide (HYDRODIURIL) 25 mg tablet; Take 1 Tab by mouth daily. -     furosemide (LASIX) 40 mg tablet; Take 1 Tab by mouth daily. 4. Gastroesophageal reflux disease without esophagitis  -     omeprazole (PRILOSEC) 40 mg capsule; Take 1 Cap by mouth daily. 5. Coronary artery disease with angina pectoris, unspecified vessel or lesion type, unspecified whether native or transplanted heart ([facility] [address]    6. Screening for prostate cancer  -     PSA SCREENING (SCREENING);  Future    Other orders  -     Insulin Syringe-Needle U-100 0.5 mL 30 gauge x 5/16\" syrg; Use as directed BID  -     tamsulosin (FLOMAX) 0.4 mg capsule; Take 1 Cap by mouth daily. -     amitriptyline (ELAVIL) 25 mg tablet; Take 1 Tab by mouth nightly. I am refilling for 30 days   come in at the end of the month period to get blood tested  I will extend more refills after getting the labs done  Subjective:       Prior to Admission medications    Medication Sig Start Date End Date Taking? Authorizing Provider   Insulin Syringe-Needle U-100 0.5 mL 30 gauge x 5/16\" syrg Use as directed BID 11/13/20  Yes Brandon Ramesh MD   insulin regular (NOVOLIN R, HUMULIN R) 100 unit/mL injection 10 Units by SubCUTAneous route two (2) times a day. 11/13/20  Yes Brandon Ramesh MD   insulin glargine (Lantus Solostar U-100 Insulin) 100 unit/mL (3 mL) inpn 30 Units by SubCUTAneous route two (2) times a day. 11/13/20  Yes Brandon Ramesh MD   isosorbide-hydrALAZINE (BiDiL) 20-37.5 mg per tablet Take 1 Tab by mouth three (3) times daily. 11/13/20  Yes Brandon Ramesh MD   atorvastatin (LIPITOR) 40 mg tablet Take 1 Tab by mouth daily. 11/13/20  Yes Brandon Ramesh MD   carvediloL (COREG) 25 mg tablet Take 1 Tab by mouth two (2) times daily (with meals). 11/13/20  Yes Brandon Ramesh MD   hydroCHLOROthiazide (HYDRODIURIL) 25 mg tablet Take 1 Tab by mouth daily. 11/13/20  Yes Brandon Ramesh MD   metFORMIN (GLUCOPHAGE) 1,000 mg tablet Take 1 Tab by mouth two (2) times daily (with meals). 11/13/20  Yes Brandon Ramesh MD   omeprazole (PRILOSEC) 40 mg capsule Take 1 Cap by mouth daily. 11/13/20  Yes Brandon Ramesh MD   tamsulosin [facility]) 0.4 mg capsule Take 1 Cap by mouth daily. 11/13/20  Yes Brandon Ramesh MD   furosemide (LASIX) 40 mg tablet Take 1 Tab by mouth daily. 11/13/20  Yes Brandon Ramesh MD   aspirin 81 mg chewable tablet Take 1 Tab by mouth daily. 11/13/20  Yes Brandon Ramesh MD   amitriptyline (ELAVIL) 25 mg tablet Take 1 Tab by mouth nightly.  11/13/20  Yes Fernando Sexton, Landry Haque MD   ergocalciferol (ERGOCALCIFEROL) 1,250 mcg (50,000 unit) capsule TK 1 C PO 1 TIME A WK 5/13/20  Yes Provider, Historical   albuterol (PROVENTIL HFA, VENTOLIN HFA, PROAIR HFA) 90 mcg/actuation inhaler Take 2 Puffs by inhalation every four (4) hours as needed for Wheezing for up to 360 days. 11/16/20 11/11/21  Anisha Calhoun NP   Insulin Needles, Disposable, (Comfort EZ Pen Needles) 32 gauge x 5/32\" ndle E11.6 use for insulin SQ injection as directed 11/14/20   Arie Sanches NP     Patient Active Problem List    Diagnosis Date Noted    Coronary artery disease 01/02/2019    Syncope 01/02/2019    Dyspnea on exertion 01/02/2019     Current Outpatient Medications   Medication Sig Dispense Refill    Insulin Syringe-Needle U-100 0.5 mL 30 gauge x 5/16\" syrg Use as directed  Syringe 3    insulin regular (NOVOLIN R, HUMULIN R) 100 unit/mL injection 10 Units by SubCUTAneous route two (2) times a day. 3 Vial 2    insulin glargine (Lantus Solostar U-100 Insulin) 100 unit/mL (3 mL) inpn 30 Units by SubCUTAneous route two (2) times a day. 6 Pen 1    isosorbide-hydrALAZINE (BiDiL) 20-37.5 mg per tablet Take 1 Tab by mouth three (3) times daily. 90 Tab 0    atorvastatin (LIPITOR) 40 mg tablet Take 1 Tab by mouth daily. 30 Tab 0    carvediloL (COREG) 25 mg tablet Take 1 Tab by mouth two (2) times daily (with meals). 60 Tab 0    hydroCHLOROthiazide (HYDRODIURIL) 25 mg tablet Take 1 Tab by mouth daily. 30 Tab 0    metFORMIN (GLUCOPHAGE) 1,000 mg tablet Take 1 Tab by mouth two (2) times daily (with meals). 60 Tab 0    omeprazole (PRILOSEC) 40 mg capsule Take 1 Cap by mouth daily. 30 Cap 0    tamsulosin (FLOMAX) 0.4 mg capsule Take 1 Cap by mouth daily. 30 Cap 0    furosemide (LASIX) 40 mg tablet Take 1 Tab by mouth daily. 30 Tab 0    aspirin 81 mg chewable tablet Take 1 Tab by mouth daily. 90 Tab 1    amitriptyline (ELAVIL) 25 mg tablet Take 1 Tab by mouth nightly.  30 Tab 0    ergocalciferol (ERGOCALCIFEROL) 1,250 mcg (50,000 unit) capsule TK 1 C PO 1 TIME A WK      albuterol (PROVENTIL HFA, VENTOLIN HFA, PROAIR HFA) 90 mcg/actuation inhaler Take 2 Puffs by inhalation every four (4) hours as needed for Wheezing for up to 360 days.  1 Inhaler 2    Insulin Needles, Disposable, (Comfort EZ Pen Needles) 32 gauge x 5/32\" ndle E11.6 use for insulin SQ injection as directed 100 Pen Needle 2       ROS    Objective:     Patient-Reported Vitals 11/13/2020   Patient-Reported Weight 260lb        [INSTRUCTIONS:  \"[x]\" Indicates a positive item  \"[]\" Indicates a negative item  -- DELETE ALL ITEMS NOT EXAMINED]    Constitutional: [x] Appears well-developed and well-nourished [x] No apparent distress      [] Abnormal -     Mental status: [x] Alert and awake  [x] Oriented to person/place/time [x] Able to follow commands    [] Abnormal -     Eyes:   EOM    [x]  Normal    [] Abnormal -   Sclera  [x]  Normal    [] Abnormal -          Discharge [x]  None visible   [] Abnormal -     HENT: [x] Normocephalic, atraumatic  [] Abnormal -   [x] Mouth/Throat: Mucous membranes are moist    External Ears [x] Normal  [] Abnormal -    Neck: [x] No visualized mass [] Abnormal -     Pulmonary/Chest: [x] Respiratory effort normal   [x] No visualized signs of difficulty breathing or respiratory distress        [] Abnormal -      Musculoskeletal:   [x] Normal gait with no signs of ataxia         [x] Normal range of motion of neck        [] Abnormal -     Neurological:        [x] No Facial Asymmetry (Cranial nerve 7 motor function) (limited exam due to video visit)          [x] No gaze palsy        [] Abnormal -          Skin:        [x] No significant exanthematous lesions or discoloration noted on facial skin         [] Abnormal -            Psychiatric:       [x] Normal Affect [] Abnormal -        [x] No Hallucinations    Other pertinent observable physical exam findings:-        We discussed the expected course, resolution and complications of the diagnosis(es) in detail. Medication risks, benefits, costs, interactions, and alternatives were discussed as indicated. I advised him to contact the office if his condition worsens, changes or fails to improve as anticipated. He expressed understanding with the diagnosis(es) and plan. Beryl Howell, who was evaluated through a patient-initiated, synchronous (real-time) audio-video encounter, and/or his healthcare decision maker, is aware that it is a billable service, with coverage as determined by his insurance carrier. He provided verbal consent to proceed: Yes, and patient identification was verified. It was conducted pursuant to the emergency declaration under the [facility], [address] [place] [address] authority and the [employer] Act. A caregiver was present when appropriate. Ability to conduct physical exam was limited. I was at home. The patient was at home.       Nivia Saldaña MD Mom cuts food into small pieces and thickens liquids

## 2025-04-05 ENCOUNTER — HOSPITAL ENCOUNTER (EMERGENCY)
Facility: HOSPITAL | Age: 75
Discharge: HOME | End: 2025-04-05
Attending: FAMILY MEDICINE
Payer: MEDICAID

## 2025-04-05 ENCOUNTER — APPOINTMENT (OUTPATIENT)
Facility: HOSPITAL | Age: 75
End: 2025-04-05
Payer: MEDICAID

## 2025-04-05 VITALS
OXYGEN SATURATION: 95 % | SYSTOLIC BLOOD PRESSURE: 126 MMHG | HEIGHT: 74 IN | WEIGHT: 209 LBS | RESPIRATION RATE: 10 BRPM | TEMPERATURE: 98 F | BODY MASS INDEX: 26.82 KG/M2 | DIASTOLIC BLOOD PRESSURE: 56 MMHG | HEART RATE: 59 BPM

## 2025-04-05 DIAGNOSIS — R11.2 NAUSEA AND VOMITING, UNSPECIFIED VOMITING TYPE: Primary | ICD-10-CM

## 2025-04-05 DIAGNOSIS — K59.00 CONSTIPATION, UNSPECIFIED CONSTIPATION TYPE: ICD-10-CM

## 2025-04-05 LAB
ALBUMIN SERPL-MCNC: 2.8 G/DL (ref 3.5–5)
ALBUMIN/GLOB SERPL: 0.8 (ref 1.1–2.2)
ALP SERPL-CCNC: 93 U/L (ref 45–117)
ALT SERPL-CCNC: 14 U/L (ref 12–78)
ANION GAP SERPL CALC-SCNC: 9 MMOL/L (ref 2–12)
AST SERPL W P-5'-P-CCNC: 15 U/L (ref 15–37)
BASOPHILS # BLD: 0.04 K/UL (ref 0–0.1)
BASOPHILS NFR BLD: 0.6 % (ref 0–1)
BILIRUB SERPL-MCNC: 0.5 MG/DL (ref 0.2–1)
BUN SERPL-MCNC: 8 MG/DL (ref 6–20)
BUN/CREAT SERPL: 9 (ref 12–20)
CA-I BLD-MCNC: 8.7 MG/DL (ref 8.5–10.1)
CHLORIDE SERPL-SCNC: 107 MMOL/L (ref 97–108)
CO2 SERPL-SCNC: 28 MMOL/L (ref 21–32)
CREAT SERPL-MCNC: 0.92 MG/DL (ref 0.7–1.3)
DIFFERENTIAL METHOD BLD: ABNORMAL
EOSINOPHIL # BLD: 0.43 K/UL (ref 0–0.4)
EOSINOPHIL NFR BLD: 7 % (ref 0–7)
ERYTHROCYTE [DISTWIDTH] IN BLOOD BY AUTOMATED COUNT: 12.9 % (ref 11.5–14.5)
GLOBULIN SER CALC-MCNC: 3.5 G/DL (ref 2–4)
GLUCOSE SERPL-MCNC: 124 MG/DL (ref 65–100)
HCT VFR BLD AUTO: 39 % (ref 36.6–50.3)
HGB BLD-MCNC: 12.7 G/DL (ref 12.1–17)
IMM GRANULOCYTES # BLD AUTO: 0.01 K/UL (ref 0–0.04)
IMM GRANULOCYTES NFR BLD AUTO: 0.2 % (ref 0–0.5)
LIPASE SERPL-CCNC: 12 U/L (ref 13–75)
LYMPHOCYTES # BLD: 3.01 K/UL (ref 0.8–3.5)
LYMPHOCYTES NFR BLD: 48.8 % (ref 12–49)
MCH RBC QN AUTO: 30.4 PG (ref 26–34)
MCHC RBC AUTO-ENTMCNC: 32.6 G/DL (ref 30–36.5)
MCV RBC AUTO: 93.3 FL (ref 80–99)
MONOCYTES # BLD: 0.42 K/UL (ref 0–1)
MONOCYTES NFR BLD: 6.8 % (ref 5–13)
NEUTS SEG # BLD: 2.26 K/UL (ref 1.8–8)
NEUTS SEG NFR BLD: 36.6 % (ref 32–75)
PLATELET # BLD AUTO: 186 K/UL (ref 150–400)
PMV BLD AUTO: 10.5 FL (ref 8.9–12.9)
POTASSIUM SERPL-SCNC: 3.6 MMOL/L (ref 3.5–5.1)
PROT SERPL-MCNC: 6.3 G/DL (ref 6.4–8.2)
RBC # BLD AUTO: 4.18 M/UL (ref 4.1–5.7)
SODIUM SERPL-SCNC: 144 MMOL/L (ref 136–145)
TROPONIN I SERPL HS-MCNC: 12 NG/L (ref 0–76)
WBC # BLD AUTO: 6.2 K/UL (ref 4.1–11.1)

## 2025-04-05 PROCEDURE — 80053 COMPREHEN METABOLIC PANEL: CPT

## 2025-04-05 PROCEDURE — 6360000002 HC RX W HCPCS: Performed by: FAMILY MEDICINE

## 2025-04-05 PROCEDURE — 96374 THER/PROPH/DIAG INJ IV PUSH: CPT

## 2025-04-05 PROCEDURE — 74176 CT ABD & PELVIS W/O CONTRAST: CPT

## 2025-04-05 PROCEDURE — 99284 EMERGENCY DEPT VISIT MOD MDM: CPT

## 2025-04-05 PROCEDURE — 36415 COLL VENOUS BLD VENIPUNCTURE: CPT

## 2025-04-05 PROCEDURE — 85025 COMPLETE CBC W/AUTO DIFF WBC: CPT

## 2025-04-05 PROCEDURE — 93005 ELECTROCARDIOGRAM TRACING: CPT | Performed by: FAMILY MEDICINE

## 2025-04-05 PROCEDURE — 83690 ASSAY OF LIPASE: CPT

## 2025-04-05 PROCEDURE — 70450 CT HEAD/BRAIN W/O DYE: CPT

## 2025-04-05 PROCEDURE — 84484 ASSAY OF TROPONIN QUANT: CPT

## 2025-04-05 PROCEDURE — 2580000003 HC RX 258: Performed by: FAMILY MEDICINE

## 2025-04-05 RX ORDER — DOCUSATE SODIUM 100 MG/1
100 CAPSULE, LIQUID FILLED ORAL 2 TIMES DAILY
Qty: 30 CAPSULE | Refills: 0 | Status: SHIPPED | OUTPATIENT
Start: 2025-04-05 | End: 2025-05-05

## 2025-04-05 RX ORDER — ONDANSETRON 2 MG/ML
4 INJECTION INTRAMUSCULAR; INTRAVENOUS
Status: COMPLETED | OUTPATIENT
Start: 2025-04-05 | End: 2025-04-05

## 2025-04-05 RX ORDER — POLYETHYLENE GLYCOL 3350 17 G/17G
17 POWDER ORAL DAILY
Qty: 225 G | Refills: 0 | Status: SHIPPED | OUTPATIENT
Start: 2025-04-05 | End: 2025-05-05

## 2025-04-05 RX ORDER — 0.9 % SODIUM CHLORIDE 0.9 %
1000 INTRAVENOUS SOLUTION INTRAVENOUS
Status: COMPLETED | OUTPATIENT
Start: 2025-04-05 | End: 2025-04-05

## 2025-04-05 RX ORDER — ONDANSETRON 4 MG/1
4 TABLET, ORALLY DISINTEGRATING ORAL 3 TIMES DAILY PRN
Qty: 12 TABLET | Refills: 0 | Status: SHIPPED | OUTPATIENT
Start: 2025-04-05

## 2025-04-05 RX ADMIN — SODIUM CHLORIDE 1000 ML: 0.9 INJECTION, SOLUTION INTRAVENOUS at 18:46

## 2025-04-05 RX ADMIN — ONDANSETRON 4 MG: 2 INJECTION INTRAMUSCULAR; INTRAVENOUS at 18:45

## 2025-04-05 ASSESSMENT — PAIN DESCRIPTION - ORIENTATION: ORIENTATION: MID

## 2025-04-05 ASSESSMENT — PAIN DESCRIPTION - LOCATION: LOCATION: CHEST

## 2025-04-05 ASSESSMENT — PAIN SCALES - GENERAL: PAINLEVEL_OUTOF10: 0

## 2025-04-05 ASSESSMENT — PAIN - FUNCTIONAL ASSESSMENT: PAIN_FUNCTIONAL_ASSESSMENT: 0-10

## 2025-04-05 NOTE — ED PROVIDER NOTES
Musculoskeletal:         General: No swelling or deformity.      Cervical back: Normal range of motion. No rigidity.   Skin:     Findings: No lesion or rash.   Neurological:      General: No focal deficit present.      Mental Status: He is alert. Mental status is at baseline.      Sensory: No sensory deficit.      Comments: GCS 15, no focal deficits on examination   Psychiatric:         Mood and Affect: Mood normal.         Behavior: Behavior normal.         Lab and Diagnostic Study Results     Labs -   No results found for this or any previous visit (from the past 12 hours).    Radiology:  Interpretation per the Radiologist below, if available at the time of this note:  CT ABDOMEN PELVIS WO CONTRAST Additional Contrast? None    (Results Pending)   CT HEAD WO CONTRAST    (Results Pending)          Medical Decision Making   - I am the primary provider for this patient Bhavesh Solorzano DO  - I reviewed the vital signs, available nursing notes, past medical history, past surgical history, family history and social history.  - Initial assessment performed. The patients presenting problems have been discussed, and they are in agreement with the care plan formulated and outlined with them.  I have encouraged them to ask questions as they arise throughout their visit.    Vital Signs-Reviewed the patient's vital signs.  Vitals:    04/05/25 1800   BP: 111/62   Pulse: 59   Resp: 18   Temp: 98 °F (36.7 °C)   TempSrc: Oral   SpO2: 95%   Weight: 94.8 kg (209 lb)   Height: 1.88 m (6' 2\")       CONSULTS: (Who and What was discussed)  None      Chronic Conditions: Reviewed per above     Social Determinants affecting Dx or Tx: None     Records Reviewed (source and summary of external notes): Nursing notes    ED Course as of 04/06/25 1043   Sat Apr 05, 2025 2005 Alert nontoxic-appearing.  Neuro nonfocal.  No respiratory distress.  Tolerating p.o. without vomiting.  CT and lab result reviewed by me [SK]   2007 The group home was      acetaminophen 325 MG tablet  Commonly known as: TYLENOL     atorvastatin 40 MG tablet  Commonly known as: LIPITOR     DULoxetine 30 MG extended release capsule  Commonly known as: CYMBALTA     furosemide 40 MG tablet  Commonly known as: LASIX     gabapentin 400 MG capsule  Commonly known as: NEURONTIN     Lantus SoloStar 100 UNIT/ML injection pen  Generic drug: insulin glargine     levETIRAcetam 500 MG tablet  Commonly known as: KEPPRA     methocarbamol 500 MG tablet  Commonly known as: ROBAXIN     nicotine 21 MG/24HR  Commonly known as: NICODERM CQ     omeprazole 40 MG delayed release capsule  Commonly known as: PRILOSEC     polyethylene glycol 17 GM/SCOOP powder  Commonly known as: GLYCOLAX     * rivaroxaban 15 MG Tabs tablet  Commonly known as: Xarelto  Take 1 tablet by mouth 2 times daily (with meals) for 21 days     * rivaroxaban 20 MG Tabs tablet  Commonly known as: Xarelto  Take 1 tablet by mouth daily (with breakfast)           * This list has 2 medication(s) that are the same as other medications prescribed for you. Read the directions carefully, and ask your doctor or other care provider to review them with you.                  4. PATIENT REFERRED TO:  Please make a follow-up appointment with your PCP as soon as possible    Schedule an appointment as soon as possible for a visit            Diagnosis     Clinical Impression:    1. Nausea and vomiting, unspecified vomiting type        Attestations:    Bhavesh Solorzano DO    Please note that this dictation was completed with [employer], the computer voice recognition software.  Quite often unanticipated grammatical, syntax, homophones, and other interpretive errors are inadvertently transcribed by the computer software.  Please disregard these errors.  Please excuse any errors that have escaped final proofreading.  Thank you.         Bhavesh Solorzano DO  04/06/25 6735

## 2025-04-07 LAB
EKG ATRIAL RATE: 60 BPM
EKG DIAGNOSIS: NORMAL
EKG P AXIS: 34 DEGREES
EKG P-R INTERVAL: 160 MS
EKG Q-T INTERVAL: 448 MS
EKG QRS DURATION: 134 MS
EKG QTC CALCULATION (BAZETT): 448 MS
EKG R AXIS: -73 DEGREES
EKG T AXIS: 20 DEGREES
EKG VENTRICULAR RATE: 60 BPM